# Patient Record
Sex: FEMALE | Race: WHITE | NOT HISPANIC OR LATINO | URBAN - METROPOLITAN AREA
[De-identification: names, ages, dates, MRNs, and addresses within clinical notes are randomized per-mention and may not be internally consistent; named-entity substitution may affect disease eponyms.]

---

## 2022-06-13 PROBLEM — Z00.00 ENCOUNTER FOR PREVENTIVE HEALTH EXAMINATION: Status: ACTIVE | Noted: 2022-06-13

## 2022-06-21 ENCOUNTER — OUTPATIENT (OUTPATIENT)
Dept: OUTPATIENT SERVICES | Facility: HOSPITAL | Age: 87
LOS: 1 days | Discharge: HOME | End: 2022-06-21
Payer: MEDICARE

## 2022-06-21 VITALS
TEMPERATURE: 99 F | RESPIRATION RATE: 18 BRPM | OXYGEN SATURATION: 93 % | SYSTOLIC BLOOD PRESSURE: 143 MMHG | DIASTOLIC BLOOD PRESSURE: 54 MMHG | WEIGHT: 189.6 LBS | HEIGHT: 61 IN | HEART RATE: 55 BPM

## 2022-06-21 DIAGNOSIS — Z98.891 HISTORY OF UTERINE SCAR FROM PREVIOUS SURGERY: Chronic | ICD-10-CM

## 2022-06-21 DIAGNOSIS — I10 ESSENTIAL (PRIMARY) HYPERTENSION: ICD-10-CM

## 2022-06-21 DIAGNOSIS — G47.30 SLEEP APNEA, UNSPECIFIED: ICD-10-CM

## 2022-06-21 DIAGNOSIS — Z98.890 OTHER SPECIFIED POSTPROCEDURAL STATES: Chronic | ICD-10-CM

## 2022-06-21 DIAGNOSIS — M17.11 UNILATERAL PRIMARY OSTEOARTHRITIS, RIGHT KNEE: ICD-10-CM

## 2022-06-21 DIAGNOSIS — E07.9 DISORDER OF THYROID, UNSPECIFIED: ICD-10-CM

## 2022-06-21 DIAGNOSIS — Z90.710 ACQUIRED ABSENCE OF BOTH CERVIX AND UTERUS: Chronic | ICD-10-CM

## 2022-06-21 DIAGNOSIS — Z87.19 PERSONAL HISTORY OF OTHER DISEASES OF THE DIGESTIVE SYSTEM: Chronic | ICD-10-CM

## 2022-06-21 DIAGNOSIS — Z01.818 ENCOUNTER FOR OTHER PREPROCEDURAL EXAMINATION: ICD-10-CM

## 2022-06-21 LAB
A1C WITH ESTIMATED AVERAGE GLUCOSE RESULT: 6.2 % — HIGH (ref 4–5.6)
ALBUMIN SERPL ELPH-MCNC: 3.3 G/DL — SIGNIFICANT CHANGE UP (ref 3.3–5)
ALP SERPL-CCNC: 60 U/L — SIGNIFICANT CHANGE UP (ref 40–120)
ALT FLD-CCNC: 21 U/L — SIGNIFICANT CHANGE UP (ref 12–78)
ANION GAP SERPL CALC-SCNC: 7 MMOL/L — SIGNIFICANT CHANGE UP (ref 5–17)
APTT BLD: 26.5 SEC — LOW (ref 27.5–35.5)
AST SERPL-CCNC: 22 U/L — SIGNIFICANT CHANGE UP (ref 15–37)
BASOPHILS # BLD AUTO: 0.05 K/UL — SIGNIFICANT CHANGE UP (ref 0–0.2)
BASOPHILS NFR BLD AUTO: 0.6 % — SIGNIFICANT CHANGE UP (ref 0–2)
BILIRUB SERPL-MCNC: 0.3 MG/DL — SIGNIFICANT CHANGE UP (ref 0.2–1.2)
BLD GP AB SCN SERPL QL: SIGNIFICANT CHANGE UP
BUN SERPL-MCNC: 31 MG/DL — HIGH (ref 7–23)
CALCIUM SERPL-MCNC: 9.3 MG/DL — SIGNIFICANT CHANGE UP (ref 8.5–10.1)
CHLORIDE SERPL-SCNC: 104 MMOL/L — SIGNIFICANT CHANGE UP (ref 96–108)
CO2 SERPL-SCNC: 29 MMOL/L — SIGNIFICANT CHANGE UP (ref 22–31)
CREAT SERPL-MCNC: 1.17 MG/DL — SIGNIFICANT CHANGE UP (ref 0.5–1.3)
EGFR: 45 ML/MIN/1.73M2 — LOW
EOSINOPHIL # BLD AUTO: 0.15 K/UL — SIGNIFICANT CHANGE UP (ref 0–0.5)
EOSINOPHIL NFR BLD AUTO: 1.8 % — SIGNIFICANT CHANGE UP (ref 0–6)
ESTIMATED AVERAGE GLUCOSE: 131 MG/DL — HIGH (ref 68–114)
GLUCOSE SERPL-MCNC: 97 MG/DL — SIGNIFICANT CHANGE UP (ref 70–99)
HCT VFR BLD CALC: 40.9 % — SIGNIFICANT CHANGE UP (ref 34.5–45)
HGB BLD-MCNC: 13.6 G/DL — SIGNIFICANT CHANGE UP (ref 11.5–15.5)
IMM GRANULOCYTES NFR BLD AUTO: 0.5 % — SIGNIFICANT CHANGE UP (ref 0–1.5)
INR BLD: 0.93 RATIO — SIGNIFICANT CHANGE UP (ref 0.88–1.16)
LYMPHOCYTES # BLD AUTO: 1.38 K/UL — SIGNIFICANT CHANGE UP (ref 1–3.3)
LYMPHOCYTES # BLD AUTO: 16.2 % — SIGNIFICANT CHANGE UP (ref 13–44)
MCHC RBC-ENTMCNC: 29.4 PG — SIGNIFICANT CHANGE UP (ref 27–34)
MCHC RBC-ENTMCNC: 33.3 G/DL — SIGNIFICANT CHANGE UP (ref 32–36)
MCV RBC AUTO: 88.3 FL — SIGNIFICANT CHANGE UP (ref 80–100)
MONOCYTES # BLD AUTO: 0.71 K/UL — SIGNIFICANT CHANGE UP (ref 0–0.9)
MONOCYTES NFR BLD AUTO: 8.3 % — SIGNIFICANT CHANGE UP (ref 2–14)
MRSA PCR RESULT.: SIGNIFICANT CHANGE UP
NEUTROPHILS # BLD AUTO: 6.2 K/UL — SIGNIFICANT CHANGE UP (ref 1.8–7.4)
NEUTROPHILS NFR BLD AUTO: 72.6 % — SIGNIFICANT CHANGE UP (ref 43–77)
NRBC # BLD: 0 /100 WBCS — SIGNIFICANT CHANGE UP (ref 0–0)
PLATELET # BLD AUTO: 195 K/UL — SIGNIFICANT CHANGE UP (ref 150–400)
POTASSIUM SERPL-MCNC: 3.3 MMOL/L — LOW (ref 3.5–5.3)
POTASSIUM SERPL-SCNC: 3.3 MMOL/L — LOW (ref 3.5–5.3)
PROT SERPL-MCNC: 6.7 GM/DL — SIGNIFICANT CHANGE UP (ref 6–8.3)
PROTHROM AB SERPL-ACNC: 11.2 SEC — SIGNIFICANT CHANGE UP (ref 10.5–13.4)
RBC # BLD: 4.63 M/UL — SIGNIFICANT CHANGE UP (ref 3.8–5.2)
RBC # FLD: 13.6 % — SIGNIFICANT CHANGE UP (ref 10.3–14.5)
S AUREUS DNA NOSE QL NAA+PROBE: SIGNIFICANT CHANGE UP
SODIUM SERPL-SCNC: 140 MMOL/L — SIGNIFICANT CHANGE UP (ref 135–145)
VIT D25+D1,25 OH+D1,25 PNL SERPL-MCNC: 30.3 PG/ML — SIGNIFICANT CHANGE UP (ref 19.9–79.3)
WBC # BLD: 8.53 K/UL — SIGNIFICANT CHANGE UP (ref 3.8–10.5)
WBC # FLD AUTO: 8.53 K/UL — SIGNIFICANT CHANGE UP (ref 3.8–10.5)

## 2022-06-21 PROCEDURE — 93010 ELECTROCARDIOGRAM REPORT: CPT

## 2022-06-21 RX ORDER — CELECOXIB 200 MG/1
200 CAPSULE ORAL ONCE
Refills: 0 | Status: DISCONTINUED | OUTPATIENT
Start: 2022-06-21 | End: 2022-07-05

## 2022-06-21 RX ORDER — ACETAMINOPHEN 500 MG
650 TABLET ORAL ONCE
Refills: 0 | Status: DISCONTINUED | OUTPATIENT
Start: 2022-06-21 | End: 2022-07-05

## 2022-06-21 NOTE — H&P PST ADULT - NSICDXPASTMEDICALHX_GEN_ALL_CORE_FT
PAST MEDICAL HISTORY:  History of hepatitis B     HLD (hyperlipidemia)     HTN (hypertension)     Prediabetes     Sheehans syndrome     Thyroid disease

## 2022-06-21 NOTE — OCCUPATIONAL THERAPY INITIAL EVALUATION ADULT - GENERAL OBSERVATIONS, REHAB EVAL
Chart reviewed. Patient encountered seated in chair in rehab preop room in [facility]. Patient underwent occupational therapy pre-operative consultation to determine current functional ADL limitations in order to provide the right equipment for patient to perform functional ADL post operation.

## 2022-06-21 NOTE — OCCUPATIONAL THERAPY INITIAL EVALUATION ADULT - PERTINENT HX OF CURRENT PROBLEM, REHAB EVAL
Pt is an 86 y/o female slated for elective surgery for right TKR with MD Jones on 7/6/22 due to OA, pain and DJD. Pt  reported buckling in her right knee, but denied any falls in the past 3-6 months

## 2022-06-21 NOTE — OCCUPATIONAL THERAPY INITIAL EVALUATION ADULT - PRECAUTIONS/LIMITATIONS, REHAB EVAL
Pt's mobility and ADL management is limited due to pain in right knee.  Pt has a history of multiple comorbidities and diminished reaction time due to age related changes./fall precautions

## 2022-06-21 NOTE — H&P PST ADULT - PROBLEM SELECTOR PLAN 1
Right total knee arthroplasty  labs - cbc,pt/ptt,bmp,t&s,nose cx,ekg  M/C required  endocrinology clearance  preop 3 day hibiclens instruction reviewed and given .instructed on if  nose cx positive use mupuricin 5 days and checklist given  take routine meds DOS with sips of water. avoid NSAID and OTC supplements. verbalized understanding  information on proper nutrition , increase protein and better food choices provided in packet   ensure clear held 2/2 borderline DM  Anesthesiologist to review PST labs, EKG, required clearances and optimization for surgery.

## 2022-06-21 NOTE — PHYSICAL THERAPY INITIAL EVALUATION ADULT - PERTINENT HX OF CURRENT PROBLEM, REHAB EVAL
Patient attends pre-op testing today following consult c Dr. Jones due to chronic pain to right knee. Elective R TKA is now scheduled in this facility for 7/6/2022.

## 2022-06-21 NOTE — OCCUPATIONAL THERAPY INITIAL EVALUATION ADULT - SOCIAL CONCERNS
Pt voiced concerns about  her recovery at home.  Pt endorsed that her daughters will be able to assist her after discharge home post-operatively./Complex psychosocial needs/coping issues

## 2022-06-21 NOTE — OCCUPATIONAL THERAPY INITIAL EVALUATION ADULT - LIVES WITH, PROFILE
Pt lives alone in a senior coop on the first floor with 1 step equipped with right ascending handrail to enter. All living amenities are located on one level. The bathroom has a walk in shower, grabs bars , fixed / retractable shower head and comfort height  toilet / grab bar in the front.

## 2022-06-21 NOTE — OCCUPATIONAL THERAPY INITIAL EVALUATION ADULT - ADDITIONAL COMMENTS
Pt is functioning in her roles, self sufficient, driving & ambulating independently in the community without  single axis cane. Pt stated " I go to Pilate classes twice weekly and weight training once per week". Pt c/o 2/10 pain in her right knee at rest and 9/101 at worse. The pain is exacerbated, by walking, prolonged standing, negotiating steps and is relieved with rest. Pt reported that she is allergic to Codeine. Pt is right hand dominant and wears glasses for reading. Pt scores 80% of patient specific scale .

## 2022-06-21 NOTE — H&P PST ADULT - HISTORY OF PRESENT ILLNESS
87F pmh htn, sleep apnea (NOT using CPAP), hld, prediabetes, hep B c/o right knee pain 2/2 unilateral primary osteoarthritis here for PST for scheduled Right total knee arthroplasty  This patient reports being infected with COVID 4-2022 symptoms described as fever and chills she has since fully recovered and had negative COCID swab.  Patient currently denies any fever, cough, sob, flu like symptoms or travel outside of the [place] in the past 30 days   This patient has NOT been vaccinated for COVID

## 2022-06-21 NOTE — PHYSICAL THERAPY INITIAL EVALUATION ADULT - ADDITIONAL COMMENTS
Pt lives alone (daughter can come over and provide assist upon D/C home) in a COOP, 1 entry step c R  rail up, 1 level inside home.  Pt has a walk-in shower stall with a retractable shower head, comfort toilet seat height, & + grab bars(shower and toilet). Pt states she is currently independent with all functional mobility including community ambulation with walking poles prn. Pt also owns rolling walker(all in good working condition & easily accessible). Pt states she is independent with ADL's as well. Pt reports daily 2/10 pain at rest & states it is worse with any activity 9/10. Pt is right hand dominant, wears eye glasses, drives, & is retired. Pt denies taking narcotics for pain management. Goal of therapy: manage pain & improve functional mobility.

## 2022-06-21 NOTE — H&P PST ADULT - NSICDXPASTSURGICALHX_GEN_ALL_CORE_FT
PAST SURGICAL HISTORY:  H/O intestinal obstruction     H/O:  x4    H/O: hysterectomy     History of excision of pilonidal cyst

## 2022-06-21 NOTE — H&P PST ADULT - ASSESSMENT
87F pmh htn, sleep apnea (NOT using CPAP), hld, prediabetes, hep B c/o right knee pain 2/2 unilateral primary osteoarthritis here for PST for scheduled Right total knee arthroplasty  CAPRINI SCORE    AGE RELATED RISK FACTORS                                                       MOBILITY RELATED FACTORS  [ ] Age 41-60 years                                            (1 Point)                  [ ] Bed rest                                                        (1 Point)  [ ] Age: 61-74 years                                           (2 Points)                [ ] Plaster cast                                                   (2 Points)  [x ] Age= 75 years                                              (3 Points)                 [ ] Bed bound for more than 72 hours                   (2 Points)    DISEASE RELATED RISK FACTORS                                               GENDER SPECIFIC FACTORS  [x ] Edema in the lower extremities                       (1 Point)                  [ ] Pregnancy                                                     (1 Point)  [ ] Varicose veins                                               (1 Point)                  [ ] Post-partum < 6 weeks                                   (1 Point)             [x ] BMI > 25 Kg/m2                                            (1 Point)                  [ ] Hormonal therapy  or oral contraception            (1 Point)                 [ ] Sepsis (in the previous month)                        (1 Point)                  [ ] History of pregnancy complications  [ ] Pneumonia or serious lung disease                                               [ ] Unexplained or recurrent                       (1 Point)           (in the previous month)                               (1 Point)  [ ] Abnormal pulmonary function test                     (1 Point)                 SURGERY RELATED RISK FACTORS  [ ] Acute myocardial infarction                              (1 Point)                 [ ]  Section                                            (1 Point)  [ ] Congestive heart failure (in the previous month)  (1 Point)                 [ ] Minor surgery                                                 (1 Point)   [ ] Inflammatory bowel disease                             (1 Point)                 [ ] Arthroscopic surgery                                        (2 Points)  [ ] Central venous access                                    (2 Points)                [ ] General surgery lasting more than 45 minutes   (2 Points)       [ ] Stroke (in the previous month)                          (5 Points)               [x ] Elective arthroplasty                                        (5 Points)                                                                                                                                               HEMATOLOGY RELATED FACTORS                                                 TRAUMA RELATED RISK FACTORS  [ ] Prior episodes of VTE                                     (3 Points)                 [ ] Fracture of the hip, pelvis, or leg                       (5 Points)  [ ] Positive family history for VTE                         (3 Points)                 [ ] Acute spinal cord injury (in the previous month)  (5 Points)  [ ] Prothrombin 85509 A                                      (3 Points)                 [ ] Paralysis  (less than 1 month)                          (5 Points)  [ ] Factor V Leiden                                             (3 Points)                 [ ] Multiple Trauma within 1 month                         (5 Points)  [ ] Lupus anticoagulants                                     (3 Points)                                                           [ ] Anticardiolipin antibodies                                (3 Points)                                                       [ ] High homocysteine in the blood                      (3 Points)                                             [ ] Other congenital or acquired thrombophilia       (3 Points)                                                [ ] Heparin induced thrombocytopenia                  (3 Points)                                          Total Score [     10     ]

## 2022-06-21 NOTE — PHYSICAL THERAPY INITIAL EVALUATION ADULT - ASR EQUIP NEEDS DISCH PT EVAL
Patient has own rolling walker. Pt declined 3:1 commode stating has comfort height toilet seat and grab bar in toilet.

## 2022-06-21 NOTE — OCCUPATIONAL THERAPY INITIAL EVALUATION ADULT - ANTICIPATED DISCHARGE DISPOSITION, OT EVAL
Recommend home pending post op assessment with OT referral to enable patient to safely perform ADL management and functional mobility. Pt has a SAC, rolling walker. Pt's toilet is a comfort height with grab bar in front  of it and this is adequate for pt's statue (5 ft 1 inch).

## 2022-07-05 ENCOUNTER — FORM ENCOUNTER (OUTPATIENT)
Age: 87
End: 2022-07-05

## 2022-07-06 ENCOUNTER — APPOINTMENT (OUTPATIENT)
Dept: ORTHOPEDIC SURGERY | Facility: HOSPITAL | Age: 87
End: 2022-07-06

## 2022-07-06 ENCOUNTER — OUTPATIENT (OUTPATIENT)
Dept: OUTPATIENT SERVICES | Facility: HOSPITAL | Age: 87
LOS: 1 days | Discharge: HOME | End: 2022-07-06

## 2022-07-06 ENCOUNTER — TRANSCRIPTION ENCOUNTER (OUTPATIENT)
Age: 87
End: 2022-07-06

## 2022-07-06 DIAGNOSIS — Z90.710 ACQUIRED ABSENCE OF BOTH CERVIX AND UTERUS: Chronic | ICD-10-CM

## 2022-07-06 DIAGNOSIS — Z87.19 PERSONAL HISTORY OF OTHER DISEASES OF THE DIGESTIVE SYSTEM: Chronic | ICD-10-CM

## 2022-07-06 DIAGNOSIS — Z98.890 OTHER SPECIFIED POSTPROCEDURAL STATES: Chronic | ICD-10-CM

## 2022-07-06 DIAGNOSIS — Z98.891 HISTORY OF UTERINE SCAR FROM PREVIOUS SURGERY: Chronic | ICD-10-CM

## 2022-07-06 PROCEDURE — 27447 TOTAL KNEE ARTHROPLASTY: CPT | Mod: RT

## 2022-07-06 PROCEDURE — 27447 TOTAL KNEE ARTHROPLASTY: CPT | Mod: AS,RT

## 2022-07-06 PROCEDURE — 20985 CPTR-ASST DIR MS PX: CPT

## 2022-07-08 RX ORDER — POTASSIUM CHLORIDE 20 MEQ
1 PACKET (EA) ORAL
Qty: 0 | Refills: 0 | DISCHARGE
Start: 2022-07-08

## 2022-07-12 PROBLEM — E23.0 HYPOPITUITARISM: Chronic | Status: ACTIVE | Noted: 2022-06-21

## 2022-07-12 PROBLEM — Z86.19 PERSONAL HISTORY OF OTHER INFECTIOUS AND PARASITIC DISEASES: Chronic | Status: ACTIVE | Noted: 2022-06-21

## 2022-07-12 PROBLEM — I10 ESSENTIAL (PRIMARY) HYPERTENSION: Chronic | Status: ACTIVE | Noted: 2022-06-21

## 2022-07-12 PROBLEM — E78.5 HYPERLIPIDEMIA, UNSPECIFIED: Chronic | Status: ACTIVE | Noted: 2022-06-21

## 2022-07-12 PROBLEM — R73.03 PREDIABETES: Chronic | Status: ACTIVE | Noted: 2022-06-21

## 2022-07-12 PROBLEM — E07.9 DISORDER OF THYROID, UNSPECIFIED: Chronic | Status: ACTIVE | Noted: 2022-06-21

## 2022-07-13 DIAGNOSIS — Z87.891 PERSONAL HISTORY OF NICOTINE DEPENDENCE: ICD-10-CM

## 2022-07-13 DIAGNOSIS — E23.0 HYPOPITUITARISM: ICD-10-CM

## 2022-07-13 DIAGNOSIS — G47.33 OBSTRUCTIVE SLEEP APNEA (ADULT) (PEDIATRIC): ICD-10-CM

## 2022-07-13 DIAGNOSIS — Z88.5 ALLERGY STATUS TO NARCOTIC AGENT: ICD-10-CM

## 2022-07-13 DIAGNOSIS — M17.0 BILATERAL PRIMARY OSTEOARTHRITIS OF KNEE: ICD-10-CM

## 2022-07-13 DIAGNOSIS — I10 ESSENTIAL (PRIMARY) HYPERTENSION: ICD-10-CM

## 2022-07-13 DIAGNOSIS — E78.5 HYPERLIPIDEMIA, UNSPECIFIED: ICD-10-CM

## 2022-07-13 DIAGNOSIS — Z88.1 ALLERGY STATUS TO OTHER ANTIBIOTIC AGENTS STATUS: ICD-10-CM

## 2022-07-13 DIAGNOSIS — M17.11 UNILATERAL PRIMARY OSTEOARTHRITIS, RIGHT KNEE: ICD-10-CM

## 2022-07-13 RX ORDER — ONDANSETRON 4 MG/1
4 TABLET ORAL EVERY 6 HOURS
Qty: 28 | Refills: 0 | Status: ACTIVE | COMMUNITY
Start: 2022-07-13 | End: 1900-01-01

## 2022-07-18 ENCOUNTER — RESULT REVIEW (OUTPATIENT)
Age: 87
End: 2022-07-18

## 2022-07-19 ENCOUNTER — INPATIENT (INPATIENT)
Facility: HOSPITAL | Age: 87
LOS: 2 days | Discharge: HOME HEALTH | End: 2022-07-22
Attending: INTERNAL MEDICINE | Admitting: INTERNAL MEDICINE

## 2022-07-19 VITALS
WEIGHT: 190.04 LBS | RESPIRATION RATE: 18 BRPM | DIASTOLIC BLOOD PRESSURE: 74 MMHG | HEIGHT: 61 IN | SYSTOLIC BLOOD PRESSURE: 147 MMHG | TEMPERATURE: 98 F | OXYGEN SATURATION: 97 % | HEART RATE: 63 BPM

## 2022-07-19 DIAGNOSIS — Z87.19 PERSONAL HISTORY OF OTHER DISEASES OF THE DIGESTIVE SYSTEM: Chronic | ICD-10-CM

## 2022-07-19 DIAGNOSIS — Z98.890 OTHER SPECIFIED POSTPROCEDURAL STATES: Chronic | ICD-10-CM

## 2022-07-19 DIAGNOSIS — Z98.49 CATARACT EXTRACTION STATUS, UNSPECIFIED EYE: Chronic | ICD-10-CM

## 2022-07-19 DIAGNOSIS — Z96.651 PRESENCE OF RIGHT ARTIFICIAL KNEE JOINT: Chronic | ICD-10-CM

## 2022-07-19 DIAGNOSIS — Z90.710 ACQUIRED ABSENCE OF BOTH CERVIX AND UTERUS: Chronic | ICD-10-CM

## 2022-07-19 DIAGNOSIS — Z98.891 HISTORY OF UTERINE SCAR FROM PREVIOUS SURGERY: Chronic | ICD-10-CM

## 2022-07-19 LAB
ALBUMIN SERPL ELPH-MCNC: 3.2 G/DL — LOW (ref 3.3–5)
ALP SERPL-CCNC: 71 U/L — SIGNIFICANT CHANGE UP (ref 40–120)
ALT FLD-CCNC: 44 U/L — SIGNIFICANT CHANGE UP (ref 12–78)
ANION GAP SERPL CALC-SCNC: 8 MMOL/L — SIGNIFICANT CHANGE UP (ref 5–17)
AST SERPL-CCNC: 32 U/L — SIGNIFICANT CHANGE UP (ref 15–37)
BASOPHILS # BLD AUTO: 0.06 K/UL — SIGNIFICANT CHANGE UP (ref 0–0.2)
BASOPHILS NFR BLD AUTO: 0.5 % — SIGNIFICANT CHANGE UP (ref 0–2)
BILIRUB SERPL-MCNC: 0.3 MG/DL — SIGNIFICANT CHANGE UP (ref 0.2–1.2)
BUN SERPL-MCNC: 23 MG/DL — SIGNIFICANT CHANGE UP (ref 7–23)
CALCIUM SERPL-MCNC: 9.7 MG/DL — SIGNIFICANT CHANGE UP (ref 8.5–10.1)
CHLORIDE SERPL-SCNC: 96 MMOL/L — SIGNIFICANT CHANGE UP (ref 96–108)
CO2 SERPL-SCNC: 30 MMOL/L — SIGNIFICANT CHANGE UP (ref 22–31)
CREAT SERPL-MCNC: 1.18 MG/DL — SIGNIFICANT CHANGE UP (ref 0.5–1.3)
EGFR: 45 ML/MIN/1.73M2 — LOW
EOSINOPHIL # BLD AUTO: 0.23 K/UL — SIGNIFICANT CHANGE UP (ref 0–0.5)
EOSINOPHIL NFR BLD AUTO: 1.9 % — SIGNIFICANT CHANGE UP (ref 0–6)
FLUAV AG NPH QL: SIGNIFICANT CHANGE UP
FLUBV AG NPH QL: SIGNIFICANT CHANGE UP
GLUCOSE SERPL-MCNC: 103 MG/DL — HIGH (ref 70–99)
HCT VFR BLD CALC: 32.9 % — LOW (ref 34.5–45)
HGB BLD-MCNC: 11.1 G/DL — LOW (ref 11.5–15.5)
IMM GRANULOCYTES NFR BLD AUTO: 1.4 % — SIGNIFICANT CHANGE UP (ref 0–1.5)
LYMPHOCYTES # BLD AUTO: 1.89 K/UL — SIGNIFICANT CHANGE UP (ref 1–3.3)
LYMPHOCYTES # BLD AUTO: 15.9 % — SIGNIFICANT CHANGE UP (ref 13–44)
MAGNESIUM SERPL-MCNC: 2 MG/DL — SIGNIFICANT CHANGE UP (ref 1.6–2.6)
MCHC RBC-ENTMCNC: 29.8 PG — SIGNIFICANT CHANGE UP (ref 27–34)
MCHC RBC-ENTMCNC: 33.7 G/DL — SIGNIFICANT CHANGE UP (ref 32–36)
MCV RBC AUTO: 88.2 FL — SIGNIFICANT CHANGE UP (ref 80–100)
MONOCYTES # BLD AUTO: 0.9 K/UL — SIGNIFICANT CHANGE UP (ref 0–0.9)
MONOCYTES NFR BLD AUTO: 7.6 % — SIGNIFICANT CHANGE UP (ref 2–14)
NEUTROPHILS # BLD AUTO: 8.6 K/UL — HIGH (ref 1.8–7.4)
NEUTROPHILS NFR BLD AUTO: 72.7 % — SIGNIFICANT CHANGE UP (ref 43–77)
NRBC # BLD: 0 /100 WBCS — SIGNIFICANT CHANGE UP (ref 0–0)
PLATELET # BLD AUTO: 244 K/UL — SIGNIFICANT CHANGE UP (ref 150–400)
POTASSIUM SERPL-MCNC: 4.3 MMOL/L — SIGNIFICANT CHANGE UP (ref 3.5–5.3)
POTASSIUM SERPL-SCNC: 4.3 MMOL/L — SIGNIFICANT CHANGE UP (ref 3.5–5.3)
PROT SERPL-MCNC: 6.7 GM/DL — SIGNIFICANT CHANGE UP (ref 6–8.3)
RBC # BLD: 3.73 M/UL — LOW (ref 3.8–5.2)
RBC # FLD: 14.3 % — SIGNIFICANT CHANGE UP (ref 10.3–14.5)
SARS-COV-2 RNA SPEC QL NAA+PROBE: SIGNIFICANT CHANGE UP
SODIUM SERPL-SCNC: 134 MMOL/L — LOW (ref 135–145)
WBC # BLD: 11.85 K/UL — HIGH (ref 3.8–10.5)
WBC # FLD AUTO: 11.85 K/UL — HIGH (ref 3.8–10.5)

## 2022-07-19 PROCEDURE — 99284 EMERGENCY DEPT VISIT MOD MDM: CPT

## 2022-07-19 PROCEDURE — 73562 X-RAY EXAM OF KNEE 3: CPT | Mod: 26,RT

## 2022-07-19 PROCEDURE — 99222 1ST HOSP IP/OBS MODERATE 55: CPT

## 2022-07-19 RX ORDER — VANCOMYCIN HCL 1 G
1000 VIAL (EA) INTRAVENOUS ONCE
Refills: 0 | Status: COMPLETED | OUTPATIENT
Start: 2022-07-19 | End: 2022-07-19

## 2022-07-19 RX ORDER — EVOLOCUMAB 140 MG/ML
0 INJECTION, SOLUTION SUBCUTANEOUS
Qty: 0 | Refills: 0 | DISCHARGE

## 2022-07-19 RX ORDER — LEVOTHYROXINE SODIUM 125 MCG
100 TABLET ORAL DAILY
Refills: 0 | Status: DISCONTINUED | OUTPATIENT
Start: 2022-07-19 | End: 2022-07-22

## 2022-07-19 RX ORDER — LOSARTAN POTASSIUM 100 MG/1
50 TABLET, FILM COATED ORAL DAILY
Refills: 0 | Status: DISCONTINUED | OUTPATIENT
Start: 2022-07-19 | End: 2022-07-21

## 2022-07-19 RX ORDER — DIPHENHYDRAMINE HCL 50 MG
25 CAPSULE ORAL ONCE
Refills: 0 | Status: COMPLETED | OUTPATIENT
Start: 2022-07-19 | End: 2022-07-19

## 2022-07-19 RX ADMIN — Medication 250 MILLIGRAM(S): at 20:05

## 2022-07-19 RX ADMIN — Medication 25 MILLIGRAM(S): at 20:15

## 2022-07-19 NOTE — H&P ADULT - NSHPLABSRESULTS_GEN_ALL_CORE
Recent Vitals  T(C): 36.8 (07-19-22 @ 18:52), Max: 36.8 (07-19-22 @ 18:52)  HR: 63 (07-19-22 @ 18:52) (63 - 63)  BP: 147/74 (07-19-22 @ 18:52) (147/74 - 147/74)  RR: 18 (07-19-22 @ 18:52) (18 - 18)  SpO2: 97% (07-19-22 @ 18:52) (97% - 97%)                        11.1   11.85 )-----------( 244      ( 19 Jul 2022 20:00 )             32.9     07-19    134<L>  |  96  |  23  ----------------------------<  103<H>  4.3   |  30  |  1.18    Ca    9.7      19 Jul 2022 20:00  Mg     2.0     07-19    TPro  6.7  /  Alb  3.2<L>  /  TBili  0.3  /  DBili  x   /  AST  32  /  ALT  44  /  AlkPhos  71  07-19      LIVER FUNCTIONS - ( 19 Jul 2022 20:00 )  Alb: 3.2 g/dL / Pro: 6.7 gm/dL / ALK PHOS: 71 U/L / ALT: 44 U/L / AST: 32 U/L / GGT: x               Home Medications:  acetaminophen 325 mg oral tablet: 3 tab(s) orally every 8 hours (19 Jul 2022 22:35)  ascorbic acid 500 mg oral tablet: 1 tab(s) orally 2 times a day (19 Jul 2022 22:35)  hydrocortisone 10 mg oral tablet: 1 tab(s) orally once a day in AM (19 Jul 2022 22:35)  hydrocortisone 5 mg oral tablet: 1 tab(s) orally once a day in PM (19 Jul 2022 22:35)  levothyroxine 100 mcg (0.1 mg) oral tablet: 1 tab(s) orally once a day (19 Jul 2022 22:35)  losartan-hydrochlorothiazide 50 mg-12.5 mg oral tablet: 1 tab(s) orally once a day (19 Jul 2022 22:35)  metOLazone 5 mg oral tablet: 5 milligram(s) orally every other day (19 Jul 2022 22:35)  Multiple Vitamins oral tablet: 1 tab(s) orally once a day (19 Jul 2022 22:35)  polyethylene glycol 3350 oral powder for reconstitution: 17 gram(s) orally once a day (at bedtime) (19 Jul 2022 22:35)  potassium chloride 20 mEq oral tablet, extended release: 1 tab(s) orally once a day (19 Jul 2022 22:35)  Repatha 140 mg/mL subcutaneous solution: subcutaneous 2 times a month (19 Jul 2022 22:35)  senna leaf extract oral tablet: 2 tab(s) orally once a day (at bedtime) (19 Jul 2022 22:35)

## 2022-07-19 NOTE — ED ADULT NURSE REASSESSMENT NOTE - NS ED NURSE REASSESS COMMENT FT1
received report form ERICH Okeefe. pt on the bed alert and oriented pt identified self introduced. pt for blood works and antibiotics. safety measures checked.

## 2022-07-19 NOTE — ED PROVIDER NOTE - PHYSICAL EXAMINATION
Gen: Alert, NAD  Head: NC, AT   Eyes: PERRL, EOMI, normal lids/conjunctiva  ENT: normal hearing, patent oropharynx without erythema/exudate, uvula midline  Neck: supple, no tenderness, Trachea midline  Pulm: Bilateral BS, normal resp effort, no wheeze/stridor/retractions  CV: RRR, no M/R/G, 2+ radial and dp pulses bl, no edema  Abd: soft, NT/ND, +BS, no hepatosplenomegaly  Mskel: right knee effusion with intact midline suture, steris in place. no erythema   Skin: right leg cellulitis   Neuro: AAOx3, no sensory/motor deficits, CN 2-12 intact

## 2022-07-19 NOTE — ED PROVIDER NOTE - CLINICAL SUMMARY MEDICAL DECISION MAKING FREE TEXT BOX
right leg cellulitis. abx. consider admit as pt has previously had resistant cellulitis and was recently instrumented.

## 2022-07-19 NOTE — H&P ADULT - NSHPREVIEWOFSYSTEMS_GEN_ALL_CORE
Constitutional: no fever, chills, night sweats  Ears: no hearing changes or ear pain,   Nose: no nasal congestion, sinus pain, or rhinorrhea  Cardio: no chest pain, orthopnea, edema, or palpitations  Resp: no dyspnea, cough, wheezing  GI: no nausea, vomiting, diarrhea, constipation, hematochezia, or melena  : no dysuria, urinary frequency, hematuria  MSK: no back pain, neck pain  Skin: erythema and pruritis to RLE    Neuro: no weakness, dizziness, lightheadedness, syncope   Heme/Lymph: no bruising or bleeding

## 2022-07-19 NOTE — H&P ADULT - PROBLEM SELECTOR PLAN 1
Ortho consult appreciated  Continue IV vancomycin.  ID eval in am  ESR/CRP pending   Tylenol 650 mg PRN pain/fever

## 2022-07-19 NOTE — H&P ADULT - NSHPSOCIALHISTORY_GEN_ALL_CORE
Patient lives alone at home, exercises 3x weekly.  , retired nurse.  Admits to remote occasional smoking history. Drinks wine occasionally with dinner or holidays. Denies illicit drug use. Denies h/o STIs.

## 2022-07-19 NOTE — H&P ADULT - ATTENDING COMMENTS
Patient is an 87F with a PMH of HTN, ANGELES not on CPAP, preDM, HLD who presents to the ED for RLE pain.  Recently had a R TKA performed by Dr Jones (POD 13).  Presents for erythema and pruritis to area.  States she has a history of cellulitis requiring IV antibiotics.  Denies history of fever, chills, nausea, or vomiting.  Seen by Ortho team who agrees with IV abx.  Will admit to med surg.  DEJA monzon in am

## 2022-07-19 NOTE — ED ADULT NURSE REASSESSMENT NOTE - NS ED NURSE REASSESS COMMENT FT1
pt right leg elevated, surgical wound noted on the right knee, no drainage noted. pt sent to radiology for xray.

## 2022-07-19 NOTE — ED PROVIDER NOTE - OBJECTIVE STATEMENT
87yFemale with history of Right knee OA presenting for R TKA by Dr. Jones on 7/6/22 pw pain rle. pt noted yesterday erythema and itching of the rle. no fever, chills, n, v. had sono done at  that was neg for dvt. ros neg for ha, vision loss, rhinorrhea, cp, sob, abd pain, bleeding, dysuria, anxiety. has been ambulatory on the right knee with tramadol

## 2022-07-19 NOTE — ED ADULT NURSE NOTE - OBJECTIVE STATEMENT
pt A&Ox4 with PMH HTN, Hypercholesterolemia, present today with right calf itchiness and swelling x 1 day , states she has a hx of cellulitis in the past.  pt s/p knee surgery, strips are CDI.

## 2022-07-19 NOTE — H&P ADULT - NSICDXPASTSURGICALHX_GEN_ALL_CORE_FT
PAST SURGICAL HISTORY:  H/O intestinal obstruction     H/O:  x4    H/O: hysterectomy     History of cataract surgery B/l eyes    History of excision of pilonidal cyst     S/P total knee replacement, right 2022

## 2022-07-19 NOTE — H&P ADULT - ASSESSMENT
88 y/o F with PMHx OA, HTN, HLD, cataracts, and recent total R knee replacement surgery (admitted July 6, 2022) c/o R ankle redness and swelling x 2 days. She states the redness started in her ankle and has spread up her leg to her calf within the past day. She also endorses the area starting to become itchy and scaly within the past day.  Patient also reports cellulitis in the same area 5-6 years ago.  Reports having venous doppler done yesterday outpatient, negative for DVT.  Denies fever, chills, pain with motion in ankle, dizziness, SOB, chest pain, n/v/c/d/abd pain.  Admit to medical floor for cellulitis.    -Afebrile, WBC 11.85  -ESR/CRP pending  -IV abx. Consider ID consult  -Consider ortho consult  -Continue PT, WBAT RLE  -Leg elevation RLE while in bed  -Ice to knee q2 hrs  -Continue home meds  -ASA 81 BID for DVT ppx per ortho recs   86 y/o F with PMHx OA, HTN, HLD, cataracts, and recent total R knee replacement surgery (admitted July 6, 2022) c/o R ankle redness and swelling x 2 days. She states the redness started in her ankle and has spread up her leg to her calf within the past day. She also endorses the area starting to become itchy and scaly within the past day.  Patient also reports cellulitis in the same area 5-6 years ago.  Reports having venous doppler done yesterday outpatient, negative for DVT.  Denies fever, chills, pain with motion in ankle, dizziness, SOB, chest pain, n/v/c/d/abd pain.  Admit to medical floor for cellulitis.

## 2022-07-19 NOTE — H&P ADULT - HISTORY OF PRESENT ILLNESS
88 y/o F with PMHx OA, HTN, HLD, cataracts, and recent R TKA (July 6, 2022) c/o R ankle redness and swelling x 2 days. She states the redness started in her ankle and has spread up her leg to her calf within the past day. She also endorses the area starting to become itchy and scaly within the past day.  Patient also reports cellulitis in the same area 5-6 years ago.  Reports having venous doppler done yesterday outpatient, negative for DVT.  Denies fever, chills, pain with motion in ankle, dizziness, SOB, chest pain, n/v/c/d/abd pain.

## 2022-07-19 NOTE — ED ADULT NURSE NOTE - NSIMPLEMENTINTERV_GEN_ALL_ED
Implemented All Fall with Harm Risk Interventions:  [place] to call system. Call bell, personal items and telephone within reach. Instruct patient to call for assistance. Room bathroom lighting operational. Non-slip footwear when patient is off stretcher. Physically safe environment: no spills, clutter or unnecessary equipment. Stretcher in lowest position, wheels locked, appropriate side rails in place. Provide visual cue, wrist band, yellow gown, etc. Monitor gait and stability. Monitor for mental status changes and reorient to person, place, and time. Review medications for side effects contributing to fall risk. Reinforce activity limits and safety measures with patient and family. Provide visual clues: red socks.

## 2022-07-19 NOTE — H&P ADULT - NSHPPHYSICALEXAM_GEN_ALL_CORE
· Constitutional	Comfortable, well-groomed; no distress  · Eyes	PERRL; EOMI; conjunctiva clear  · Respiratory	clear to auscultation bilaterally; no wheezes; no rales  · Cardiovascular	regular rate and rhythm; S1 S2 present; pedal edema  · Pedal Edema Severity	2+  · Pedal Edema Type	pitting  · Gastrointestinal	Soft; nontender; nondistended; normal active bowel sounds  · Neurological	cranial nerves II-XII intact; sensation intact  · Skin	Erythematous blanching plaque right mid-shin to ankle, warm and mildly TTP. No weeping. Pinpoint fluid-filled papules noted.  · Lymphatic	No lymphadedenopathy  · Musculoskeletal	RLE: Prineo dressing on R knee C/D/I. FAROM, able to SLR   LLE: No erythema or edema.  FAROM b/l knee and ankle. No calf tenderness b/l.  · Psychiatric	Normal affect; alert and oriented x3; normal behavior

## 2022-07-20 DIAGNOSIS — L03.90 CELLULITIS, UNSPECIFIED: ICD-10-CM

## 2022-07-20 DIAGNOSIS — Z29.9 ENCOUNTER FOR PROPHYLACTIC MEASURES, UNSPECIFIED: ICD-10-CM

## 2022-07-20 DIAGNOSIS — E03.9 HYPOTHYROIDISM, UNSPECIFIED: ICD-10-CM

## 2022-07-20 DIAGNOSIS — I10 ESSENTIAL (PRIMARY) HYPERTENSION: ICD-10-CM

## 2022-07-20 LAB
ANION GAP SERPL CALC-SCNC: 8 MMOL/L — SIGNIFICANT CHANGE UP (ref 5–17)
BUN SERPL-MCNC: 21 MG/DL — SIGNIFICANT CHANGE UP (ref 7–23)
CALCIUM SERPL-MCNC: 9.6 MG/DL — SIGNIFICANT CHANGE UP (ref 8.5–10.1)
CHLORIDE SERPL-SCNC: 102 MMOL/L — SIGNIFICANT CHANGE UP (ref 96–108)
CO2 SERPL-SCNC: 28 MMOL/L — SIGNIFICANT CHANGE UP (ref 22–31)
CREAT SERPL-MCNC: 1.14 MG/DL — SIGNIFICANT CHANGE UP (ref 0.5–1.3)
CRP SERPL-MCNC: 24 MG/L — HIGH
EGFR: 47 ML/MIN/1.73M2 — LOW
ERYTHROCYTE [SEDIMENTATION RATE] IN BLOOD: 32 MM/HR — HIGH (ref 0–20)
GLUCOSE SERPL-MCNC: 90 MG/DL — SIGNIFICANT CHANGE UP (ref 70–99)
HCT VFR BLD CALC: 35.2 % — SIGNIFICANT CHANGE UP (ref 34.5–45)
HGB BLD-MCNC: 11.6 G/DL — SIGNIFICANT CHANGE UP (ref 11.5–15.5)
MCHC RBC-ENTMCNC: 29.1 PG — SIGNIFICANT CHANGE UP (ref 27–34)
MCHC RBC-ENTMCNC: 33 G/DL — SIGNIFICANT CHANGE UP (ref 32–36)
MCV RBC AUTO: 88.2 FL — SIGNIFICANT CHANGE UP (ref 80–100)
NRBC # BLD: 0 /100 WBCS — SIGNIFICANT CHANGE UP (ref 0–0)
PLATELET # BLD AUTO: 265 K/UL — SIGNIFICANT CHANGE UP (ref 150–400)
POTASSIUM SERPL-MCNC: 3.6 MMOL/L — SIGNIFICANT CHANGE UP (ref 3.5–5.3)
POTASSIUM SERPL-SCNC: 3.6 MMOL/L — SIGNIFICANT CHANGE UP (ref 3.5–5.3)
RBC # BLD: 3.99 M/UL — SIGNIFICANT CHANGE UP (ref 3.8–5.2)
RBC # FLD: 14.3 % — SIGNIFICANT CHANGE UP (ref 10.3–14.5)
SODIUM SERPL-SCNC: 138 MMOL/L — SIGNIFICANT CHANGE UP (ref 135–145)
WBC # BLD: 10.58 K/UL — HIGH (ref 3.8–10.5)
WBC # FLD AUTO: 10.58 K/UL — HIGH (ref 3.8–10.5)

## 2022-07-20 PROCEDURE — 99222 1ST HOSP IP/OBS MODERATE 55: CPT

## 2022-07-20 PROCEDURE — 99232 SBSQ HOSP IP/OBS MODERATE 35: CPT

## 2022-07-20 RX ORDER — TRAMADOL HYDROCHLORIDE 50 MG/1
25 TABLET ORAL ONCE
Refills: 0 | Status: DISCONTINUED | OUTPATIENT
Start: 2022-07-20 | End: 2022-07-20

## 2022-07-20 RX ORDER — HYDROCORTISONE 20 MG
5 TABLET ORAL AT BEDTIME
Refills: 0 | Status: DISCONTINUED | OUTPATIENT
Start: 2022-07-20 | End: 2022-07-22

## 2022-07-20 RX ORDER — CEFAZOLIN SODIUM 1 G
2000 VIAL (EA) INJECTION ONCE
Refills: 0 | Status: COMPLETED | OUTPATIENT
Start: 2022-07-20 | End: 2022-07-20

## 2022-07-20 RX ORDER — DIPHENHYDRAMINE HCL 50 MG
25 CAPSULE ORAL EVERY 6 HOURS
Refills: 0 | Status: DISCONTINUED | OUTPATIENT
Start: 2022-07-20 | End: 2022-07-22

## 2022-07-20 RX ORDER — POTASSIUM CHLORIDE 20 MEQ
20 PACKET (EA) ORAL ONCE
Refills: 0 | Status: DISCONTINUED | OUTPATIENT
Start: 2022-07-23 | End: 2022-07-22

## 2022-07-20 RX ORDER — OXYCODONE AND ACETAMINOPHEN 5; 325 MG/1; MG/1
2 TABLET ORAL EVERY 6 HOURS
Refills: 0 | Status: DISCONTINUED | OUTPATIENT
Start: 2022-07-20 | End: 2022-07-21

## 2022-07-20 RX ORDER — POTASSIUM CHLORIDE 20 MEQ
20 PACKET (EA) ORAL ONCE
Refills: 0 | Status: COMPLETED | OUTPATIENT
Start: 2022-07-21 | End: 2022-07-21

## 2022-07-20 RX ORDER — HYDROCORTISONE 20 MG
10 TABLET ORAL DAILY
Refills: 0 | Status: DISCONTINUED | OUTPATIENT
Start: 2022-07-20 | End: 2022-07-22

## 2022-07-20 RX ORDER — ASPIRIN/CALCIUM CARB/MAGNESIUM 324 MG
81 TABLET ORAL
Refills: 0 | Status: DISCONTINUED | OUTPATIENT
Start: 2022-07-20 | End: 2022-07-22

## 2022-07-20 RX ORDER — HEPARIN SODIUM 5000 [USP'U]/ML
5000 INJECTION INTRAVENOUS; SUBCUTANEOUS EVERY 12 HOURS
Refills: 0 | Status: DISCONTINUED | OUTPATIENT
Start: 2022-07-20 | End: 2022-07-20

## 2022-07-20 RX ORDER — ACETAMINOPHEN 500 MG
650 TABLET ORAL EVERY 6 HOURS
Refills: 0 | Status: DISCONTINUED | OUTPATIENT
Start: 2022-07-20 | End: 2022-07-20

## 2022-07-20 RX ORDER — CEFAZOLIN SODIUM 1 G
2000 VIAL (EA) INJECTION EVERY 8 HOURS
Refills: 0 | Status: DISCONTINUED | OUTPATIENT
Start: 2022-07-20 | End: 2022-07-20

## 2022-07-20 RX ORDER — OXYCODONE AND ACETAMINOPHEN 5; 325 MG/1; MG/1
1 TABLET ORAL EVERY 6 HOURS
Refills: 0 | Status: DISCONTINUED | OUTPATIENT
Start: 2022-07-20 | End: 2022-07-20

## 2022-07-20 RX ORDER — POTASSIUM CHLORIDE 20 MEQ
20 PACKET (EA) ORAL ONCE
Refills: 0 | Status: CANCELLED | OUTPATIENT
Start: 2022-07-25 | End: 2022-07-22

## 2022-07-20 RX ORDER — CEFAZOLIN SODIUM 1 G
1000 VIAL (EA) INJECTION EVERY 8 HOURS
Refills: 0 | Status: DISCONTINUED | OUTPATIENT
Start: 2022-07-20 | End: 2022-07-22

## 2022-07-20 RX ORDER — CEFAZOLIN SODIUM 1 G
VIAL (EA) INJECTION
Refills: 0 | Status: DISCONTINUED | OUTPATIENT
Start: 2022-07-20 | End: 2022-07-20

## 2022-07-20 RX ADMIN — LOSARTAN POTASSIUM 50 MILLIGRAM(S): 100 TABLET, FILM COATED ORAL at 05:08

## 2022-07-20 RX ADMIN — TRAMADOL HYDROCHLORIDE 25 MILLIGRAM(S): 50 TABLET ORAL at 06:10

## 2022-07-20 RX ADMIN — Medication 650 MILLIGRAM(S): at 04:04

## 2022-07-20 RX ADMIN — Medication 100 MILLIGRAM(S): at 12:04

## 2022-07-20 RX ADMIN — Medication 10 MILLIGRAM(S): at 16:24

## 2022-07-20 RX ADMIN — OXYCODONE AND ACETAMINOPHEN 1 TABLET(S): 5; 325 TABLET ORAL at 09:45

## 2022-07-20 RX ADMIN — Medication 25 MILLIGRAM(S): at 16:27

## 2022-07-20 RX ADMIN — Medication 100 MILLIGRAM(S): at 22:07

## 2022-07-20 RX ADMIN — Medication 100 MICROGRAM(S): at 05:09

## 2022-07-20 RX ADMIN — Medication 1 TABLET(S): at 16:27

## 2022-07-20 RX ADMIN — OXYCODONE AND ACETAMINOPHEN 1 TABLET(S): 5; 325 TABLET ORAL at 10:15

## 2022-07-20 RX ADMIN — OXYCODONE AND ACETAMINOPHEN 2 TABLET(S): 5; 325 TABLET ORAL at 16:27

## 2022-07-20 RX ADMIN — Medication 5 MILLIGRAM(S): at 22:07

## 2022-07-20 RX ADMIN — Medication 81 MILLIGRAM(S): at 17:10

## 2022-07-20 RX ADMIN — TRAMADOL HYDROCHLORIDE 25 MILLIGRAM(S): 50 TABLET ORAL at 05:41

## 2022-07-20 RX ADMIN — OXYCODONE AND ACETAMINOPHEN 2 TABLET(S): 5; 325 TABLET ORAL at 17:00

## 2022-07-20 NOTE — PROGRESS NOTE ADULT - SUBJECTIVE AND OBJECTIVE BOX
Patient is a 87y old  Female who presents with a chief complaint of RLE cellulitis (19 Jul 2022 22:22)      INTERVAL HPI/OVERNIGHT EVENTS:    MEDICATIONS  (STANDING):  ceFAZolin   IVPB      ceFAZolin   IVPB 2000 milliGRAM(s) IV Intermittent every 8 hours  heparin   Injectable 5000 Unit(s) SubCutaneous every 12 hours  levothyroxine 100 MICROGram(s) Oral daily  losartan 50 milliGRAM(s) Oral daily    MEDICATIONS  (PRN):  acetaminophen     Tablet .. 650 milliGRAM(s) Oral every 6 hours PRN Temp greater or equal to 38C (100.4F), Moderate Pain (4 - 6)  oxycodone    5 mG/acetaminophen 325 mG 1 Tablet(s) Oral every 6 hours PRN Severe Pain (7 - 10)      Allergies    codeine (Headache)  doxycycline (Hives)  scallops (Nausea)  shellfish (Nausea)    Intolerances        Vital Signs Last 24 Hrs  T(C): 36.7 (20 Jul 2022 11:15), Max: 36.8 (19 Jul 2022 18:52)  T(F): 98.1 (20 Jul 2022 11:15), Max: 98.3 (19 Jul 2022 18:52)  HR: 69 (20 Jul 2022 11:15) (61 - 69)  BP: 148/71 (20 Jul 2022 11:15) (147/74 - 166/71)  BP(mean): --  RR: 18 (20 Jul 2022 11:15) (18 - 18)  SpO2: 95% (20 Jul 2022 11:15) (95% - 97%)    Parameters below as of 20 Jul 2022 05:11  Patient On (Oxygen Delivery Method): room air        PHYSICAL EXAM:  GENERAL: NAD, well-groomed, well-developed  HEAD:  Atraumatic, Normocephalic  EYES: EOMI, PERRLA, conjunctiva and sclera clear  ENMT: No tonsillar erythema, exudates, or enlargement; Moist mucous membranes, Good dentition, No lesions  NECK: Supple, No JVD, Normal thyroid  NERVOUS SYSTEM:  Alert & Oriented X3, Good concentration; Motor Strength 5/5 B/L upper and lower extremities; DTRs 2+ intact and symmetric  CHEST/LUNG: Clear to auscultation bilaterally; No rales, rhonchi, wheezing, or rubs  HEART: Regular rate and rhythm; No murmurs, rubs, or gallops  ABDOMEN: Soft, Nontender, Nondistended; Bowel sounds present  EXTREMITIES:  2+ Peripheral Pulses, No clubbing, cyanosis, or edema  LYMPH: No lymphadenopathy noted  SKIN: No rashes or lesions    LABS:                        11.6   10.58 )-----------( 265      ( 20 Jul 2022 08:31 )             35.2     07-20    138  |  102  |  21  ----------------------------<  90  3.6   |  28  |  1.14    Ca    9.6      20 Jul 2022 08:31  Mg     2.0     07-19    TPro  6.7  /  Alb  3.2<L>  /  TBili  0.3  /  DBili  x   /  AST  32  /  ALT  44  /  AlkPhos  71  07-19  86 y/o F with history of OA, HTN, HLD, cataracts, Alessandra syndrome, Hypothyroidism, ANGELES not on CPAP, preDM, Hep B, and recent total R knee replacement surgery by Dr Jones (admitted July 6, 2022) p/w R ankle redness and swelling that started in her ankle and has spread up her leg to her calf and was admitted for RLE cellulitis. She is lying in bed in NAD.    MEDICATIONS  (STANDING):  ceFAZolin   IVPB      ceFAZolin   IVPB 2000 milliGRAM(s) IV Intermittent every 8 hours  heparin   Injectable 5000 Unit(s) SubCutaneous every 12 hours  levothyroxine 100 MICROGram(s) Oral daily  losartan 50 milliGRAM(s) Oral daily    MEDICATIONS  (PRN):  acetaminophen     Tablet .. 650 milliGRAM(s) Oral every 6 hours PRN Temp greater or equal to 38C (100.4F), Moderate Pain (4 - 6)  oxycodone    5 mG/acetaminophen 325 mG 1 Tablet(s) Oral every 6 hours PRN Severe Pain (7 - 10)      Allergies    codeine (Headache)  doxycycline (Hives)  scallops (Nausea)  shellfish (Nausea)    Intolerances        Vital Signs Last 24 Hrs  T(C): 36.7 (20 Jul 2022 11:15), Max: 36.8 (19 Jul 2022 18:52)  T(F): 98.1 (20 Jul 2022 11:15), Max: 98.3 (19 Jul 2022 18:52)  HR: 69 (20 Jul 2022 11:15) (61 - 69)  BP: 148/71 (20 Jul 2022 11:15) (147/74 - 166/71)   RR: 18 (20 Jul 2022 11:15) (18 - 18)  SpO2: 95% (20 Jul 2022 11:15) (95% - 97%)    Parameters below as of 20 Jul 2022 05:11  Patient On (Oxygen Delivery Method): room air        PHYSICAL EXAM:  GENERAL: NAD, well-groomed, well-developed  HEAD:  Atraumatic, Normocephalic  EYES: EOMI, PERRLA   NECK: Supple   NERVOUS SYSTEM:  Alert & Oriented X3, Good concentration   CHEST/LUNG: Clear to auscultation bilaterally; No rales, rhonchi, wheezing, or rubs  HEART: Regular rate and rhythm; No murmurs, rubs, or gallops  ABDOMEN: Soft, Nontender, Nondistended; Bowel sounds present  EXTREMITIES: RLE edema w/ ankle/foot warmth and erythema        LABS:                        11.6   10.58 )-----------( 265      ( 20 Jul 2022 08:31 )             35.2     07-20    138  |  102  |  21  ----------------------------<  90  3.6   |  28  |  1.14    Ca    9.6      20 Jul 2022 08:31  Mg     2.0     07-19    TPro  6.7  /  Alb  3.2<L>  /  TBili  0.3  /  DBili  x   /  AST  32  /  ALT  44  /  AlkPhos  71  07-19

## 2022-07-20 NOTE — PATIENT PROFILE ADULT - FALL HARM RISK - HARM RISK INTERVENTIONS

## 2022-07-20 NOTE — PHYSICAL THERAPY INITIAL EVALUATION ADULT - PERTINENT HX OF CURRENT PROBLEM, REHAB EVAL
Per H&P, 86 y/o F with PMHx OA, HTN, HLD, cataracts, and recent R TKA (July 6, 2022) c/o R ankle redness and swelling x 2 days

## 2022-07-20 NOTE — PHYSICAL THERAPY INITIAL EVALUATION ADULT - BALANCE TRAINING, PT EVAL
Pt will increase static/dynamic standing balance to normal to perform all functional mobility without LOB, by 2 weeks

## 2022-07-20 NOTE — CONSULT NOTE ADULT - SUBJECTIVE AND OBJECTIVE BOX
[facility]  INFECTIOUS DISEASES   [address] [place] [address]  Tel: [phone]     Fax: [phone]  ======================================================  Lange MD Jonathan Garellek, DO Char Amaya, ROCKY   ======================================================    PARISH WARD  MRN-[id number]        Patient is a 87y old  Female who presents with a chief complaint of RLE cellulitis (2022 13:11)      HPI:  88 y/o F with PMHx OA, HTN, HLD, cataracts, and recent R TKA (2022) c/o R ankle redness and swelling x 2 days. She states the redness started in her ankle and has spread up her leg to her calf within the past day. She also endorses the area starting to become itchy and scaly within the past day.  Patient also reports cellulitis in the same area 5-6 years ago.  Reports having venous doppler done yesterday outpatient, negative for DVT.  Denies fever, chills, pain with motion in ankle, dizziness, SOB, chest pain, n/v/c/d/abd pain.   (2022 22:22)      ID consulted for workup and antibiotic management     PAST MEDICAL & SURGICAL HISTORY:  Sheehans syndrome      Thyroid disease      HTN (hypertension)      History of hepatitis B      Prediabetes      HLD (hyperlipidemia)      H/O: hysterectomy      H/O:   x4      History of excision of pilonidal cyst      H/O intestinal obstruction      S/P total knee replacement, right  2022      History of cataract surgery  B/l eyes          Allergies  codeine (Headache)  doxycycline (Hives)  scallops (Nausea)  shellfish (Nausea)        ANTIMICROBIALS:  ceFAZolin   IVPB 1000 every 8 hours      MEDICATIONS  (STANDING):    ceFAZolin   IVPB   100 mL/Hr IV Intermittent (22 @ 12:04)    vancomycin  IVPB.   250 mL/Hr IV Intermittent (22 @ 20:05)        OTHER MEDS: MEDICATIONS  (STANDING):  aspirin enteric coated 81 two times a day  diphenhydrAMINE 25 every 6 hours PRN  hydrocortisone 10 daily  hydrocortisone 5 at bedtime  levothyroxine 100 daily  losartan 50 daily  metolazone 5 <User Schedule>  oxycodone    5 mG/acetaminophen 325 mG 2 every 6 hours PRN      SOCIAL HISTORY:       FAMILY HISTORY:  FH: HTN (hypertension)        REVIEW OF SYSTEMS  [  ] ROS unobtainable because:    [  ] All other systems negative except as noted below:	    Constitutional:  [ ] fever [ ] chills  [ ] weight loss  [ ] weakness  Skin:  [ ] rash [ ] phlebitis	  Eyes: [ ] icterus [ ] pain  [ ] discharge	  ENMT: [ ] sore throat  [ ] thrush [ ] ulcers [ ] exudates  Respiratory: [ ] dyspnea [ ] hemoptysis [ ] cough [ ] sputum	  Cardiovascular:  [ ] chest pain [ ] palpitations [ ] edema	  Gastrointestinal:  [ ] nausea [ ] vomiting [ ] diarrhea [ ] constipation [ ] pain	  Genitourinary:  [ ] dysuria [ ] frequency [ ] hematuria [ ] discharge [ ] flank pain  [ ] incontinence  Musculoskeletal:  [ ] myalgias [ ] arthralgias [ ] arthritis  [ ] back pain  Neurological:  [ ] headache [ ] seizures  [ ] confusion/altered mental status  Psychiatric:  [ ] anxiety [ ] depression	  Hematology/Lymphatics:  [ ] lymphadenopathy  Endocrine:  [ ] adrenal [ ] thyroid  Allergic/Immunologic:	 [ ] transplant [ ] seasonal    Vital Signs Last 24 Hrs  T(F): 98.6 (22 @ 16:15), Max: 98.6 (22 @ 16:15)    Vital Signs Last 24 Hrs  HR: 62 (22 @ 16:15) (61 - 69)  BP: 114/55 (22 @ 16:15) (114/55 - 166/71)  RR: 17 (22 @ 16:15)  SpO2: 95% (22 @ 16:15) (95% - 97%)  Wt(kg): --    PHYSICAL EXAM:  Constitutional: non-toxic, no distress  HEAD/EYES: anicteric, no conjunctival injection  ENT:  supple, no thrush  Cardiovascular:   normal S1, S2, no murmur, no edema  Respiratory:  clear BS bilaterally, no wheezes, no rales  GI:  soft, non-tender, normal bowel sounds  :  no phelan, no CVA tenderness  Musculoskeletal:  no synovitis, normal ROM  Neurologic: awake and alert, normal strength, no focal findings  Skin:  no rash, no erythema, no phlebitis  Heme/Onc: no lymphadenopathy   Psychiatric:  awake, alert, appropriate mood          WBC Count: 10.58 K/uL ( @ 08:31)  WBC Count: 11.85 K/uL ( @ 20:00)      Auto Neutrophil %: 72.7 % (22 @ 20:00)  Auto Neutrophil #: 8.60 K/uL *H* (22 @ 20:00)                            11.6   10.58 )-----------( 265      ( 2022 08:31 )             35.2           138  |  102  |  21  ----------------------------<  90  3.6   |  28  |  1.14    Ca    9.6      2022 08:31  Mg     2.0         TPro  6.7  /  Alb  3.2<L>  /  TBili  0.3  /  DBili  x   /  AST  32  /  ALT  44  /  AlkPhos  71        Creatinine Trend: 1.14<--, 1.18<--, 1.15<--, 1.42<--, 1.43<--, 1.16<--            MICROBIOLOGY:          v            C-Reactive Protein, Serum: 24 ()            SARS-CoV-2 Result: NotDetec (22 @ 20:00)      RADIOLOGY:   [facility]  INFECTIOUS DISEASES   [address] [place] [address]  Tel: [phone]     Fax: [phone]  ======================================================  Lange MD Jonathan Garellek, DO Char Amaya, ROCKY   ======================================================    PARISH WARD  MRN-[id number]        Patient is a 87y old  Female who presents with a chief complaint of RLE cellulitis (2022 13:11)      HPI:  86 y/o F with PMHx OA, HTN, HLD, cataracts, and recent R TKA (2022) c/o R ankle redness and swelling x 2 days. She states the redness started in her ankle and has spread up her leg to her calf within the past day. She also endorses the area starting to become itchy and scaly within the past day.  Patient also reports cellulitis in the same area 5-6 years ago.  Reports having venous doppler done yesterday outpatient, negative for DVT.  Denies fever, chills, pain with motion in ankle, dizziness, SOB, chest pain, n/v/c/d/abd pain.   (2022 22:22)      ID consulted for workup and antibiotic management     PAST MEDICAL & SURGICAL HISTORY:  Sheehans syndrome      Thyroid disease      HTN (hypertension)      History of hepatitis B      Prediabetes      HLD (hyperlipidemia)      H/O: hysterectomy      H/O:   x4      History of excision of pilonidal cyst      H/O intestinal obstruction      S/P total knee replacement, right  2022      History of cataract surgery  B/l eyes          Allergies  codeine (Headache)  doxycycline (Hives)  scallops (Nausea)  shellfish (Nausea)        ANTIMICROBIALS:  ceFAZolin   IVPB 1000 every 8 hours      MEDICATIONS  (STANDING):    ceFAZolin   IVPB   100 mL/Hr IV Intermittent (22 @ 12:04)    vancomycin  IVPB.   250 mL/Hr IV Intermittent (22 @ 20:05)        OTHER MEDS: MEDICATIONS  (STANDING):  aspirin enteric coated 81 two times a day  diphenhydrAMINE 25 every 6 hours PRN  hydrocortisone 10 daily  hydrocortisone 5 at bedtime  levothyroxine 100 daily  losartan 50 daily  metolazone 5 <User Schedule>  oxycodone    5 mG/acetaminophen 325 mG 2 every 6 hours PRN      SOCIAL HISTORY:     no toxic habits   FAMILY HISTORY:  FH: HTN (hypertension)        REVIEW OF SYSTEMS  [  ] ROS unobtainable because:    [X  ] All other systems negative except as noted below:	    Constitutional:  [ ] fever [ ] chills  [ ] weight loss  [ ] weakness  Skin:  [X ] rash [ ] phlebitis	  Eyes: [ ] icterus [ ] pain  [ ] discharge	  ENMT: [ ] sore throat  [ ] thrush [ ] ulcers [ ] exudates  Respiratory: [ ] dyspnea [ ] hemoptysis [ ] cough [ ] sputum	  Cardiovascular:  [ ] chest pain [ ] palpitations [ ] edema	  Gastrointestinal:  [ ] nausea [ ] vomiting [ ] diarrhea [ ] constipation [ ] pain	  Genitourinary:  [ ] dysuria [ ] frequency [ ] hematuria [ ] discharge [ ] flank pain  [ ] incontinence  Musculoskeletal:  [ ] myalgias [ ] arthralgias [ ] arthritis  [ ] back pain  Neurological:  [ ] headache [ ] seizures  [ ] confusion/altered mental status  Psychiatric:  [ ] anxiety [ ] depression	  Hematology/Lymphatics:  [ ] lymphadenopathy  Endocrine:  [ ] adrenal [ ] thyroid  Allergic/Immunologic:	 [ ] transplant [ ] seasonal    Vital Signs Last 24 Hrs  T(F): 98.6 (22 @ 16:15), Max: 98.6 (22 @ 16:15)    Vital Signs Last 24 Hrs  HR: 62 (22 @ 16:15) (61 - 69)  BP: 114/55 (22 @ 16:15) (114/55 - 166/71)  RR: 17 (22 @ 16:15)  SpO2: 95% (22 @ 16:15) (95% - 97%)  Wt(kg): --    PHYSICAL EXAM:  Constitutional: non-toxic, no distress  HEAD/EYES: anicteric, no conjunctival injection  ENT:  supple, no thrush  Cardiovascular:   normal S1, S2, no murmur, no edema  Respiratory:  clear BS bilaterally, no wheezes, no rales  GI:  soft, non-tender, normal bowel sounds  :  no phelan, no CVA tenderness  Musculoskeletal:  no synovitis, right knee surgical site with steristrips and is c/d/I  Neurologic: awake and alert, normal strength, no focal findings  Skin:  slight increased erythema, mild calor, +tenderness, skin tightness    Heme/Onc: no lymphadenopathy   Psychiatric:  awake, alert, appropriate mood          WBC Count: 10.58 K/uL ( @ 08:31)  WBC Count: 11.85 K/uL ( @ 20:00)      Auto Neutrophil %: 72.7 % (22 @ 20:00)  Auto Neutrophil #: 8.60 K/uL *H* (22 @ 20:00)                            11.6   10.58 )-----------( 265      ( 2022 08:31 )             35.2           138  |  102  |  21  ----------------------------<  90  3.6   |  28  |  1.14    Ca    9.6      2022 08:31  Mg     2.0         TPro  6.7  /  Alb  3.2<L>  /  TBili  0.3  /  DBili  x   /  AST  32  /  ALT  44  /  AlkPhos  71        Creatinine Trend: 1.14<--, 1.18<--, 1.15<--, 1.42<--, 1.43<--, 1.16<--            MICROBIOLOGY:    C-Reactive Protein, Serum: 24 ()    SARS-CoV-2 Result: NotDetec (22 @ 20:00)      RADIOLOGY:  < from: Xray Knee 3 Views, Right (22 @ 19:45) >  IMPRESSION: Right TKR unchanged in the appearance. No fracture   dislocation or focal bone destruction. Small suprapatellar effusion.    < end of copied text >

## 2022-07-20 NOTE — PROGRESS NOTE ADULT - ASSESSMENT
88 y/o F with history of OA, HTN, HLD, cataracts, Alessandra syndrome, Hypothyroidism, ANGELES not on CPAP, preDM, Hep B, and recent total R knee replacement surgery by Dr Jones (admitted July 6, 2022) p/w R ankle redness and swelling that started in her ankle and has spread up her leg to her calf and was admitted for RLE cellulitis.     RLE Cellulitis  - Ortho consult appreciated, they have no concern for PJI & recommended no acute orthopedic surgical intervention at this time - patient will need to follow up with Dr. Jones as outpatient at normal postop follow up for further evaluation and treatment  - X-ray showed no fracture dislocation or focal bone destruction  - start Ancef  - ID eval pending   - ESR is 32  - CRP is 24  - follow up blood cxs  - c/w Tylenol 650 mg PRN pain/fever    Hypothyroidism  - c/w Synthroid    Essential hypertension  - c/w losartan    Prophylaxis:    DVT: Heparin  GI: PO diet    88 y/o F with history of OA, HTN, HLD, cataracts, Alessandra syndrome, Hypothyroidism, ANGELES not on CPAP, preDM, Hep B, and recent total R knee replacement surgery by Dr Jones (admitted July 6, 2022) p/w R ankle redness and swelling that started in her ankle and has spread up her leg to her calf and was admitted for RLE cellulitis.     RLE Cellulitis  - Ortho consult appreciated, they have no concern for PJI & recommended no acute orthopedic surgical intervention at this time - patient will need to follow up with Dr. Jones as outpatient at normal postop follow up for further evaluation and treatment  - X-ray showed no fracture dislocation or focal bone destruction  - start Ancef  - ID eval pending   - ESR is 32  - CRP is 24  - follow up blood cxs  - c/w Percocet PRN pain w/ Benadryl for itching    Hypothyroidism  - c/w Synthroid    Alessandra syndrome  - c/w home Cortef  - c/w metolazone QOD w/ KCl    Essential hypertension  - c/w losartan    Prophylaxis:    DVT: Pt refused Heparin/Lovenox and has asked for ASA BID instead  GI: PO diet     I spoke w/ daughter, Ariella, and updated her to the pt's status and plan of care.

## 2022-07-20 NOTE — CONSULT NOTE ADULT - ASSESSMENT
86 y/o F with PMHx OA, HTN, HLD, cataracts, and recent R TKA (July 6, 2022) c/o R ankle redness and swelling x 2 days. She states the redness started in her ankle and has spread up her leg to her calf within the past day. She also endorses the area starting to become itchy and scaly within the past day.  Patient also reports cellulitis in the same area 5-6 years ago.  Reports having venous doppler done yesterday outpatient, negative for DVT.  Denies fever, chills, pain with motion in ankle, dizziness, SOB, chest pain, n/v/c/d/abd pain.     afebrile and stable vital signs   normal wbc   exam consistent with mild cellulitis   cultures were drawn  can be on a lower dose of ancef     Plan:  change cefazolin to 1g q8hrs   follow blood cultures   leg elevation   if her leg worsens, would obtain a CT but for now-serial exams   pain control   physical therapy   Hypertension: continue her antihypertensives, avoid extremes, pain control as this can drive up her BP     Discussed with Dr. Pablo Caruso, DO  Infectious Disease Attending  Reachable via Microsoft Teams or ID office: [phone]  After 5pm/weekends please call [phone] for all inquiries and new consults

## 2022-07-20 NOTE — PHYSICAL THERAPY INITIAL EVALUATION ADULT - ADDITIONAL COMMENTS
Pt lives alone in senior living apartment. 1 step to enter. Pt was receiving home PT prior to admission

## 2022-07-20 NOTE — PATIENT PROFILE ADULT - NSPROEXTENSIONSOFSELF_GEN_A_NUR
Pt has clothing, food wear, cellphone, own walker, cryo cuff cooler, a bag pack with content, and a shad pack. Pt has a pair of glasses, wearing a yellow metal chain with yellow metal pendant. Pt is also wearing a pair of yellow metal earrings and a yellow metal ring. Ot also has house keys./walker

## 2022-07-21 DIAGNOSIS — L03.115 CELLULITIS OF RIGHT LOWER LIMB: ICD-10-CM

## 2022-07-21 LAB
ANION GAP SERPL CALC-SCNC: 9 MMOL/L — SIGNIFICANT CHANGE UP (ref 5–17)
BUN SERPL-MCNC: 29 MG/DL — HIGH (ref 7–23)
CALCIUM SERPL-MCNC: 9.6 MG/DL — SIGNIFICANT CHANGE UP (ref 8.5–10.1)
CHLORIDE SERPL-SCNC: 98 MMOL/L — SIGNIFICANT CHANGE UP (ref 96–108)
CO2 SERPL-SCNC: 30 MMOL/L — SIGNIFICANT CHANGE UP (ref 22–31)
CREAT SERPL-MCNC: 1.48 MG/DL — HIGH (ref 0.5–1.3)
EGFR: 34 ML/MIN/1.73M2 — LOW
GLUCOSE SERPL-MCNC: 105 MG/DL — HIGH (ref 70–99)
HCT VFR BLD CALC: 35.6 % — SIGNIFICANT CHANGE UP (ref 34.5–45)
HGB BLD-MCNC: 11.5 G/DL — SIGNIFICANT CHANGE UP (ref 11.5–15.5)
MAGNESIUM SERPL-MCNC: 1.9 MG/DL — SIGNIFICANT CHANGE UP (ref 1.6–2.6)
MCHC RBC-ENTMCNC: 28.5 PG — SIGNIFICANT CHANGE UP (ref 27–34)
MCHC RBC-ENTMCNC: 32.3 G/DL — SIGNIFICANT CHANGE UP (ref 32–36)
MCV RBC AUTO: 88.3 FL — SIGNIFICANT CHANGE UP (ref 80–100)
NRBC # BLD: 0 /100 WBCS — SIGNIFICANT CHANGE UP (ref 0–0)
PHOSPHATE SERPL-MCNC: 3.9 MG/DL — SIGNIFICANT CHANGE UP (ref 2.5–4.5)
PLATELET # BLD AUTO: 265 K/UL — SIGNIFICANT CHANGE UP (ref 150–400)
POTASSIUM SERPL-MCNC: 3.7 MMOL/L — SIGNIFICANT CHANGE UP (ref 3.5–5.3)
POTASSIUM SERPL-SCNC: 3.7 MMOL/L — SIGNIFICANT CHANGE UP (ref 3.5–5.3)
RBC # BLD: 4.03 M/UL — SIGNIFICANT CHANGE UP (ref 3.8–5.2)
RBC # FLD: 14.3 % — SIGNIFICANT CHANGE UP (ref 10.3–14.5)
SODIUM SERPL-SCNC: 137 MMOL/L — SIGNIFICANT CHANGE UP (ref 135–145)
WBC # BLD: 10.47 K/UL — SIGNIFICANT CHANGE UP (ref 3.8–10.5)
WBC # FLD AUTO: 10.47 K/UL — SIGNIFICANT CHANGE UP (ref 3.8–10.5)

## 2022-07-21 PROCEDURE — 99232 SBSQ HOSP IP/OBS MODERATE 35: CPT

## 2022-07-21 RX ORDER — LIDOCAINE 4 G/100G
1 CREAM TOPICAL EVERY 24 HOURS
Refills: 0 | Status: DISCONTINUED | OUTPATIENT
Start: 2022-07-21 | End: 2022-07-22

## 2022-07-21 RX ORDER — AMLODIPINE BESYLATE 2.5 MG/1
2.5 TABLET ORAL DAILY
Refills: 0 | Status: DISCONTINUED | OUTPATIENT
Start: 2022-07-21 | End: 2022-07-22

## 2022-07-21 RX ORDER — OXYCODONE AND ACETAMINOPHEN 5; 325 MG/1; MG/1
1 TABLET ORAL EVERY 4 HOURS
Refills: 0 | Status: DISCONTINUED | OUTPATIENT
Start: 2022-07-21 | End: 2022-07-22

## 2022-07-21 RX ADMIN — Medication 20 MILLIEQUIVALENT(S): at 06:14

## 2022-07-21 RX ADMIN — LOSARTAN POTASSIUM 50 MILLIGRAM(S): 100 TABLET, FILM COATED ORAL at 06:13

## 2022-07-21 RX ADMIN — LIDOCAINE 1 PATCH: 4 CREAM TOPICAL at 20:34

## 2022-07-21 RX ADMIN — OXYCODONE AND ACETAMINOPHEN 2 TABLET(S): 5; 325 TABLET ORAL at 10:30

## 2022-07-21 RX ADMIN — Medication 100 MICROGRAM(S): at 06:12

## 2022-07-21 RX ADMIN — OXYCODONE AND ACETAMINOPHEN 1 TABLET(S): 5; 325 TABLET ORAL at 21:00

## 2022-07-21 RX ADMIN — Medication 81 MILLIGRAM(S): at 06:12

## 2022-07-21 RX ADMIN — Medication 10 MILLIGRAM(S): at 06:12

## 2022-07-21 RX ADMIN — Medication 1 TABLET(S): at 11:36

## 2022-07-21 RX ADMIN — OXYCODONE AND ACETAMINOPHEN 2 TABLET(S): 5; 325 TABLET ORAL at 09:37

## 2022-07-21 RX ADMIN — Medication 25 MILLIGRAM(S): at 15:36

## 2022-07-21 RX ADMIN — Medication 100 MILLIGRAM(S): at 13:43

## 2022-07-21 RX ADMIN — Medication 25 MILLIGRAM(S): at 22:24

## 2022-07-21 RX ADMIN — Medication 100 MILLIGRAM(S): at 06:13

## 2022-07-21 RX ADMIN — Medication 100 MILLIGRAM(S): at 22:22

## 2022-07-21 RX ADMIN — Medication 5 MILLIGRAM(S): at 22:22

## 2022-07-21 RX ADMIN — LIDOCAINE 1 PATCH: 4 CREAM TOPICAL at 17:39

## 2022-07-21 RX ADMIN — Medication 81 MILLIGRAM(S): at 17:42

## 2022-07-21 RX ADMIN — OXYCODONE AND ACETAMINOPHEN 1 TABLET(S): 5; 325 TABLET ORAL at 20:42

## 2022-07-21 NOTE — PROGRESS NOTE ADULT - SUBJECTIVE AND OBJECTIVE BOX
86 y/o F with history of OA, HTN, HLD, cataracts, Alessandra syndrome, Hypothyroidism, ANGELES not on CPAP, preDM, Hep B, and recent total R knee replacement surgery by Dr Jones (admitted July 6, 2022) p/w R ankle redness and swelling that started in her ankle and has spread up her leg to her calf and was admitted for RLE cellulitis. She is lying in bed in NAD.      MEDICATIONS  (STANDING):  aspirin enteric coated 81 milliGRAM(s) Oral two times a day  ceFAZolin   IVPB 1000 milliGRAM(s) IV Intermittent every 8 hours  hydrocortisone 10 milliGRAM(s) Oral daily  hydrocortisone 5 milliGRAM(s) Oral at bedtime  levothyroxine 100 MICROGram(s) Oral daily  lidocaine   4% Patch 1 Patch Transdermal every 24 hours  metolazone 5 milliGRAM(s) Oral <User Schedule>  multivitamin 1 Tablet(s) Oral daily    MEDICATIONS  (PRN):  diphenhydrAMINE 25 milliGRAM(s) Oral every 6 hours PRN Rash and/or Itching  oxycodone    5 mG/acetaminophen 325 mG 1 Tablet(s) Oral every 4 hours PRN Severe Pain (7 - 10)      Allergies    codeine (Headache)  doxycycline (Hives)  scallops (Nausea)  shellfish (Nausea)    Intolerances        Vital Signs Last 24 Hrs  T(C): 36.7 (21 Jul 2022 18:00), Max: 36.7 (21 Jul 2022 05:17)  T(F): 98 (21 Jul 2022 18:00), Max: 98.1 (21 Jul 2022 11:41)  HR: 81 (21 Jul 2022 18:56) (54 - 85)  BP: 106/75 (21 Jul 2022 18:56) (106/75 - 155/-)   RR: 18 (21 Jul 2022 18:00) (18 - 18)  SpO2: 98% (21 Jul 2022 18:56) (94% - 98%)    Parameters below as of 21 Jul 2022 18:56  Patient On (Oxygen Delivery Method): room air     PHYSICAL EXAM:  GENERAL: NAD, well-groomed, well-developed  HEAD:  Atraumatic, Normocephalic  EYES: EOMI, PERRLA   NECK: Supple   NERVOUS SYSTEM:  Alert & Oriented X3, Good concentration   CHEST/LUNG: Clear to auscultation bilaterally; No rales, rhonchi, wheezing, or rubs  HEART: Regular rate and rhythm; No murmurs, rubs, or gallops  ABDOMEN: Soft, Nontender, Nondistended; Bowel sounds present  EXTREMITIES: RLE edema w/ ankle/foot warmth and erythema are much improved    LABS:                        11.5   10.47 )-----------( 265      ( 21 Jul 2022 06:30 )             35.6     07-21    137  |  98  |  29<H>  ----------------------------<  105<H>  3.7   |  30  |  1.48<H>    Ca    9.6      21 Jul 2022 06:30  Phos  3.9     07-21  Mg     1.9     07-21       Culture - Blood (collected 19 Jul 2022 20:00)  Source: .Blood Blood  Preliminary Report (21 Jul 2022 02:02):    No growth to date.    Culture - Blood (collected 19 Jul 2022 20:00)  Source: .Blood Blood  Preliminary Report (21 Jul 2022 02:02):    No growth to date.      RADIOLOGY & ADDITIONAL TESTS:    07-20-22 @ 07:01  -  07-21-22 @ 07:00  --------------------------------------------------------  IN:    IV PiggyBack: 100 mL  Total IN: 100 mL    OUT:  Total OUT: 0 mL    Total NET: 100 mL

## 2022-07-21 NOTE — PROGRESS NOTE ADULT - ASSESSMENT
86 y/o F with PMHx OA, HTN, HLD, cataracts, and recent R TKA (July 6, 2022) c/o R ankle redness and swelling x 2 days. She states the redness started in her ankle and has spread up her leg to her calf within the past day. She also endorses the area starting to become itchy and scaly within the past day.  Patient also reports cellulitis in the same area 5-6 years ago.  Reports having venous doppler done yesterday outpatient, negative for DVT.  Denies fever, chills, pain with motion in ankle, dizziness, SOB, chest pain, n/v/c/d/abd pain.     afebrile and stable vital signs   normal wbc   exam consistent with mild cellulitis   cultures were drawn  can be on a lower dose of ancef     7/21: no fevers, WBC normalized, BCs with no growth to date, Cr is elevated, cellulitis site is improving, will continue with cefazolin IV.     Plan:  continue cefazolin to 1g q8hrs   follow blood cultures NGTD  leg elevation   if her leg worsens, would obtain a CT but for now-serial exams   pain control   physical therapy   Hypertension: continue her antihypertensives, avoid extremes, pain control as this can drive up her BP     Discussed with Dr. Shah  86 y/o F with PMHx OA, HTN, HLD, cataracts, and recent R TKA (July 6, 2022) c/o R ankle redness and swelling x 2 days. She states the redness started in her ankle and has spread up her leg to her calf within the past day. She also endorses the area starting to become itchy and scaly within the past day.  Patient also reports cellulitis in the same area 5-6 years ago.  Reports having venous doppler done yesterday outpatient, negative for DVT.  Denies fever, chills, pain with motion in ankle, dizziness, SOB, chest pain, n/v/c/d/abd pain.     afebrile and stable vital signs   normal wbc   exam consistent with mild cellulitis   cultures were drawn  can be on a lower dose of ancef     7/21: no fevers, WBC normalized, BCs with no growth to date, cellulitis site is improving, will continue with cefazolin IV.     Plan:  continue cefazolin to 1g q8hrs   follow blood cultures NGTD  leg elevation   if her leg worsens, would obtain a CT but for now-serial exams   pain control   physical therapy   Hypertension: continue her antihypertensives, avoid extremes, pain control as this can drive up her BP     Discussed with Dr. Shah

## 2022-07-21 NOTE — PROGRESS NOTE ADULT - SUBJECTIVE AND OBJECTIVE BOX
PARISH WARD  MRN-[id number]    Follow Up:  cellulitis, right knee small effusion    Interval History: the pt was seen and examined earlier, no distress, states that she is feeling better overall, out of bed to chair. The pt is afebrile, on RA, WBC  normalized, Cr elevated 1.48. The pt complains of lower back pain - not new, asking for a lidocaine patch.     PAST MEDICAL & SURGICAL HISTORY:  Sheehans syndrome      Thyroid disease      HTN (hypertension)      History of hepatitis B      Prediabetes      HLD (hyperlipidemia)      H/O: hysterectomy      H/O:   x4      History of excision of pilonidal cyst      H/O intestinal obstruction      S/P total knee replacement, right  2022      History of cataract surgery  B/l eyes          ROS:    [ ] Unobtainable because:  [ x] All other systems negative    Constitutional: no fever, no chills  Head: no trauma  Eyes: no vision changes, no eye pain  ENT:  no sore throat, no rhinorrhea  Cardiovascular:  no chest pain, no palpitation  Respiratory:  no SOB, no cough  GI:  no abd pain, no vomiting, no diarrhea  urinary: no dysuria, no hematuria, no flank pain  musculoskeletal:  chronic back pain, right knee pain is controlled  skin:  no rash  neurology:  no headache, no seizure, no change in mental status  psych: no anxiety, no depression         Allergies  codeine (Headache)  doxycycline (Hives)  scallops (Nausea)  shellfish (Nausea)        ANTIMICROBIALS:  ceFAZolin   IVPB 1000 every 8 hours      OTHER MEDS:  aspirin enteric coated 81 milliGRAM(s) Oral two times a day  diphenhydrAMINE 25 milliGRAM(s) Oral every 6 hours PRN  hydrocortisone 10 milliGRAM(s) Oral daily  hydrocortisone 5 milliGRAM(s) Oral at bedtime  levothyroxine 100 MICROGram(s) Oral daily  metolazone 5 milliGRAM(s) Oral <User Schedule>  multivitamin 1 Tablet(s) Oral daily  oxycodone    5 mG/acetaminophen 325 mG 1 Tablet(s) Oral every 4 hours PRN      Vital Signs Last 24 Hrs  T(C): 36.7 (2022 11:41), Max: 37 (2022 16:15)  T(F): 98.1 (2022 11:41), Max: 98.6 (2022 16:15)  HR: 85 (2022 11:41) (54 - 85)  BP: 143/78 (2022 11:41) (114/55 - 155/-)  BP(mean): --  RR: 18 (2022 11:41) (17 - 18)  SpO2: 97% (2022 11:41) (94% - 97%)    Parameters below as of 2022 07:54  Patient On (Oxygen Delivery Method): room air        Physical Exam:  Constitutional: non-toxic, no distress  HEAD/EYES: anicteric, no conjunctival injection  ENT:  supple, no thrush  Cardiovascular:   normal S1, S2, no murmur, no edema  Respiratory:  clear BS bilaterally, no wheezes, no rales  GI:  soft, non-tender, normal bowel sounds  :  no phelan, no CVA tenderness  Musculoskeletal:  no synovitis, right knee surgical site is c/d/I  Neurologic: awake and alert, normal strength, no focal findings  Skin:  slight increased erythema - better, mild calor, +mild tenderness, less of skin tightness    Heme/Onc: no lymphadenopathy   Psychiatric:  awake, alert, appropriate mood      WBC Count: 10.47 K/uL ( @ 06:30)  WBC Count: 10.58 K/uL ( @ 08:31)  WBC Count: 11.85 K/uL ( @ 20:00)                            11.5   10.47 )-----------( 265      ( 2022 06:30 )             35.6           137  |  98  |  29<H>  ----------------------------<  105<H>  3.7   |  30  |  1.48<H>    Ca    9.6      2022 06:30  Phos  3.9       Mg     1.9         TPro  6.7  /  Alb  3.2<L>  /  TBili  0.3  /  DBili  x   /  AST  32  /  ALT  44  /  AlkPhos  71            Creatinine Trend: 1.48<--, 1.14<--, 1.18<--, 1.15<--, 1.42<--, 1.43<--      MICROBIOLOGY:  v  .Blood Blood  22   No growth to date.  --  --      C-Reactive Protein, Serum: 24 ()    SARS-CoV-2 Result: NotDetec (22 @ 20:00)      RADIOLOGY:

## 2022-07-21 NOTE — PROGRESS NOTE ADULT - NS ATTEND AMEND GEN_ALL_CORE FT
cellulitis is improving   if tomorrow exam is better, transition to PO Keflex 500mg 4 times a day to complete 10 days total   creatinine has been 1.4, seems to fluctuate, monitor and encourage hydration     Discussed with Dr. Pablo Caruso, DO  Infectious Disease Attending  Reachable via Microsoft Teams or ID office: [phone]  After 5pm/weekends please call [phone] for all inquiries and new consults

## 2022-07-21 NOTE — PROGRESS NOTE ADULT - ASSESSMENT
88 y/o F with history of OA, HTN, HLD, cataracts, Alessandra syndrome, Hypothyroidism, ANGELES not on CPAP, preDM, Hep B, and recent total R knee replacement surgery by Dr Jones (admitted July 6, 2022) p/w R ankle redness and swelling that started in her ankle and has spread up her leg to her calf and was admitted for RLE cellulitis.     RLE Cellulitis  - Ortho consult appreciated, they have no concern for PJI & recommended no acute orthopedic surgical intervention at this time - patient will need to follow up with Dr. Jones as outpatient at normal postop follow up for further evaluation and treatment  - X-ray showed no fracture dislocation or focal bone destruction  - start Ancef  - ID eval pending   - ESR is 32  - CRP is 24  - follow up blood cxs  - c/w Percocet PRN pain w/ Benadryl for itching    MIKE  - hold losartan and metolazone    Hypothyroidism  - c/w Synthroid    Alessandra syndrome  - c/w home Cortef  - hold metolazone QOD w/ KCl due to MIKE    Essential hypertension  - hold losartan due to MIKE and start Norvasc instead    Prophylaxis:    DVT: Pt refused Heparin/Lovenox and has asked for ASA BID instead  GI: PO diet     I spoke w/ daughter, Ariella, and updated her to the pt's status and plan of care.

## 2022-07-22 ENCOUNTER — TRANSCRIPTION ENCOUNTER (OUTPATIENT)
Age: 87
End: 2022-07-22

## 2022-07-22 VITALS
OXYGEN SATURATION: 97 % | RESPIRATION RATE: 17 BRPM | TEMPERATURE: 98 F | SYSTOLIC BLOOD PRESSURE: 129 MMHG | DIASTOLIC BLOOD PRESSURE: 77 MMHG | HEART RATE: 62 BPM

## 2022-07-22 LAB
ANION GAP SERPL CALC-SCNC: 6 MMOL/L — SIGNIFICANT CHANGE UP (ref 5–17)
BUN SERPL-MCNC: 37 MG/DL — HIGH (ref 7–23)
CALCIUM SERPL-MCNC: 9.1 MG/DL — SIGNIFICANT CHANGE UP (ref 8.5–10.1)
CHLORIDE SERPL-SCNC: 105 MMOL/L — SIGNIFICANT CHANGE UP (ref 96–108)
CO2 SERPL-SCNC: 29 MMOL/L — SIGNIFICANT CHANGE UP (ref 22–31)
CREAT SERPL-MCNC: 1.33 MG/DL — HIGH (ref 0.5–1.3)
EGFR: 39 ML/MIN/1.73M2 — LOW
GLUCOSE SERPL-MCNC: 98 MG/DL — SIGNIFICANT CHANGE UP (ref 70–99)
HCT VFR BLD CALC: 34.5 % — SIGNIFICANT CHANGE UP (ref 34.5–45)
HGB BLD-MCNC: 11.4 G/DL — LOW (ref 11.5–15.5)
MAGNESIUM SERPL-MCNC: 2.1 MG/DL — SIGNIFICANT CHANGE UP (ref 1.6–2.6)
MCHC RBC-ENTMCNC: 29.3 PG — SIGNIFICANT CHANGE UP (ref 27–34)
MCHC RBC-ENTMCNC: 33 G/DL — SIGNIFICANT CHANGE UP (ref 32–36)
MCV RBC AUTO: 88.7 FL — SIGNIFICANT CHANGE UP (ref 80–100)
NRBC # BLD: 0 /100 WBCS — SIGNIFICANT CHANGE UP (ref 0–0)
PHOSPHATE SERPL-MCNC: 3.4 MG/DL — SIGNIFICANT CHANGE UP (ref 2.5–4.5)
PLATELET # BLD AUTO: 245 K/UL — SIGNIFICANT CHANGE UP (ref 150–400)
POTASSIUM SERPL-MCNC: 3.6 MMOL/L — SIGNIFICANT CHANGE UP (ref 3.5–5.3)
POTASSIUM SERPL-SCNC: 3.6 MMOL/L — SIGNIFICANT CHANGE UP (ref 3.5–5.3)
RBC # BLD: 3.89 M/UL — SIGNIFICANT CHANGE UP (ref 3.8–5.2)
RBC # FLD: 14.4 % — SIGNIFICANT CHANGE UP (ref 10.3–14.5)
SODIUM SERPL-SCNC: 140 MMOL/L — SIGNIFICANT CHANGE UP (ref 135–145)
WBC # BLD: 9.92 K/UL — SIGNIFICANT CHANGE UP (ref 3.8–10.5)
WBC # FLD AUTO: 9.92 K/UL — SIGNIFICANT CHANGE UP (ref 3.8–10.5)

## 2022-07-22 PROCEDURE — 99239 HOSP IP/OBS DSCHRG MGMT >30: CPT

## 2022-07-22 RX ORDER — CEPHALEXIN 500 MG
1 CAPSULE ORAL
Qty: 24 | Refills: 0
Start: 2022-07-22 | End: 2022-07-29

## 2022-07-22 RX ADMIN — LIDOCAINE 1 PATCH: 4 CREAM TOPICAL at 05:10

## 2022-07-22 RX ADMIN — Medication 1 TABLET(S): at 11:32

## 2022-07-22 RX ADMIN — Medication 10 MILLIGRAM(S): at 05:07

## 2022-07-22 RX ADMIN — Medication 100 MILLIGRAM(S): at 05:07

## 2022-07-22 RX ADMIN — Medication 100 MILLIGRAM(S): at 14:50

## 2022-07-22 RX ADMIN — Medication 100 MICROGRAM(S): at 05:06

## 2022-07-22 RX ADMIN — AMLODIPINE BESYLATE 2.5 MILLIGRAM(S): 2.5 TABLET ORAL at 05:06

## 2022-07-22 RX ADMIN — Medication 81 MILLIGRAM(S): at 05:06

## 2022-07-22 NOTE — DISCHARGE NOTE PROVIDER - HOSPITAL COURSE
88 y/o F with history of OA, HTN, HLD, cataracts, Alessandra syndrome, Hypothyroidism, ANGELES not on CPAP, preDM, Hep B, and recent total R knee replacement surgery by Dr Jones (admitted July 6, 2022) p/w R ankle redness and swelling that started in her ankle and has spread up her leg to her calf and was admitted for RLE cellulitis. Ortho was consult & had no concern for PJI. They recommended no acute orthopedic surgical intervention. X-ray showed no fracture dislocation or focal bone destruction. Pt was put on Ancef, and, as per my conversation w/ ID, was discharged on Keflex. Of note, she also had MIKE that improved when losartan and metolazone were held.     Discharge time: 43 minutes     Prophylaxis:    DVT: Pt refused Heparin/Lovenox and has asked for ASA BID

## 2022-07-22 NOTE — DISCHARGE NOTE PROVIDER - NSDCFUSCHEDAPPT_GEN_ALL_CORE_FT
Kimani Jones  [facility] Physician [facility]  [address]  Scheduled Appointment: 07/26/2022

## 2022-07-22 NOTE — DISCHARGE NOTE PROVIDER - NSDCCPCAREPLAN_GEN_ALL_CORE_FT
PRINCIPAL DISCHARGE DIAGNOSIS  Diagnosis: Cellulitis of right leg  Assessment and Plan of Treatment:

## 2022-07-22 NOTE — CHART NOTE - NSCHARTNOTEFT_GEN_A_CORE
Pt seen in am with Dr Jones.  RLE: Prineo dressing C/D/I. ROM 0-90*. Able to SLR. Cellulitis improving. +TTP lower leg.   Xrays reviewed: Normal post op changes.   Plan: DC home today with PO abx per medicine/ID. F/U in office with Dr Jones on Tues (pt has appt scheduled.)

## 2022-07-22 NOTE — DISCHARGE NOTE NURSING/CASE MANAGEMENT/SOCIAL WORK - PATIENT PORTAL LINK FT
You can access the FollowMyHealth Patient Portal offered by Health system by registering at the following website: [email or web address]. By joining [employer]’s FollowMyHealth portal, you will also be able to view your health information using other applications (apps) compatible with our system.

## 2022-07-22 NOTE — DISCHARGE NOTE PROVIDER - NSDCMRMEDTOKEN_GEN_ALL_CORE_FT
acetaminophen 325 mg oral tablet: 3 tab(s) orally every 8 hours  ascorbic acid 500 mg oral tablet: 1 tab(s) orally 2 times a day  Aspirin Enteric Coated 81 mg oral delayed release tablet: 1 tab(s) orally 2 times a day MDD:2  cephalexin 500 mg oral capsule: 1 cap(s) orally every 8 hours   hydrocortisone 10 mg oral tablet: 1 tab(s) orally once a day in AM  hydrocortisone 5 mg oral tablet: 1 tab(s) orally once a day in PM  levothyroxine 100 mcg (0.1 mg) oral tablet: 1 tab(s) orally once a day  losartan-hydrochlorothiazide 50 mg-12.5 mg oral tablet: 1 tab(s) orally once a day  metOLazone 5 mg oral tablet: 5 milligram(s) orally every other day  Multiple Vitamins oral tablet: 1 tab(s) orally once a day  Narcan 4 mg/0.1 mL nasal spray: 4 milligram(s) intranasally once. repeat as necessary   As needed. For suspected overdose. Follow instructions on packet. MDD:0.2ml  pantoprazole 40 mg oral delayed release tablet: 1 tab(s) orally once a day (before a meal) MDD:1  polyethylene glycol 3350 oral powder for reconstitution: 17 gram(s) orally once a day (at bedtime)  potassium chloride 20 mEq oral tablet, extended release: 1 tab(s) orally every other day w/ metolazone  Repatha 140 mg/mL subcutaneous solution: subcutaneous 2 times a month  senna leaf extract oral tablet: 2 tab(s) orally once a day (at bedtime)  traMADol 50 mg oral tablet: 1 tab(s) orally every 4 hours, As Needed -Mild Pain (5-10) May take 1/2 tab for lesser pain MDD:8 tablets (400 mg/day)    Do not exceed 8 tabs/day.

## 2022-07-25 LAB
CULTURE RESULTS: SIGNIFICANT CHANGE UP
CULTURE RESULTS: SIGNIFICANT CHANGE UP
SPECIMEN SOURCE: SIGNIFICANT CHANGE UP
SPECIMEN SOURCE: SIGNIFICANT CHANGE UP

## 2022-07-26 ENCOUNTER — APPOINTMENT (OUTPATIENT)
Dept: ORTHOPEDIC SURGERY | Facility: CLINIC | Age: 87
End: 2022-07-26

## 2022-07-26 VITALS — BODY MASS INDEX: 33.99 KG/M2 | WEIGHT: 180 LBS | HEIGHT: 61 IN

## 2022-07-26 DIAGNOSIS — I10 ESSENTIAL (PRIMARY) HYPERTENSION: ICD-10-CM

## 2022-07-26 PROCEDURE — 99024 POSTOP FOLLOW-UP VISIT: CPT

## 2022-07-26 PROCEDURE — 73562 X-RAY EXAM OF KNEE 3: CPT | Mod: RT

## 2022-07-26 NOTE — HISTORY OF PRESENT ILLNESS
[7] : 7 [3] : 3 [Dull/Aching] : dull/aching [Localized] : localized [Sharp] : sharp [de-identified] : 7/26/22: 3 weeks postop R TKA having fevers/chills and has a cough and sinus blockage. Had a recent episode up of cellulitis and went to the ED. Having swelling and pain in the knee as well. Is currently taking Abx. \par \par Prev doc:\par 3/14/22: Pain in the right knee for multiple years that has gotten progressively worse. She has tried HA injections 2x, the first one helped for about 3 worse but her most recent series provided no benefit. She has done PT with slight benefit. She also tired CSI that lasted for 3 days [] : This patient has had an injection before: no [FreeTextEntry1] : R Knee [FreeTextEntry3] : 7/6/22 [FreeTextEntry5] : 88 Y/O RHD F eval R Knee S/P R TKA on above date pt states recovery is progressing well but pain still persists with activity  [FreeTextEntry7] : Up into the R Thigh/ Down into the R Calf  [de-identified] : Home/Out patient PT 3x wkly  [de-identified] : 7/6/22 [de-identified] : HOSSEIN CRESPO

## 2022-07-26 NOTE — DISCUSSION/SUMMARY
[de-identified] : The incision was inspected and was clean and dry with no drainage.  The patient was instructed to call for fevers, chills, wound drainage, wound opening, redness, or any other concerns.\par \par Entered by Inga Alcantara acting as scribe.\par \par

## 2022-07-26 NOTE — IMAGING
[de-identified] : Effected side: Right knee\par Incision is clean and dry with no drainage - dressing removed -\par Sensation intact\par +1 edema LE\par Mild effusion \par Decreased ROM: 3-90\par \par Xray 3 views knee - Implants good position and well fixed - no fracture or dislocation\par \par

## 2022-07-26 NOTE — ASSESSMENT
[FreeTextEntry1] : 3/14/22: Patient with advanced OA in b/l knees, right is worse than the left. She has failed all forms of conservative treatment and is ready to proceed with TKA. Risks and benefits discussed. Hx of bleeding on Aspirin in her gums. Has a wedding in June and will plan for TKA after this. 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 IS on steroids 15 mg daily\par \par 7/26/22: 3 weeks postop had a recent episode of cellulitis and has been taking Abx. Having fevers/chills postop and will follow up with DAQUAN Lorenzo at the end of this week if this continues. may need readmission for more IV abx - no sign of knee infection -  Advised to follow up with PCP.

## 2022-07-27 DIAGNOSIS — I10 ESSENTIAL (PRIMARY) HYPERTENSION: ICD-10-CM

## 2022-07-27 DIAGNOSIS — Z91.81 HISTORY OF FALLING: ICD-10-CM

## 2022-07-27 DIAGNOSIS — Z88.5 ALLERGY STATUS TO NARCOTIC AGENT: ICD-10-CM

## 2022-07-27 DIAGNOSIS — N17.9 ACUTE KIDNEY FAILURE, UNSPECIFIED: ICD-10-CM

## 2022-07-27 DIAGNOSIS — R73.03 PREDIABETES: ICD-10-CM

## 2022-07-27 DIAGNOSIS — L03.115 CELLULITIS OF RIGHT LOWER LIMB: ICD-10-CM

## 2022-07-27 DIAGNOSIS — Z91.013 ALLERGY TO SEAFOOD: ICD-10-CM

## 2022-07-27 DIAGNOSIS — E23.0 HYPOPITUITARISM: ICD-10-CM

## 2022-07-27 DIAGNOSIS — G47.33 OBSTRUCTIVE SLEEP APNEA (ADULT) (PEDIATRIC): ICD-10-CM

## 2022-07-27 DIAGNOSIS — Z88.8 ALLERGY STATUS TO OTHER DRUGS, MEDICAMENTS AND BIOLOGICAL SUBSTANCES STATUS: ICD-10-CM

## 2022-07-27 DIAGNOSIS — E07.9 DISORDER OF THYROID, UNSPECIFIED: ICD-10-CM

## 2022-07-27 DIAGNOSIS — E78.5 HYPERLIPIDEMIA, UNSPECIFIED: ICD-10-CM

## 2022-07-27 DIAGNOSIS — F17.210 NICOTINE DEPENDENCE, CIGARETTES, UNCOMPLICATED: ICD-10-CM

## 2022-07-27 DIAGNOSIS — M17.11 UNILATERAL PRIMARY OSTEOARTHRITIS, RIGHT KNEE: ICD-10-CM

## 2022-07-29 ENCOUNTER — APPOINTMENT (OUTPATIENT)
Dept: ORTHOPEDIC SURGERY | Facility: CLINIC | Age: 87
End: 2022-07-29

## 2022-07-31 ENCOUNTER — INPATIENT (INPATIENT)
Facility: HOSPITAL | Age: 87
LOS: 3 days | Discharge: HOME | End: 2022-08-04
Attending: INTERNAL MEDICINE | Admitting: INTERNAL MEDICINE

## 2022-07-31 VITALS
TEMPERATURE: 98 F | SYSTOLIC BLOOD PRESSURE: 145 MMHG | OXYGEN SATURATION: 97 % | HEIGHT: 61 IN | DIASTOLIC BLOOD PRESSURE: 68 MMHG | HEART RATE: 54 BPM | RESPIRATION RATE: 18 BRPM | WEIGHT: 179.9 LBS

## 2022-07-31 DIAGNOSIS — Z98.49 CATARACT EXTRACTION STATUS, UNSPECIFIED EYE: Chronic | ICD-10-CM

## 2022-07-31 DIAGNOSIS — L03.115 CELLULITIS OF RIGHT LOWER LIMB: ICD-10-CM

## 2022-07-31 DIAGNOSIS — Z87.19 PERSONAL HISTORY OF OTHER DISEASES OF THE DIGESTIVE SYSTEM: Chronic | ICD-10-CM

## 2022-07-31 DIAGNOSIS — Z96.651 PRESENCE OF RIGHT ARTIFICIAL KNEE JOINT: Chronic | ICD-10-CM

## 2022-07-31 DIAGNOSIS — Z90.710 ACQUIRED ABSENCE OF BOTH CERVIX AND UTERUS: Chronic | ICD-10-CM

## 2022-07-31 DIAGNOSIS — Z98.890 OTHER SPECIFIED POSTPROCEDURAL STATES: Chronic | ICD-10-CM

## 2022-07-31 DIAGNOSIS — Z98.891 HISTORY OF UTERINE SCAR FROM PREVIOUS SURGERY: Chronic | ICD-10-CM

## 2022-07-31 DIAGNOSIS — E23.0 HYPOPITUITARISM: ICD-10-CM

## 2022-07-31 DIAGNOSIS — U07.1 COVID-19: ICD-10-CM

## 2022-07-31 DIAGNOSIS — E07.9 DISORDER OF THYROID, UNSPECIFIED: ICD-10-CM

## 2022-07-31 LAB
ALBUMIN SERPL ELPH-MCNC: 3.4 G/DL — SIGNIFICANT CHANGE UP (ref 3.3–5)
ALP SERPL-CCNC: 80 U/L — SIGNIFICANT CHANGE UP (ref 40–120)
ALT FLD-CCNC: 22 U/L — SIGNIFICANT CHANGE UP (ref 12–78)
ANION GAP SERPL CALC-SCNC: 7 MMOL/L — SIGNIFICANT CHANGE UP (ref 5–17)
ANION GAP SERPL CALC-SCNC: 7 MMOL/L — SIGNIFICANT CHANGE UP (ref 5–17)
AST SERPL-CCNC: 28 U/L — SIGNIFICANT CHANGE UP (ref 15–37)
BASOPHILS # BLD AUTO: 0.03 K/UL — SIGNIFICANT CHANGE UP (ref 0–0.2)
BASOPHILS NFR BLD AUTO: 0.5 % — SIGNIFICANT CHANGE UP (ref 0–2)
BILIRUB DIRECT SERPL-MCNC: 0.1 MG/DL — SIGNIFICANT CHANGE UP (ref 0–0.3)
BILIRUB INDIRECT FLD-MCNC: 0.3 MG/DL — SIGNIFICANT CHANGE UP (ref 0.2–1)
BILIRUB SERPL-MCNC: 0.4 MG/DL — SIGNIFICANT CHANGE UP (ref 0.2–1.2)
BUN SERPL-MCNC: 27 MG/DL — HIGH (ref 7–23)
BUN SERPL-MCNC: 27 MG/DL — HIGH (ref 7–23)
CALCIUM SERPL-MCNC: 8.9 MG/DL — SIGNIFICANT CHANGE UP (ref 8.5–10.1)
CALCIUM SERPL-MCNC: 9.2 MG/DL — SIGNIFICANT CHANGE UP (ref 8.5–10.1)
CHLORIDE SERPL-SCNC: 102 MMOL/L — SIGNIFICANT CHANGE UP (ref 96–108)
CHLORIDE SERPL-SCNC: 99 MMOL/L — SIGNIFICANT CHANGE UP (ref 96–108)
CK SERPL-CCNC: 32 U/L — SIGNIFICANT CHANGE UP (ref 26–192)
CO2 SERPL-SCNC: 27 MMOL/L — SIGNIFICANT CHANGE UP (ref 22–31)
CO2 SERPL-SCNC: 27 MMOL/L — SIGNIFICANT CHANGE UP (ref 22–31)
CREAT SERPL-MCNC: 1.29 MG/DL — SIGNIFICANT CHANGE UP (ref 0.5–1.3)
CREAT SERPL-MCNC: 1.52 MG/DL — HIGH (ref 0.5–1.3)
CRP SERPL-MCNC: 8 MG/L — HIGH
CRP SERPL-MCNC: 9 MG/L — HIGH
D DIMER BLD IA.RAPID-MCNC: 2034 NG/ML DDU — HIGH
EGFR: 33 ML/MIN/1.73M2 — LOW
EGFR: 40 ML/MIN/1.73M2 — LOW
EOSINOPHIL # BLD AUTO: 0.27 K/UL — SIGNIFICANT CHANGE UP (ref 0–0.5)
EOSINOPHIL NFR BLD AUTO: 4.9 % — SIGNIFICANT CHANGE UP (ref 0–6)
ERYTHROCYTE [SEDIMENTATION RATE] IN BLOOD: 25 MM/HR — HIGH (ref 0–20)
FERRITIN SERPL-MCNC: 387 NG/ML — HIGH (ref 15–150)
FLUAV AG NPH QL: SIGNIFICANT CHANGE UP
FLUBV AG NPH QL: SIGNIFICANT CHANGE UP
GLUCOSE SERPL-MCNC: 93 MG/DL — SIGNIFICANT CHANGE UP (ref 70–99)
GLUCOSE SERPL-MCNC: 98 MG/DL — SIGNIFICANT CHANGE UP (ref 70–99)
HCT VFR BLD CALC: 35 % — SIGNIFICANT CHANGE UP (ref 34.5–45)
HGB BLD-MCNC: 11.6 G/DL — SIGNIFICANT CHANGE UP (ref 11.5–15.5)
IMM GRANULOCYTES NFR BLD AUTO: 0.4 % — SIGNIFICANT CHANGE UP (ref 0–1.5)
LACTATE SERPL-SCNC: 1.2 MMOL/L — SIGNIFICANT CHANGE UP (ref 0.7–2)
LYMPHOCYTES # BLD AUTO: 1.78 K/UL — SIGNIFICANT CHANGE UP (ref 1–3.3)
LYMPHOCYTES # BLD AUTO: 32.1 % — SIGNIFICANT CHANGE UP (ref 13–44)
MCHC RBC-ENTMCNC: 28.9 PG — SIGNIFICANT CHANGE UP (ref 27–34)
MCHC RBC-ENTMCNC: 33.1 G/DL — SIGNIFICANT CHANGE UP (ref 32–36)
MCV RBC AUTO: 87.1 FL — SIGNIFICANT CHANGE UP (ref 80–100)
MONOCYTES # BLD AUTO: 0.63 K/UL — SIGNIFICANT CHANGE UP (ref 0–0.9)
MONOCYTES NFR BLD AUTO: 11.4 % — SIGNIFICANT CHANGE UP (ref 2–14)
NEUTROPHILS # BLD AUTO: 2.82 K/UL — SIGNIFICANT CHANGE UP (ref 1.8–7.4)
NEUTROPHILS NFR BLD AUTO: 50.7 % — SIGNIFICANT CHANGE UP (ref 43–77)
NRBC # BLD: 0 /100 WBCS — SIGNIFICANT CHANGE UP (ref 0–0)
PLATELET # BLD AUTO: 198 K/UL — SIGNIFICANT CHANGE UP (ref 150–400)
POTASSIUM SERPL-MCNC: 3.7 MMOL/L — SIGNIFICANT CHANGE UP (ref 3.5–5.3)
POTASSIUM SERPL-MCNC: 4.1 MMOL/L — SIGNIFICANT CHANGE UP (ref 3.5–5.3)
POTASSIUM SERPL-SCNC: 3.7 MMOL/L — SIGNIFICANT CHANGE UP (ref 3.5–5.3)
POTASSIUM SERPL-SCNC: 4.1 MMOL/L — SIGNIFICANT CHANGE UP (ref 3.5–5.3)
PROT SERPL-MCNC: 6.7 GM/DL — SIGNIFICANT CHANGE UP (ref 6–8.3)
RBC # BLD: 4.02 M/UL — SIGNIFICANT CHANGE UP (ref 3.8–5.2)
RBC # FLD: 13.6 % — SIGNIFICANT CHANGE UP (ref 10.3–14.5)
SARS-COV-2 RNA SPEC QL NAA+PROBE: DETECTED
SODIUM SERPL-SCNC: 133 MMOL/L — LOW (ref 135–145)
SODIUM SERPL-SCNC: 136 MMOL/L — SIGNIFICANT CHANGE UP (ref 135–145)
TROPONIN I, HIGH SENSITIVITY RESULT: 13.6 NG/L — SIGNIFICANT CHANGE UP
WBC # BLD: 5.55 K/UL — SIGNIFICANT CHANGE UP (ref 3.8–10.5)
WBC # FLD AUTO: 5.55 K/UL — SIGNIFICANT CHANGE UP (ref 3.8–10.5)

## 2022-07-31 PROCEDURE — 99285 EMERGENCY DEPT VISIT HI MDM: CPT

## 2022-07-31 PROCEDURE — 71045 X-RAY EXAM CHEST 1 VIEW: CPT | Mod: 26

## 2022-07-31 PROCEDURE — 93010 ELECTROCARDIOGRAM REPORT: CPT

## 2022-07-31 RX ORDER — LIDOCAINE 4 G/100G
1 CREAM TOPICAL EVERY 24 HOURS
Refills: 0 | Status: DISCONTINUED | OUTPATIENT
Start: 2022-07-31 | End: 2022-08-04

## 2022-07-31 RX ORDER — ASPIRIN/CALCIUM CARB/MAGNESIUM 324 MG
81 TABLET ORAL
Refills: 0 | Status: DISCONTINUED | OUTPATIENT
Start: 2022-07-31 | End: 2022-08-04

## 2022-07-31 RX ORDER — PIPERACILLIN AND TAZOBACTAM 4; .5 G/20ML; G/20ML
3.38 INJECTION, POWDER, LYOPHILIZED, FOR SOLUTION INTRAVENOUS EVERY 8 HOURS
Refills: 0 | Status: DISCONTINUED | OUTPATIENT
Start: 2022-07-31 | End: 2022-08-03

## 2022-07-31 RX ORDER — HYDROCORTISONE 20 MG
5 TABLET ORAL DAILY
Refills: 0 | Status: DISCONTINUED | OUTPATIENT
Start: 2022-07-31 | End: 2022-08-04

## 2022-07-31 RX ORDER — LOSARTAN POTASSIUM 100 MG/1
50 TABLET, FILM COATED ORAL DAILY
Refills: 0 | Status: DISCONTINUED | OUTPATIENT
Start: 2022-07-31 | End: 2022-08-04

## 2022-07-31 RX ORDER — LEVOTHYROXINE SODIUM 125 MCG
100 TABLET ORAL DAILY
Refills: 0 | Status: DISCONTINUED | OUTPATIENT
Start: 2022-07-31 | End: 2022-08-04

## 2022-07-31 RX ORDER — VANCOMYCIN HCL 1 G
750 VIAL (EA) INTRAVENOUS EVERY 24 HOURS
Refills: 0 | Status: DISCONTINUED | OUTPATIENT
Start: 2022-07-31 | End: 2022-08-01

## 2022-07-31 RX ORDER — PANTOPRAZOLE SODIUM 20 MG/1
40 TABLET, DELAYED RELEASE ORAL
Refills: 0 | Status: DISCONTINUED | OUTPATIENT
Start: 2022-07-31 | End: 2022-08-04

## 2022-07-31 RX ORDER — HYDROCHLOROTHIAZIDE 25 MG
12.5 TABLET ORAL DAILY
Refills: 0 | Status: DISCONTINUED | OUTPATIENT
Start: 2022-07-31 | End: 2022-08-04

## 2022-07-31 RX ORDER — POLYETHYLENE GLYCOL 3350 17 G/17G
17 POWDER, FOR SOLUTION ORAL AT BEDTIME
Refills: 0 | Status: DISCONTINUED | OUTPATIENT
Start: 2022-07-31 | End: 2022-08-04

## 2022-07-31 RX ORDER — PIPERACILLIN AND TAZOBACTAM 4; .5 G/20ML; G/20ML
3.38 INJECTION, POWDER, LYOPHILIZED, FOR SOLUTION INTRAVENOUS ONCE
Refills: 0 | Status: COMPLETED | OUTPATIENT
Start: 2022-07-31 | End: 2022-07-31

## 2022-07-31 RX ORDER — ACETAMINOPHEN 500 MG
650 TABLET ORAL ONCE
Refills: 0 | Status: COMPLETED | OUTPATIENT
Start: 2022-07-31 | End: 2022-07-31

## 2022-07-31 RX ORDER — VANCOMYCIN HCL 1 G
1000 VIAL (EA) INTRAVENOUS ONCE
Refills: 0 | Status: COMPLETED | OUTPATIENT
Start: 2022-07-31 | End: 2022-07-31

## 2022-07-31 RX ORDER — HYDROCORTISONE 20 MG
10 TABLET ORAL DAILY
Refills: 0 | Status: DISCONTINUED | OUTPATIENT
Start: 2022-07-31 | End: 2022-08-04

## 2022-07-31 RX ORDER — VANCOMYCIN HCL 1 G
1000 VIAL (EA) INTRAVENOUS EVERY 12 HOURS
Refills: 0 | Status: DISCONTINUED | OUTPATIENT
Start: 2022-07-31 | End: 2022-07-31

## 2022-07-31 RX ORDER — DIPHENHYDRAMINE HCL 50 MG
50 CAPSULE ORAL ONCE
Refills: 0 | Status: COMPLETED | OUTPATIENT
Start: 2022-07-31 | End: 2022-07-31

## 2022-07-31 RX ORDER — DIPHENHYDRAMINE HCL 50 MG
25 CAPSULE ORAL EVERY 6 HOURS
Refills: 0 | Status: DISCONTINUED | OUTPATIENT
Start: 2022-07-31 | End: 2022-08-04

## 2022-07-31 RX ORDER — ASCORBIC ACID 60 MG
500 TABLET,CHEWABLE ORAL DAILY
Refills: 0 | Status: DISCONTINUED | OUTPATIENT
Start: 2022-07-31 | End: 2022-08-04

## 2022-07-31 RX ADMIN — Medication 50 MILLIGRAM(S): at 12:26

## 2022-07-31 RX ADMIN — Medication 650 MILLIGRAM(S): at 12:30

## 2022-07-31 RX ADMIN — PIPERACILLIN AND TAZOBACTAM 25 GRAM(S): 4; .5 INJECTION, POWDER, LYOPHILIZED, FOR SOLUTION INTRAVENOUS at 21:57

## 2022-07-31 RX ADMIN — POLYETHYLENE GLYCOL 3350 17 GRAM(S): 17 POWDER, FOR SOLUTION ORAL at 21:57

## 2022-07-31 RX ADMIN — LIDOCAINE 1 PATCH: 4 CREAM TOPICAL at 22:42

## 2022-07-31 RX ADMIN — Medication 250 MILLIGRAM(S): at 12:26

## 2022-07-31 RX ADMIN — PIPERACILLIN AND TAZOBACTAM 200 GRAM(S): 4; .5 INJECTION, POWDER, LYOPHILIZED, FOR SOLUTION INTRAVENOUS at 14:13

## 2022-07-31 RX ADMIN — Medication 25 MILLIGRAM(S): at 18:01

## 2022-07-31 RX ADMIN — Medication 650 MILLIGRAM(S): at 11:22

## 2022-07-31 RX ADMIN — Medication 81 MILLIGRAM(S): at 18:01

## 2022-07-31 NOTE — H&P ADULT - HISTORY OF PRESENT ILLNESS
86 y/o F with PMHx of HLD, Cellulitis, Pre-DM, Hepatitis B, HTN, Thyroid Disease and Alessandra's Syndrome presents to the ED c/o rt leg cellulitis, discharged x1 week ago, however sx still persist after oral course of Abx Keflex, with new onset of itching that started this morning. Pt reports evaluation of cellulitis with Sono, endorses fever and rhinorrhea for x1 day since resolved, swelling, but denies hx of DM, hx of blood clots, hx of vascular issues, fever/chills, SOB or CP. Pt is not vaccinated against COVID. Patient denies EtOH/tobacco/illicit substance use.

## 2022-07-31 NOTE — ED ADULT NURSE NOTE - NSIMPLEMENTINTERV_GEN_ALL_ED
Implemented All Fall with Harm Risk Interventions:  [place] to call system. Call bell, personal items and telephone within reach. Instruct patient to call for assistance. Room bathroom lighting operational. Non-slip footwear when patient is off stretcher. Physically safe environment: no spills, clutter or unnecessary equipment. Stretcher in lowest position, wheels locked, appropriate side rails in place. Provide visual cue, wrist band, yellow gown, etc. Monitor gait and stability. Monitor for mental status changes and reorient to person, place, and time. Review medications for side effects contributing to fall risk. Reinforce activity limits and safety measures with patient and family. Provide visual clues: red socks.

## 2022-07-31 NOTE — ED ADULT NURSE REASSESSMENT NOTE - NS ED NURSE REASSESS COMMENT FT1
Pt AAox3, resting comfortably, IV ABX running, awaiting transfer to . Denies pain/discomfort. No fever/chills

## 2022-07-31 NOTE — H&P ADULT - NSHPPHYSICALEXAM_GEN_ALL_CORE
General: A/ox 3, No acute Distress  skin : Normal  Head. Andres. No lacerations  Neck: Supple, NO JVD  Cardiac: S1 S2, No M/R/G  Pulmonary: CTAP, Breathing unlabored, No Rhonchi/Rales/Wheezing  Abdomen: Soft, Non -tender, +BS x 4 quads  Neuro: A/o x 3, No focal deficits. Normal Motor and sensory exam  Vascular: Normal distal pulses.  Extremities: . No rashes. No edema. Cellulitis of right leg extending from ankle to mid calf  Decubiti ; None

## 2022-07-31 NOTE — ED PROVIDER NOTE - CLINICAL SUMMARY MEDICAL DECISION MAKING FREE TEXT BOX
Pt here for return of cellulitis discharged with oral Abx, finished course but had recurrence of symptoms. Low suspicion for Necrotizing Fascitis, likely relapse of Cellulitis. Will treat with IV Abx given PO failure, will check cellulitis labs, give Benadryl for itchiness and admit to medicine.

## 2022-07-31 NOTE — ED ADULT NURSE NOTE - OBJECTIVE STATEMENT
pt c/o right leg cellulitis started at 7/13/2022, admitted to hospital and treated, pt states cellulitis has become worse. s/p right knee sx 7/6/2022. Pt denies pain/dsicomfort at rest, on exertion pain is 5/10 in the right knee. Swelling and redeness present in right lower leg. Surgical site is dry and intact with no dressing.

## 2022-07-31 NOTE — ED ADULT NURSE REASSESSMENT NOTE - NS ED NURSE REASSESS COMMENT FT1
Pt resting comfortably. Benadryl given for itchiness. HR was 49, MD Hernandez made aware. Isolation precautions maintained

## 2022-07-31 NOTE — ED PROVIDER NOTE - PHYSICAL EXAMINATION
General: No acute distress, mentation at baseline,  well nourished, well developed  HEENT: NCAT, Neck supple without meningismus, PERRL, no conjunctival injection  Lungs: CTAB, No wheeze or crackles, No retractions, No increased work of breathing  Heart: S1S2 RRR, No M/R/G, Pules equal Bilaterally in upper and lower extremities distally  Abd: soft, NT/ND, No guarding, No rebound.  No hernias, no palpable masses.  Extrem: FROM in all joints, no gross deformities appreciated, no significant edema noted, No ulcers. Cap refil < 2sec.  Skin: No rash noted, warm dry.  Neuro:  Grossly normal.  No difficulty ambulating. No focal deficits.  Psychiatric: Appropriate mood and affect. General: No acute distress, mentation at baseline,  well nourished, well developed  HEENT: NCAT, Neck supple without meningismus, PERRL, no conjunctival injection  Lungs: CTAB, No wheeze or crackles, No retractions, No increased work of breathing  Heart: S1S2 RRR, No M/R/G, Pules equal Bilaterally in upper and lower extremities distally  Abd: soft, NT/ND, No guarding, No rebound.  No hernias, no palpable masses.  Extrem: LLE: erythema, tenderness and warmth from L ankle to  L mid shin, no purulent drainage, DP2+, sensaiton intact, moving all extremities, no crepitus  Skin: No rash noted, warm dry.  Neuro:  Grossly normal.  No difficulty ambulating. No focal deficits.  Psychiatric: Appropriate mood and affect.

## 2022-07-31 NOTE — ED PROVIDER NOTE - NS ED ROS FT
CONST: no fevers, no chills, no trauma  EYES: no pain, no visual disturbances  ENT: no sore throat, no epistaxis, no rhinorrhea, no hearing changes  CV: no chest pain, no palpitations, no orthopnea, no extremity pain or swelling  RESP: no shortness of breath, no cough, no sputum, no pleurisy, no wheezing  ABD: no abdominal pain, no nausea, no vomiting, no diarrhea, no black or bloody stool  : no dysuria, no hematuria, no frequency, no urgency  MSK: no back pain, no neck pain, no extremity pain, +right leg cellulitis  NEURO: no headache, no sensory disturbances, no focal weakness, no dizziness  HEME: no easy bleeding or bruising  SKIN: no diaphoresis, no rash

## 2022-07-31 NOTE — H&P ADULT - NSHPLABSRESULTS_GEN_ALL_CORE
11.6                 136  | 27   | 27           5.55  >-----------< 198     ------------------------< 93                    35.0                 4.1  | 102  | 1.29                                         Ca 9.2   Mg x     Ph x

## 2022-07-31 NOTE — ED ADULT TRIAGE NOTE - CHIEF COMPLAINT QUOTE
pt c/o right leg cellulitis started at 7/13/2022, admitted to hospital and treated, pt states cellulitis has become worse. s/p right knee sx 7/6/2022.

## 2022-07-31 NOTE — ED PROVIDER NOTE - OBJECTIVE STATEMENT
88 y/o F with PMHx of HLD, Cellulitis, Pre-DM, Hepatitis B, HTN, Thyroid Disease and Alessandra's Syndrome presents to the ED c/o rt leg cellulitis, discharged x1 week ago, however sx still persist after oral course of Abx Keflex, with new onset of itching that started this morning. Pt reports evaluation of cellulitis with Sono, endorses fever and rhinorrhea for x1 day since resolved, swelling, but denies hx of DM, hx of blood clots, hx of vascular issues, fever/chills, SOB or CP. Pt is not vaccinated against COVID. Patient denies EtOH/tobacco/illicit substance use. 86 y/o F with PMHx of HLD, Cellulitis, Pre-DM, Hepatitis B, HTN, Thyroid Disease and Alessandra's Syndrome presents to the ED c/o rt leg cellulitis, discharged x1 week ago, however sx still persist after oral course of Abx Keflex, with new onset of itching that started this morning. Pt reports evaluation of cellulitis with Sono, endorses fever and rhinorrhea for x1 day since resolved, swelling, but denies hx of DM, hx of blood clots, hx of vascular issues, fever/chills, SOB or CP. Pt is not vaccinated against COVID. Patient denies EtOH/tobacco/illicit substance use. Pt was given IV ancef during admission.

## 2022-08-01 LAB
APTT BLD: 27.8 SEC — SIGNIFICANT CHANGE UP (ref 27.5–35.5)
HCT VFR BLD CALC: 35.7 % — SIGNIFICANT CHANGE UP (ref 34.5–45)
HGB BLD-MCNC: 11.8 G/DL — SIGNIFICANT CHANGE UP (ref 11.5–15.5)
INR BLD: 0.97 RATIO — SIGNIFICANT CHANGE UP (ref 0.88–1.16)
MCHC RBC-ENTMCNC: 29 PG — SIGNIFICANT CHANGE UP (ref 27–34)
MCHC RBC-ENTMCNC: 33.1 G/DL — SIGNIFICANT CHANGE UP (ref 32–36)
MCV RBC AUTO: 87.7 FL — SIGNIFICANT CHANGE UP (ref 80–100)
NRBC # BLD: 0 /100 WBCS — SIGNIFICANT CHANGE UP (ref 0–0)
PLATELET # BLD AUTO: 216 K/UL — SIGNIFICANT CHANGE UP (ref 150–400)
PROTHROM AB SERPL-ACNC: 11.6 SEC — SIGNIFICANT CHANGE UP (ref 10.5–13.4)
RBC # BLD: 4.07 M/UL — SIGNIFICANT CHANGE UP (ref 3.8–5.2)
RBC # FLD: 13.6 % — SIGNIFICANT CHANGE UP (ref 10.3–14.5)
WBC # BLD: 6.11 K/UL — SIGNIFICANT CHANGE UP (ref 3.8–10.5)
WBC # FLD AUTO: 6.11 K/UL — SIGNIFICANT CHANGE UP (ref 3.8–10.5)

## 2022-08-01 PROCEDURE — 99222 1ST HOSP IP/OBS MODERATE 55: CPT

## 2022-08-01 RX ORDER — ONDANSETRON 8 MG/1
4 TABLET, FILM COATED ORAL ONCE
Refills: 0 | Status: COMPLETED | OUTPATIENT
Start: 2022-08-01 | End: 2022-08-01

## 2022-08-01 RX ORDER — TRAMADOL HYDROCHLORIDE 50 MG/1
25 TABLET ORAL ONCE
Refills: 0 | Status: DISCONTINUED | OUTPATIENT
Start: 2022-08-01 | End: 2022-08-01

## 2022-08-01 RX ORDER — ACETAMINOPHEN 500 MG
650 TABLET ORAL EVERY 6 HOURS
Refills: 0 | Status: DISCONTINUED | OUTPATIENT
Start: 2022-08-01 | End: 2022-08-04

## 2022-08-01 RX ORDER — HEPARIN SODIUM 5000 [USP'U]/ML
5000 INJECTION INTRAVENOUS; SUBCUTANEOUS EVERY 8 HOURS
Refills: 0 | Status: DISCONTINUED | OUTPATIENT
Start: 2022-08-01 | End: 2022-08-04

## 2022-08-01 RX ADMIN — LIDOCAINE 1 PATCH: 4 CREAM TOPICAL at 11:51

## 2022-08-01 RX ADMIN — Medication 10 MILLIGRAM(S): at 06:10

## 2022-08-01 RX ADMIN — Medication 650 MILLIGRAM(S): at 18:20

## 2022-08-01 RX ADMIN — PIPERACILLIN AND TAZOBACTAM 25 GRAM(S): 4; .5 INJECTION, POWDER, LYOPHILIZED, FOR SOLUTION INTRAVENOUS at 06:11

## 2022-08-01 RX ADMIN — Medication 100 MICROGRAM(S): at 06:10

## 2022-08-01 RX ADMIN — Medication 81 MILLIGRAM(S): at 06:10

## 2022-08-01 RX ADMIN — LIDOCAINE 1 PATCH: 4 CREAM TOPICAL at 22:00

## 2022-08-01 RX ADMIN — TRAMADOL HYDROCHLORIDE 25 MILLIGRAM(S): 50 TABLET ORAL at 01:30

## 2022-08-01 RX ADMIN — LIDOCAINE 1 PATCH: 4 CREAM TOPICAL at 08:55

## 2022-08-01 RX ADMIN — Medication 250 MILLIGRAM(S): at 12:18

## 2022-08-01 RX ADMIN — HEPARIN SODIUM 5000 UNIT(S): 5000 INJECTION INTRAVENOUS; SUBCUTANEOUS at 14:51

## 2022-08-01 RX ADMIN — HEPARIN SODIUM 5000 UNIT(S): 5000 INJECTION INTRAVENOUS; SUBCUTANEOUS at 21:27

## 2022-08-01 RX ADMIN — Medication 5 MILLIGRAM(S): at 18:19

## 2022-08-01 RX ADMIN — Medication 500 MILLIGRAM(S): at 11:53

## 2022-08-01 RX ADMIN — LOSARTAN POTASSIUM 50 MILLIGRAM(S): 100 TABLET, FILM COATED ORAL at 12:19

## 2022-08-01 RX ADMIN — PIPERACILLIN AND TAZOBACTAM 25 GRAM(S): 4; .5 INJECTION, POWDER, LYOPHILIZED, FOR SOLUTION INTRAVENOUS at 14:53

## 2022-08-01 RX ADMIN — PANTOPRAZOLE SODIUM 40 MILLIGRAM(S): 20 TABLET, DELAYED RELEASE ORAL at 06:13

## 2022-08-01 RX ADMIN — Medication 650 MILLIGRAM(S): at 19:20

## 2022-08-01 RX ADMIN — ONDANSETRON 4 MILLIGRAM(S): 8 TABLET, FILM COATED ORAL at 23:14

## 2022-08-01 RX ADMIN — Medication 25 MILLIGRAM(S): at 06:59

## 2022-08-01 RX ADMIN — TRAMADOL HYDROCHLORIDE 25 MILLIGRAM(S): 50 TABLET ORAL at 00:43

## 2022-08-01 RX ADMIN — Medication 1 TABLET(S): at 11:53

## 2022-08-01 RX ADMIN — Medication 81 MILLIGRAM(S): at 17:46

## 2022-08-01 RX ADMIN — Medication 12.5 MILLIGRAM(S): at 12:19

## 2022-08-01 RX ADMIN — Medication 25 MILLIGRAM(S): at 00:53

## 2022-08-01 RX ADMIN — PIPERACILLIN AND TAZOBACTAM 25 GRAM(S): 4; .5 INJECTION, POWDER, LYOPHILIZED, FOR SOLUTION INTRAVENOUS at 21:28

## 2022-08-01 NOTE — PROGRESS NOTE ADULT - ASSESSMENT
cellulitis rt leg   s/p recent rt TKR   matthias's syndrome   Asymptomatic covid, elevated  d Dimer- on [id number]

## 2022-08-01 NOTE — CONSULT NOTE ADULT - SUBJECTIVE AND OBJECTIVE BOX
[facility]  INFECTIOUS DISEASES   [address] [place] [address]  Tel: [phone]     Fax: [phone]  ======================================================  John Dodson,MD David, DO Char Amaya, ROCKY   ======================================================    PARISH WARD  MRN-[id number]        Patient is a 87y old  Female who presents with a chief complaint of cellulitis rt leg/ covid 19/ HTn/ matthias's syndrome (01 Aug 2022 11:29)      HPI:  88 y/o F with PMHx of HLD, Cellulitis, Pre-DM, Hepatitis B, HTN, Thyroid Disease and Matthias's Syndrome presents to the ED c/o rt leg cellulitis, discharged x1 week ago, however sx still persist after oral course of Abx Keflex, with new onset of itching that started this morning. Pt reports evaluation of cellulitis with Sono, endorses fever and rhinorrhea for x1 day since resolved, swelling, but denies hx of DM, hx of blood clots, hx of vascular issues, fever/chills, SOB or CP. Pt is not vaccinated against COVID. Patient denies EtOH/tobacco/illicit substance use. (2022 14:04)      ID consulted for workup and antibiotic management     PAST MEDICAL & SURGICAL HISTORY:  Sheehans syndrome      Thyroid disease      HTN (hypertension)      History of hepatitis B      Prediabetes      HLD (hyperlipidemia)      H/O: hysterectomy      H/O:   x4      History of excision of pilonidal cyst      H/O intestinal obstruction      S/P total knee replacement, right  2022      History of cataract surgery  B/l eyes          Allergies  codeine (Headache)  doxycycline (Hives)  scallops (Nausea)  shellfish (Nausea)        ANTIMICROBIALS:  piperacillin/tazobactam IVPB.. 3.375 every 8 hours      MEDICATIONS  (STANDING):  piperacillin/tazobactam IVPB..   25 mL/Hr IV Intermittent (22 @ 14:53)   25 mL/Hr IV Intermittent (22 @ 06:11)   25 mL/Hr IV Intermittent (22 @ 21:57)    piperacillin/tazobactam IVPB...   200 mL/Hr IV Intermittent (22 @ 14:13)    vancomycin  IVPB   250 mL/Hr IV Intermittent (22 @ 12:18)    vancomycin  IVPB.   250 mL/Hr IV Intermittent (22 @ 12:26)        OTHER MEDS: MEDICATIONS  (STANDING):  aspirin  chewable 81 two times a day  diphenhydrAMINE 25 every 6 hours PRN  heparin   Injectable 5000 every 8 hours  hydrochlorothiazide 12.5 daily  hydrocortisone 10 daily  hydrocortisone 5 daily  levothyroxine 100 daily  losartan 50 daily  pantoprazole    Tablet 40 before breakfast  polyethylene glycol 3350 17 at bedtime      SOCIAL HISTORY:     denies toxic habits   FAMILY HISTORY:  FH: HTN (hypertension)        REVIEW OF SYSTEMS  [  ] ROS unobtainable because:    [ X ] All other systems negative except as noted below:	    Constitutional:  [ ] fever [ ] chills  [ ] weight loss  [ ] weakness  Skin:  [X ] rash [ ] phlebitis	  Eyes: [ ] icterus [ ] pain  [ ] discharge	  ENMT: [ ] sore throat  [ ] thrush [ ] ulcers [ ] exudates  Respiratory: [ ] dyspnea [ ] hemoptysis [ ] cough [ ] sputum	  Cardiovascular:  [ ] chest pain [ ] palpitations [ ] edema	  Gastrointestinal:  [ ] nausea [ ] vomiting [ ] diarrhea [ ] constipation [ ] pain	  Genitourinary:  [ ] dysuria [ ] frequency [ ] hematuria [ ] discharge [ ] flank pain  [ ] incontinence  Musculoskeletal:  [ ] myalgias [ ] arthralgias [ ] arthritis  [ ] back pain  Neurological:  [ ] headache [ ] seizures  [ ] confusion/altered mental status  Psychiatric:  [ ] anxiety [ ] depression	  Hematology/Lymphatics:  [ ] lymphadenopathy  Endocrine:  [ ] adrenal [ ] thyroid  Allergic/Immunologic:	 [ ] transplant [ ] seasonal    Vital Signs Last 24 Hrs  T(F): 97.4 (22 @ 11:31), Max: 98.2 (22 @ 10:20)    Vital Signs Last 24 Hrs  HR: 51 (22 @ 14:06) (48 - 71)  BP: 153/57 (22 @ 14:06) (111/63 - 166/73)  RR: 18 (22 @ 11:31)  SpO2: 96% (22 @ 14:06) (96% - 98%)  Wt(kg): --    PHYSICAL EXAM:  Constitutional: non-toxic, no distress  HEAD/EYES: anicteric, no conjunctival injection  ENT:  supple, no thrush  Cardiovascular:   normal S1, S2, no murmur, no edema  Respiratory:  clear BS bilaterally, no wheezes, no rales  GI:  soft, non-tender, normal bowel sounds  :  no phelan, no CVA tenderness  Musculoskeletal:  no synovitis, normal ROM  Neurologic: awake and alert, normal strength, no focal findings  Skin:  no rash, no erythema, no phlebitis  Heme/Onc: no lymphadenopathy   Psychiatric:  awake, alert, appropriate mood      WBC Count: 6.11 K/uL ( @ 07:30)  WBC Count: 5.55 K/uL ( @ 12:00)      Auto Neutrophil %: 50.7 % (22 @ 12:00)  Auto Neutrophil #: 2.82 K/uL (22 @ 12:00)                            11.8   6.11  )-----------( 216      ( 01 Aug 2022 07:30 )             35.7           133<L>  |  99  |  27<H>  ----------------------------<  98  3.7   |  27  |  1.52<H>    Ca    8.9      2022 15:45    TPro  6.7  /  Alb  3.4  /  TBili  0.4  /  DBili  0.1  /  AST  28  /  ALT  22  /  AlkPhos  80        Creatinine Trend: 1.52<--, 1.29<--, 1.33<--, 1.48<--, 1.14<--, 1.18<--        Lactate, Blood: 1.2 mmol/L (22 @ 12:00)      MICROBIOLOGY:  .Blood Blood  22   No Growth Final  --  --    C-Reactive Protein, Serum: 9 ()  C-Reactive Protein, Serum: 8 ()  C-Reactive Protein, Serum: 24 ()    Ferritin, Serum: 387 ()      D-Dimer Assay, Quantitative: 4 ()      SARS-CoV-2 Result: Detected (22 @ 12:00)      RADIOLOGY:  < from: Xray Chest 1 View-PORTABLE IMMEDIATE (Xray Chest 1 View-PORTABLE IMMEDIATE .) (22 @ 14:43) >    IMPRESSION: Clear lungs with no acute cardiopulmonary abnormalities.     [facility]  INFECTIOUS DISEASES   [address] [place] [address]  Tel: [phone]     Fax: [phone]  ======================================================  John Dodson,, MD David Caruso, DO Char Amaya, ROCKY   ======================================================    PARISH WADR  MRN-[id number]        Patient is a 87y old  Female who presents with a chief complaint of cellulitis rt leg/ covid 19/ HTn/ matthias's syndrome (01 Aug 2022 11:29)      HPI:  86 y/o F with PMHx of HLD, Cellulitis, Pre-DM, Hepatitis B, HTN, Thyroid Disease and Matthias's Syndrome presents to the ED c/o rt leg cellulitis, discharged x1 week ago, however sx still persist after oral course of Abx Keflex, with new onset of itching that started this morning. Pt reports evaluation of cellulitis with Sono, endorses fever and rhinorrhea for x1 day since resolved, swelling, but denies hx of DM, hx of blood clots, hx of vascular issues, fever/chills, SOB or CP. Pt is not vaccinated against COVID. Patient denies EtOH/tobacco/illicit substance use. (2022 14:04)    agree with above. No fever or chills. erythema came back 2 days of stopping keflex.   ID consulted for workup and antibiotic management     PAST MEDICAL & SURGICAL HISTORY:  Sheehans syndrome      Thyroid disease      HTN (hypertension)      History of hepatitis B      Prediabetes      HLD (hyperlipidemia)      H/O: hysterectomy      H/O:   x4      History of excision of pilonidal cyst      H/O intestinal obstruction      S/P total knee replacement, right  2022      History of cataract surgery  B/l eyes          Allergies  codeine (Headache)  doxycycline (Hives)  scallops (Nausea)  shellfish (Nausea)        ANTIMICROBIALS:  piperacillin/tazobactam IVPB.. 3.375 every 8 hours      MEDICATIONS  (STANDING):  piperacillin/tazobactam IVPB..   25 mL/Hr IV Intermittent (22 @ 14:53)   25 mL/Hr IV Intermittent (22 @ 06:11)   25 mL/Hr IV Intermittent (22 @ 21:57)    piperacillin/tazobactam IVPB...   200 mL/Hr IV Intermittent (22 @ 14:13)    vancomycin  IVPB   250 mL/Hr IV Intermittent (22 @ 12:18)    vancomycin  IVPB.   250 mL/Hr IV Intermittent (22 @ 12:26)        OTHER MEDS: MEDICATIONS  (STANDING):  aspirin  chewable 81 two times a day  diphenhydrAMINE 25 every 6 hours PRN  heparin   Injectable 5000 every 8 hours  hydrochlorothiazide 12.5 daily  hydrocortisone 10 daily  hydrocortisone 5 daily  levothyroxine 100 daily  losartan 50 daily  pantoprazole    Tablet 40 before breakfast  polyethylene glycol 3350 17 at bedtime      SOCIAL HISTORY:     denies toxic habits   FAMILY HISTORY:  FH: HTN (hypertension)        REVIEW OF SYSTEMS  [  ] ROS unobtainable because:    [ X ] All other systems negative except as noted below:	    Constitutional:  [ ] fever [ ] chills  [ ] weight loss  [ ] weakness  Skin:  [X ] rash [ ] phlebitis	  Eyes: [ ] icterus [ ] pain  [ ] discharge	  ENMT: [ ] sore throat  [ ] thrush [ ] ulcers [ ] exudates  Respiratory: [ ] dyspnea [ ] hemoptysis [ ] cough [ ] sputum	  Cardiovascular:  [ ] chest pain [ ] palpitations [ ] edema	  Gastrointestinal:  [ ] nausea [ ] vomiting [ ] diarrhea [ ] constipation [ ] pain	  Genitourinary:  [ ] dysuria [ ] frequency [ ] hematuria [ ] discharge [ ] flank pain  [ ] incontinence  Musculoskeletal:  [ ] myalgias [ ] arthralgias [ ] arthritis  [ ] back pain  Neurological:  [ ] headache [ ] seizures  [ ] confusion/altered mental status  Psychiatric:  [ ] anxiety [ ] depression	  Hematology/Lymphatics:  [ ] lymphadenopathy  Endocrine:  [ ] adrenal [ ] thyroid  Allergic/Immunologic:	 [ ] transplant [ ] seasonal    Vital Signs Last 24 Hrs  T(F): 97.4 (22 @ 11:31), Max: 98.2 (22 @ 10:20)    Vital Signs Last 24 Hrs  HR: 51 (22 @ 14:06) (48 - 71)  BP: 153/57 (22 @ 14:06) (111/63 - 166/73)  RR: 18 (22 @ 11:31)  SpO2: 96% (22 @ 14:06) (96% - 98%)  Wt(kg): --    PHYSICAL EXAM:  Constitutional: non-toxic, no distress  HEAD/EYES: anicteric, no conjunctival injection  ENT:  supple, no thrush  Cardiovascular:   normal S1, S2, no murmur, no edema  Respiratory:  clear BS bilaterally, no wheezes, no rales  GI:  soft, non-tender, normal bowel sounds  :  no phelan, no CVA tenderness  Musculoskeletal:  no synovitis, normal ROM  Neurologic: awake and alert, normal strength, no focal findings  Skin:  s/p right knee surgery with healing surgical site with mild erythema near the suture line, there is increased calor and erythema to the RLE from the ankle to midcalf  Heme/Onc: no lymphadenopathy   Psychiatric:  awake, alert, appropriate mood      WBC Count: 6.11 K/uL ( @ 07:30)  WBC Count: 5.55 K/uL ( @ 12:00)      Auto Neutrophil %: 50.7 % (22 @ 12:00)  Auto Neutrophil #: 2.82 K/uL (22 @ 12:00)                            11.8   6.11  )-----------( 216      ( 01 Aug 2022 07:30 )             35.7           133<L>  |  99  |  27<H>  ----------------------------<  98  3.7   |  27  |  1.52<H>    Ca    8.9      2022 15:45    TPro  6.7  /  Alb  3.4  /  TBili  0.4  /  DBili  0.1  /  AST  28  /  ALT  22  /  AlkPhos  80        Creatinine Trend: 1.52<--, 1.29<--, 1.33<--, 1.48<--, 1.14<--, 1.18<--        Lactate, Blood: 1.2 mmol/L (22 @ 12:00)      MICROBIOLOGY:  .Blood Blood  22   No Growth Final  --  --    C-Reactive Protein, Serum: 9 ()  C-Reactive Protein, Serum: 8 ()  C-Reactive Protein, Serum: 24 ()    Ferritin, Serum: 387 ()      D-Dimer Assay, Quantitative: 2034 ()      SARS-CoV-2 Result: Detected (22 @ 12:00)      RADIOLOGY:  < from: Xray Chest 1 View-PORTABLE IMMEDIATE (Xray Chest 1 View-PORTABLE IMMEDIATE .) (22 @ 14:43) >    IMPRESSION: Clear lungs with no acute cardiopulmonary abnormalities.

## 2022-08-01 NOTE — PHYSICAL THERAPY INITIAL EVALUATION ADULT - LEVEL OF INDEPENDENCE: STAND/SIT, REHAB EVAL
supervision 65 y/o F with PMH of pulmonary HTN [place] train.  Pt well appearing, VSS, NAD.  No localized weakness.  Labs wnl, CT head negative.  Pt reexamined and symptoms had not improved with rest and IVFs.  CTA head and neck ordered and treated for vertigo with resolution of symptoms.  Pt discharged with Meclizine Rx and advised to f/u with EnT and neurology.  Return precautions advised.

## 2022-08-01 NOTE — PHYSICAL THERAPY INITIAL EVALUATION ADULT - TRANSFER TRAINING, PT EVAL
Patient will perform all aspects of transfers independently with straight cane in 2-3 days in order to change positions throughout the day and navigate the home environment.

## 2022-08-01 NOTE — CONSULT NOTE ADULT - ASSESSMENT
stop vancomycin  continue zosyn  US doppler to rule out DVT   Leg elevation   ACE wrap to the leg as well  if spikes fever, please send 2 sets of blood cultures     David Caruso, DO  Infectious Disease Attending  Reachable via Microsoft Teams or ID office: [phone]  After 5pm/weekends please call [phone] for all inquiries and new consults  88 y/o F with PMHx of HLD, Cellulitis, Pre-DM, Hepatitis B, HTN, Thyroid Disease and Alessandra's Syndrome presents to the ED c/o rt leg cellulitis, discharged x1 week ago, however sx still persist after oral course of Abx Keflex, with new onset of itching that started this morning. Pt reports evaluation of cellulitis with Sono, endorses fever and rhinorrhea for x1 day since resolved, swelling, but denies hx of DM, hx of blood clots, hx of vascular issues, fever/chills, SOB or CP. Pt is not vaccinated against COVID.  Tested positive for covid but reports one day of cold like symptoms which have since resolved   never had a fever   vital signs stable  normal inflammatory markers and wbc   give some improvement last time she had antibiotics and there is increased calor and erythema would again treat for cellulitis. This is nonpurulent cellulitis and low suscpicion for MRSA infection and thus have stopped vancomycin. For now can continue Zosyn    Plan:  stop vancomycin  continue zosyn  US doppler to rule out DVT   Leg elevation   ACE wrap to the leg as well  if spikes fever, please send 2 sets of blood cultures   Patient has hx of HBV but not on any medication(?), perhaps natural clearance, will send serologies   patient on solucortef,, alessandra syndrome, if wbc rises could be medication induced vs infection->will monitor closely   send HBcAb, HBsAg, HBsAb     Discussed with primary team, hospitalist     David Caurso, DO  Infectious Disease Attending  Reachable via Microsoft Teams or ID office: [phone]  After 5pm/weekends please call [phone] for all inquiries and new consults

## 2022-08-01 NOTE — PATIENT PROFILE ADULT - FALL HARM RISK - HARM RISK INTERVENTIONS

## 2022-08-01 NOTE — PATIENT PROFILE ADULT - ARE SIGNIFICANT INDICATORS COMPLETE.
Pt comes in tonight with complaints of urinating blood and some blood clots. Pt is post TURP x1 week. Pt states the blood started today and once he had clots. Pt started back on his Plavix on Saturday.    Yes

## 2022-08-01 NOTE — PHYSICAL THERAPY INITIAL EVALUATION ADULT - GENERAL OBSERVATIONS, REHAB EVAL
Chart reviewed and events noted to date. Patient received semisupine in bed, AxOx4, NAD. +heplock. Denies any pain or discomfort at this time. Patient agreeable to PT session.

## 2022-08-01 NOTE — PHYSICAL THERAPY INITIAL EVALUATION ADULT - DISCHARGE DISPOSITION, PT EVAL
Pt endorses she will be able to arrange transportation to and from outpatient PT appointments if unable to drive herself/home w/ outpatient services

## 2022-08-01 NOTE — PHYSICAL THERAPY INITIAL EVALUATION ADULT - PERTINENT HX OF CURRENT PROBLEM, REHAB EVAL
Pt is an 87 year old Female admitted for worsening RLE cellulitis. Pt is s/p R TKA on 7/6, was receiving Home PT prior to admission on 7/20 for RLE cellulitis. Pt was discharged from hospital on 7/22, then re-admitted 7/31 for worsening RLE cellulitis symptoms. Pt admitted for cellulitis and COVID-19 infection.

## 2022-08-01 NOTE — PHYSICAL THERAPY INITIAL EVALUATION ADULT - ADDITIONAL COMMENTS
Per pt, pt lives alone in senior living apartment with 1 step to enter with R sided handrail, all amenities on 1 level inside apartment. Pt was receiving outpatient physical therapy services prior to admission on 7/31. Pt has a walk-in shower stall, comfort height toilet seat, and has grab bars for shower and toilet. Pt endorses that she has 2 daughters that live nearby and neighbors that can help her get to her outpatient physical therapy appointments if she cannot drive due to RLE cellulitis. Prior to admission, pt was performing all ADLs and ambulation independently with straight cane. Pt owns a rolling walker at home in good, working condition from recent R TKA.

## 2022-08-01 NOTE — PHYSICAL THERAPY INITIAL EVALUATION ADULT - GAIT TRAINING, PT EVAL
Patient will ambulate 50+ feet with AD independently in 2-3 days in order to prepare for safe home ambulation.

## 2022-08-02 ENCOUNTER — APPOINTMENT (OUTPATIENT)
Dept: ORTHOPEDIC SURGERY | Facility: CLINIC | Age: 87
End: 2022-08-02

## 2022-08-02 LAB
ANION GAP SERPL CALC-SCNC: 5 MMOL/L — SIGNIFICANT CHANGE UP (ref 5–17)
BUN SERPL-MCNC: 18 MG/DL — SIGNIFICANT CHANGE UP (ref 7–23)
CALCIUM SERPL-MCNC: 9.3 MG/DL — SIGNIFICANT CHANGE UP (ref 8.5–10.1)
CHLORIDE SERPL-SCNC: 103 MMOL/L — SIGNIFICANT CHANGE UP (ref 96–108)
CO2 SERPL-SCNC: 29 MMOL/L — SIGNIFICANT CHANGE UP (ref 22–31)
CREAT SERPL-MCNC: 1.3 MG/DL — SIGNIFICANT CHANGE UP (ref 0.5–1.3)
CRP SERPL-MCNC: 7 MG/L — HIGH
EGFR: 40 ML/MIN/1.73M2 — LOW
ERYTHROCYTE [SEDIMENTATION RATE] IN BLOOD: 19 MM/HR — SIGNIFICANT CHANGE UP (ref 0–20)
GLUCOSE SERPL-MCNC: 96 MG/DL — SIGNIFICANT CHANGE UP (ref 70–99)
HBV CORE AB SER-ACNC: REACTIVE
HBV SURFACE AB SER-ACNC: REACTIVE
HBV SURFACE AG SER-ACNC: SIGNIFICANT CHANGE UP
HCT VFR BLD CALC: 33.8 % — LOW (ref 34.5–45)
HGB BLD-MCNC: 11.1 G/DL — LOW (ref 11.5–15.5)
MCHC RBC-ENTMCNC: 28.7 PG — SIGNIFICANT CHANGE UP (ref 27–34)
MCHC RBC-ENTMCNC: 32.8 G/DL — SIGNIFICANT CHANGE UP (ref 32–36)
MCV RBC AUTO: 87.3 FL — SIGNIFICANT CHANGE UP (ref 80–100)
NRBC # BLD: 0 /100 WBCS — SIGNIFICANT CHANGE UP (ref 0–0)
PLATELET # BLD AUTO: 186 K/UL — SIGNIFICANT CHANGE UP (ref 150–400)
POTASSIUM SERPL-MCNC: 3.6 MMOL/L — SIGNIFICANT CHANGE UP (ref 3.5–5.3)
POTASSIUM SERPL-SCNC: 3.6 MMOL/L — SIGNIFICANT CHANGE UP (ref 3.5–5.3)
RBC # BLD: 3.87 M/UL — SIGNIFICANT CHANGE UP (ref 3.8–5.2)
RBC # FLD: 13.7 % — SIGNIFICANT CHANGE UP (ref 10.3–14.5)
SODIUM SERPL-SCNC: 137 MMOL/L — SIGNIFICANT CHANGE UP (ref 135–145)
WBC # BLD: 5.61 K/UL — SIGNIFICANT CHANGE UP (ref 3.8–10.5)
WBC # FLD AUTO: 5.61 K/UL — SIGNIFICANT CHANGE UP (ref 3.8–10.5)

## 2022-08-02 PROCEDURE — 99232 SBSQ HOSP IP/OBS MODERATE 35: CPT

## 2022-08-02 RX ADMIN — HEPARIN SODIUM 5000 UNIT(S): 5000 INJECTION INTRAVENOUS; SUBCUTANEOUS at 21:24

## 2022-08-02 RX ADMIN — Medication 12.5 MILLIGRAM(S): at 05:31

## 2022-08-02 RX ADMIN — Medication 100 MICROGRAM(S): at 05:31

## 2022-08-02 RX ADMIN — PIPERACILLIN AND TAZOBACTAM 25 GRAM(S): 4; .5 INJECTION, POWDER, LYOPHILIZED, FOR SOLUTION INTRAVENOUS at 14:18

## 2022-08-02 RX ADMIN — LIDOCAINE 1 PATCH: 4 CREAM TOPICAL at 22:05

## 2022-08-02 RX ADMIN — Medication 25 MILLIGRAM(S): at 22:24

## 2022-08-02 RX ADMIN — Medication 81 MILLIGRAM(S): at 05:31

## 2022-08-02 RX ADMIN — LOSARTAN POTASSIUM 50 MILLIGRAM(S): 100 TABLET, FILM COATED ORAL at 05:32

## 2022-08-02 RX ADMIN — Medication 10 MILLIGRAM(S): at 05:31

## 2022-08-02 RX ADMIN — HEPARIN SODIUM 5000 UNIT(S): 5000 INJECTION INTRAVENOUS; SUBCUTANEOUS at 05:32

## 2022-08-02 RX ADMIN — Medication 500 MILLIGRAM(S): at 12:02

## 2022-08-02 RX ADMIN — LIDOCAINE 1 PATCH: 4 CREAM TOPICAL at 12:00

## 2022-08-02 RX ADMIN — LIDOCAINE 1 PATCH: 4 CREAM TOPICAL at 07:00

## 2022-08-02 RX ADMIN — PIPERACILLIN AND TAZOBACTAM 25 GRAM(S): 4; .5 INJECTION, POWDER, LYOPHILIZED, FOR SOLUTION INTRAVENOUS at 05:30

## 2022-08-02 RX ADMIN — HEPARIN SODIUM 5000 UNIT(S): 5000 INJECTION INTRAVENOUS; SUBCUTANEOUS at 14:16

## 2022-08-02 RX ADMIN — PIPERACILLIN AND TAZOBACTAM 25 GRAM(S): 4; .5 INJECTION, POWDER, LYOPHILIZED, FOR SOLUTION INTRAVENOUS at 21:24

## 2022-08-02 RX ADMIN — Medication 1 APPLICATION(S): at 18:41

## 2022-08-02 RX ADMIN — POLYETHYLENE GLYCOL 3350 17 GRAM(S): 17 POWDER, FOR SOLUTION ORAL at 21:24

## 2022-08-02 RX ADMIN — Medication 1 TABLET(S): at 12:01

## 2022-08-02 RX ADMIN — Medication 25 MILLIGRAM(S): at 03:41

## 2022-08-02 RX ADMIN — Medication 25 MILLIGRAM(S): at 12:48

## 2022-08-02 RX ADMIN — PANTOPRAZOLE SODIUM 40 MILLIGRAM(S): 20 TABLET, DELAYED RELEASE ORAL at 05:32

## 2022-08-02 RX ADMIN — Medication 5 MILLIGRAM(S): at 18:42

## 2022-08-02 NOTE — PROGRESS NOTE ADULT - NS ATTEND AMEND GEN_ALL_CORE FT
exam improving, continue zosyn   follow cultures  leg elevation  topical steroid to help alleviate the itchiness   DVT study pending  no role for monoclonal antibody in asymptomatic covid patient     David Caruso, DO  Infectious Disease Attending  Reachable via Microsoft Teams or ID office: [phone]  After 5pm/weekends please call [phone] for all inquiries and new consults

## 2022-08-02 NOTE — PROGRESS NOTE ADULT - ASSESSMENT
88 y/o F with PMHx of HLD, Cellulitis, Pre-DM, Hepatitis B, HTN, Thyroid Disease and Alessandra's Syndrome presents to the ED c/o rt leg cellulitis, discharged x1 week ago, however sx still persist after oral course of Abx Keflex, with new onset of itching that started this morning. Pt reports evaluation of cellulitis with Sono, endorses fever and rhinorrhea for x1 day since resolved, swelling, but denies hx of DM, hx of blood clots, hx of vascular issues, fever/chills, SOB or CP. Pt is not vaccinated against COVID.  Tested positive for covid but reports one day of cold like symptoms which have since resolved   never had a fever   vital signs stable  normal inflammatory markers and wbc   give some improvement last time she had antibiotics and there is increased calor and erythema would again treat for cellulitis. This is nonpurulent cellulitis and low suscpicion for MRSA infection and thus have stopped vancomycin. For now can continue Zosyn    8/2: afebrile, on RA, no WBC, Cr normalized, BCs with no growth to date, cellulitis is improving, + pruritus reported by the pt, triamcinolone ointment was ordered by the attending, Zosyn iv continued.     Plan:  continue zosyn  triamcinolone ointment   US doppler to rule out DVT pending   Leg elevation   ACE wrap to the leg as well  if spikes fever, please send 2 sets of blood cultures   Patient has hx of HBV but not on any medication(?), perhaps natural clearance, will send serologies   patient on solucortef,, alessandra syndrome, if wbc rises could be medication induced vs infection->will monitor closely   HBcAb reactive, HBsAg nonreactive, HBsAb reactive  pt's covid 19 is asymptomatic - no role for treatment     discussed with Dr. Caruso 88 y/o F with PMHx of HLD, Cellulitis, Pre-DM, Hepatitis B, HTN, Thyroid Disease and Alessandra's Syndrome presents to the ED c/o rt leg cellulitis, discharged x1 week ago, however sx still persist after oral course of Abx Keflex, with new onset of itching that started this morning. Pt reports evaluation of cellulitis with Sono, endorses fever and rhinorrhea for x1 day since resolved, swelling, but denies hx of DM, hx of blood clots, hx of vascular issues, fever/chills, SOB or CP. Pt is not vaccinated against COVID.  Tested positive for covid but reports one day of cold like symptoms which have since resolved   never had a fever   vital signs stable  normal inflammatory markers and wbc   give some improvement last time she had antibiotics and there is increased calor and erythema would again treat for cellulitis. This is nonpurulent cellulitis and low suscpicion for MRSA infection and thus have stopped vancomycin. For now can continue Zosyn    8/2: afebrile, on RA, no WBC, Cr normalized, BCs with no growth to date, cellulitis is improving, + pruritus reported by the pt, triamcinolone ointment was ordered by the attending, Zosyn iv continued.     Plan:  continue zosyn  triamcinolone ointment to her RLE  US doppler to rule out DVT pending   Leg elevation   ACE wrap to the leg as well  if spikes fever, please send 2 sets of blood cultures    patient on solucortef,, alessandra syndrome, if wbc rises could be medication induced vs infection->will monitor closely   HBcAb reactive, HBsAg nonreactive, HBsAb reactive, old disease with clearance, nothing to do   pt's covid 19 is asymptomatic - no role for treatment     discussed with Dr. Caruso

## 2022-08-02 NOTE — PROGRESS NOTE ADULT - ASSESSMENT
cellulitis rt leg   s/p recent rt TKR   matthias's syndrome   Asymptomatic covid, elevated  d Dimer- on [id number]

## 2022-08-03 PROCEDURE — 99232 SBSQ HOSP IP/OBS MODERATE 35: CPT

## 2022-08-03 PROCEDURE — 93970 EXTREMITY STUDY: CPT | Mod: 26

## 2022-08-03 RX ADMIN — LOSARTAN POTASSIUM 50 MILLIGRAM(S): 100 TABLET, FILM COATED ORAL at 06:20

## 2022-08-03 RX ADMIN — Medication 650 MILLIGRAM(S): at 01:32

## 2022-08-03 RX ADMIN — Medication 100 MICROGRAM(S): at 06:20

## 2022-08-03 RX ADMIN — Medication 81 MILLIGRAM(S): at 17:42

## 2022-08-03 RX ADMIN — HEPARIN SODIUM 5000 UNIT(S): 5000 INJECTION INTRAVENOUS; SUBCUTANEOUS at 06:20

## 2022-08-03 RX ADMIN — PIPERACILLIN AND TAZOBACTAM 25 GRAM(S): 4; .5 INJECTION, POWDER, LYOPHILIZED, FOR SOLUTION INTRAVENOUS at 15:00

## 2022-08-03 RX ADMIN — Medication 1 APPLICATION(S): at 17:42

## 2022-08-03 RX ADMIN — HEPARIN SODIUM 5000 UNIT(S): 5000 INJECTION INTRAVENOUS; SUBCUTANEOUS at 15:00

## 2022-08-03 RX ADMIN — Medication 650 MILLIGRAM(S): at 02:02

## 2022-08-03 RX ADMIN — PANTOPRAZOLE SODIUM 40 MILLIGRAM(S): 20 TABLET, DELAYED RELEASE ORAL at 06:20

## 2022-08-03 RX ADMIN — Medication 500 MILLIGRAM(S): at 15:01

## 2022-08-03 RX ADMIN — Medication 1 APPLICATION(S): at 06:21

## 2022-08-03 RX ADMIN — LIDOCAINE 1 PATCH: 4 CREAM TOPICAL at 14:37

## 2022-08-03 RX ADMIN — Medication 81 MILLIGRAM(S): at 06:19

## 2022-08-03 RX ADMIN — Medication 5 MILLIGRAM(S): at 17:42

## 2022-08-03 RX ADMIN — POLYETHYLENE GLYCOL 3350 17 GRAM(S): 17 POWDER, FOR SOLUTION ORAL at 21:48

## 2022-08-03 RX ADMIN — PIPERACILLIN AND TAZOBACTAM 25 GRAM(S): 4; .5 INJECTION, POWDER, LYOPHILIZED, FOR SOLUTION INTRAVENOUS at 06:20

## 2022-08-03 RX ADMIN — LIDOCAINE 1 PATCH: 4 CREAM TOPICAL at 07:33

## 2022-08-03 RX ADMIN — Medication 1 TABLET(S): at 21:48

## 2022-08-03 RX ADMIN — Medication 12.5 MILLIGRAM(S): at 06:20

## 2022-08-03 RX ADMIN — Medication 10 MILLIGRAM(S): at 06:20

## 2022-08-03 RX ADMIN — HEPARIN SODIUM 5000 UNIT(S): 5000 INJECTION INTRAVENOUS; SUBCUTANEOUS at 21:49

## 2022-08-03 RX ADMIN — Medication 1 TABLET(S): at 15:00

## 2022-08-03 RX ADMIN — LIDOCAINE 1 PATCH: 4 CREAM TOPICAL at 21:48

## 2022-08-03 NOTE — PROGRESS NOTE ADULT - ASSESSMENT
cellulitis rt leg- improving   hepB  core and surface antibody positive  Alessandra's syndrome   HTN  asymptomatic covid   elevated d-dimer

## 2022-08-03 NOTE — PROGRESS NOTE ADULT - ASSESSMENT
88 y/o F with PMHx of HLD, Cellulitis, Pre-DM, Hepatitis B, HTN, Thyroid Disease and Alessandra's Syndrome presents to the ED c/o rt leg cellulitis, discharged x1 week ago, however sx still persist after oral course of Abx Keflex, with new onset of itching that started this morning. Pt reports evaluation of cellulitis with Sono, endorses fever and rhinorrhea for x1 day since resolved, swelling, but denies hx of DM, hx of blood clots, hx of vascular issues, fever/chills, SOB or CP. Pt is not vaccinated against COVID.  Tested positive for covid but reports one day of cold like symptoms which have since resolved   never had a fever   vital signs stable  normal inflammatory markers and wbc   give some improvement last time she had antibiotics and there is increased calor and erythema would again treat for cellulitis. This is nonpurulent cellulitis and low suscpicion for MRSA infection and thus have stopped vancomycin. For now can continue Zosyn    8/2: afebrile, on RA, no WBC, Cr normalized, BCs with no growth to date, cellulitis is improving, + pruritus reported by the pt, triamcinolone ointment was ordered by the attending, Zosyn iv continued.   Attending addendum:  exam improving, continue zosyn   follow cultures  leg elevation  topical steroid to help alleviate the itchiness   DVT study pending  no role for monoclonal antibody in asymptomatic covid patient     8/3: US venous doppler study is negative for DVT of b/l LEs, cellulitis is improving, still has right LE swelling, could be related to prior surgery and cellulitis, no objections to change of abx to po, Augmentin, needs 10 days total of treatment, 6 more days.     Plan:  ok to change zosyn to po Augmentin, needs 6 more days of abx to complete 10 days course   triamcinolone ointment to her RLE  US doppler to rule out DVT - negative   Leg elevation   ACE wrap to the leg as well  if spikes fever, please send 2 sets of blood cultures    HBcAb reactive, HBsAg nonreactive, HBsAb reactive, old disease with clearance, nothing to do   pt's covid 19 is asymptomatic - no role for treatment     discussed with Dr. Caruso  ms sent to Dr. Ramirez  discussed with pt

## 2022-08-04 ENCOUNTER — TRANSCRIPTION ENCOUNTER (OUTPATIENT)
Age: 87
End: 2022-08-04

## 2022-08-04 VITALS
RESPIRATION RATE: 18 BRPM | DIASTOLIC BLOOD PRESSURE: 56 MMHG | HEART RATE: 52 BPM | TEMPERATURE: 98 F | SYSTOLIC BLOOD PRESSURE: 132 MMHG | OXYGEN SATURATION: 96 %

## 2022-08-04 LAB
ANION GAP SERPL CALC-SCNC: 9 MMOL/L — SIGNIFICANT CHANGE UP (ref 5–17)
BUN SERPL-MCNC: 23 MG/DL — SIGNIFICANT CHANGE UP (ref 7–23)
CALCIUM SERPL-MCNC: 9.2 MG/DL — SIGNIFICANT CHANGE UP (ref 8.5–10.1)
CHLORIDE SERPL-SCNC: 101 MMOL/L — SIGNIFICANT CHANGE UP (ref 96–108)
CO2 SERPL-SCNC: 24 MMOL/L — SIGNIFICANT CHANGE UP (ref 22–31)
CREAT SERPL-MCNC: 1.28 MG/DL — SIGNIFICANT CHANGE UP (ref 0.5–1.3)
EGFR: 41 ML/MIN/1.73M2 — LOW
FLUAV AG NPH QL: SIGNIFICANT CHANGE UP
FLUBV AG NPH QL: SIGNIFICANT CHANGE UP
GLUCOSE SERPL-MCNC: 115 MG/DL — HIGH (ref 70–99)
HCT VFR BLD CALC: 35.2 % — SIGNIFICANT CHANGE UP (ref 34.5–45)
HGB BLD-MCNC: 11.6 G/DL — SIGNIFICANT CHANGE UP (ref 11.5–15.5)
MCHC RBC-ENTMCNC: 28.7 PG — SIGNIFICANT CHANGE UP (ref 27–34)
MCHC RBC-ENTMCNC: 33 G/DL — SIGNIFICANT CHANGE UP (ref 32–36)
MCV RBC AUTO: 87.1 FL — SIGNIFICANT CHANGE UP (ref 80–100)
NRBC # BLD: 0 /100 WBCS — SIGNIFICANT CHANGE UP (ref 0–0)
PLATELET # BLD AUTO: 218 K/UL — SIGNIFICANT CHANGE UP (ref 150–400)
POTASSIUM SERPL-MCNC: 3.5 MMOL/L — SIGNIFICANT CHANGE UP (ref 3.5–5.3)
POTASSIUM SERPL-SCNC: 3.5 MMOL/L — SIGNIFICANT CHANGE UP (ref 3.5–5.3)
RBC # BLD: 4.04 M/UL — SIGNIFICANT CHANGE UP (ref 3.8–5.2)
RBC # FLD: 13.6 % — SIGNIFICANT CHANGE UP (ref 10.3–14.5)
SARS-COV-2 RNA SPEC QL NAA+PROBE: DETECTED
SODIUM SERPL-SCNC: 134 MMOL/L — LOW (ref 135–145)
WBC # BLD: 6.99 K/UL — SIGNIFICANT CHANGE UP (ref 3.8–10.5)
WBC # FLD AUTO: 6.99 K/UL — SIGNIFICANT CHANGE UP (ref 3.8–10.5)

## 2022-08-04 RX ORDER — HYDROCORTISONE 20 MG
1 TABLET ORAL
Qty: 0 | Refills: 0 | DISCHARGE
Start: 2022-08-04

## 2022-08-04 RX ORDER — HYDROCORTISONE 20 MG
1 TABLET ORAL
Qty: 0 | Refills: 0 | DISCHARGE

## 2022-08-04 RX ORDER — LEVOTHYROXINE SODIUM 125 MCG
1 TABLET ORAL
Qty: 0 | Refills: 0 | DISCHARGE

## 2022-08-04 RX ORDER — LEVOTHYROXINE SODIUM 125 MCG
1 TABLET ORAL
Qty: 0 | Refills: 0 | DISCHARGE
Start: 2022-08-04

## 2022-08-04 RX ORDER — ASCORBIC ACID 60 MG
1 TABLET,CHEWABLE ORAL
Qty: 0 | Refills: 0 | DISCHARGE
Start: 2022-08-04

## 2022-08-04 RX ORDER — PANTOPRAZOLE SODIUM 20 MG/1
1 TABLET, DELAYED RELEASE ORAL
Qty: 0 | Refills: 0 | DISCHARGE
Start: 2022-08-04

## 2022-08-04 RX ADMIN — LOSARTAN POTASSIUM 50 MILLIGRAM(S): 100 TABLET, FILM COATED ORAL at 06:00

## 2022-08-04 RX ADMIN — Medication 25 MILLIGRAM(S): at 14:23

## 2022-08-04 RX ADMIN — LIDOCAINE 1 PATCH: 4 CREAM TOPICAL at 07:49

## 2022-08-04 RX ADMIN — Medication 1 TABLET(S): at 11:43

## 2022-08-04 RX ADMIN — LIDOCAINE 1 PATCH: 4 CREAM TOPICAL at 11:42

## 2022-08-04 RX ADMIN — Medication 500 MILLIGRAM(S): at 11:43

## 2022-08-04 RX ADMIN — Medication 12.5 MILLIGRAM(S): at 06:00

## 2022-08-04 RX ADMIN — HEPARIN SODIUM 5000 UNIT(S): 5000 INJECTION INTRAVENOUS; SUBCUTANEOUS at 06:01

## 2022-08-04 RX ADMIN — Medication 10 MILLIGRAM(S): at 06:00

## 2022-08-04 RX ADMIN — Medication 100 MICROGRAM(S): at 05:59

## 2022-08-04 RX ADMIN — Medication 81 MILLIGRAM(S): at 05:59

## 2022-08-04 RX ADMIN — Medication 650 MILLIGRAM(S): at 02:48

## 2022-08-04 RX ADMIN — Medication 1 TABLET(S): at 06:00

## 2022-08-04 RX ADMIN — Medication 650 MILLIGRAM(S): at 01:26

## 2022-08-04 RX ADMIN — PANTOPRAZOLE SODIUM 40 MILLIGRAM(S): 20 TABLET, DELAYED RELEASE ORAL at 05:59

## 2022-08-04 RX ADMIN — Medication 1 APPLICATION(S): at 06:00

## 2022-08-04 NOTE — DISCHARGE NOTE NURSING/CASE MANAGEMENT/SOCIAL WORK - PATIENT PORTAL LINK FT
You can access the FollowMyHealth Patient Portal offered by [facility] by registering at the following website: [email or web address]. By joining [facility]’s FollowMyHealth portal, you will also be able to view your health information using other applications (apps) compatible with our system. no

## 2022-08-04 NOTE — PROGRESS NOTE ADULT - TIME BILLING
clinical eval, review labs , consult notes, / discuss with team, RN/
clinical eval, review labs, discuss with patient/ team
clinical eval, review lbs, discuss with patient  and team
clinical eval, review labs, consult notes, / discuss with patient/ team.

## 2022-08-04 NOTE — PROGRESS NOTE ADULT - PROVIDER SPECIALTY LIST ADULT
Internal Medicine
Internal Medicine
Infectious Disease
Internal Medicine
Infectious Disease
Internal Medicine

## 2022-08-04 NOTE — PROGRESS NOTE ADULT - REASON FOR ADMISSION
cellulitis rt leg/ covid 19/ HTn/ matthias's syndrome

## 2022-08-04 NOTE — DISCHARGE NOTE PROVIDER - NSDCCPCAREPLAN_GEN_ALL_CORE_FT
PRINCIPAL DISCHARGE DIAGNOSIS  Diagnosis: Cellulitis  Assessment and Plan of Treatment: Cellulitis  Cellulitis is a skin infection caused by bacteria. This condition occurs most often in the arms and lower legs but can occur anywhere over the body. Symptoms include redness, swelling, warm skin, tenderness, and chills/fever. If you were prescribed an antibiotic medicine, take it as told by your health care provider. Do not stop taking the antibiotic even if you start to feel better.  SEEK IMMEDIATE MEDICAL CARE IF YOU HAVE ANY OF THE FOLLOWING SYMPTOMS: worsening fever, red streaks coming from affected area, vomiting or diarrhea, or dizziness/lightheadedness.

## 2022-08-04 NOTE — PROGRESS NOTE ADULT - SUBJECTIVE AND OBJECTIVE BOX
PARISH WARD  MRN-[id number]    Follow Up:  cellulitis, covid 19    Interval History: The pt was seen and examined earlier, no distress, complains of mild pruritus at the cellulitis site. The pt remains on RA, no fevers, no WBC, Cr normalized.     PAST MEDICAL & SURGICAL HISTORY:  Sheehans syndrome      Thyroid disease      HTN (hypertension)      History of hepatitis B      Prediabetes      HLD (hyperlipidemia)      H/O: hysterectomy      H/O:   x4      History of excision of pilonidal cyst      H/O intestinal obstruction      S/P total knee replacement, right  2022      History of cataract surgery  B/l eyes          ROS:    [ ] Unobtainable because:  [x] All other systems negative    Constitutional: no fever, no chills  Head: no trauma  Eyes: no vision changes, no eye pain  ENT:  no sore throat, no rhinorrhea  Cardiovascular:  no chest pain, no palpitation  Respiratory:  no SOB, no cough  GI:  no abd pain, no vomiting, no diarrhea  urinary: no dysuria, no hematuria, no flank pain  musculoskeletal:  no joint pain, no joint swelling  skin:  pruritus   neurology:  no headache, no seizure, no change in mental status  psych: no anxiety, no depression         Allergies  codeine (Headache)  doxycycline (Hives)  scallops (Nausea)  shellfish (Nausea)        ANTIMICROBIALS:  piperacillin/tazobactam IVPB.. 3.375 every 8 hours      OTHER MEDS:  acetaminophen     Tablet .. 650 milliGRAM(s) Oral every 6 hours PRN  ascorbic acid 500 milliGRAM(s) Oral daily  aspirin  chewable 81 milliGRAM(s) Oral two times a day  diphenhydrAMINE 25 milliGRAM(s) Oral every 6 hours PRN  heparin   Injectable 5000 Unit(s) SubCutaneous every 8 hours  hydrochlorothiazide 12.5 milliGRAM(s) Oral daily  hydrocortisone 10 milliGRAM(s) Oral daily  hydrocortisone 5 milliGRAM(s) Oral daily  levothyroxine 100 MICROGram(s) Oral daily  lidocaine   4% Patch 1 Patch Transdermal every 24 hours  losartan 50 milliGRAM(s) Oral daily  multivitamin 1 Tablet(s) Oral daily  pantoprazole    Tablet 40 milliGRAM(s) Oral before breakfast  polyethylene glycol 3350 17 Gram(s) Oral at bedtime  triamcinolone 0.1% Ointment 1 Application(s) Topical two times a day      Vital Signs Last 24 Hrs  T(C): 36.9 (02 Aug 2022 12:15), Max: 36.9 (02 Aug 2022 05:40)  T(F): 98.4 (02 Aug 2022 12:15), Max: 98.4 (02 Aug 2022 05:40)  HR: 51 (02 Aug 2022 16:10) (51 - 59)  BP: 131/53 (02 Aug 2022 16:10) (109/61 - 156/63)  BP(mean): --  RR: 18 (02 Aug 2022 16:10) (18 - 18)  SpO2: 95% (02 Aug 2022 16:10) (94% - 96%)    Parameters below as of 02 Aug 2022 16:10  Patient On (Oxygen Delivery Method): room air        Physical Exam:  Constitutional: non-toxic, no distress  HEAD/EYES: anicteric, no conjunctival injection  ENT:  supple, no thrush  Cardiovascular:   normal S1, S2, no murmur, no edema  Respiratory:  clear BS bilaterally, no wheezes, no rales  GI:  soft, non-tender, normal bowel sounds  :  no phelan, no CVA tenderness  Musculoskeletal:  no synovitis, normal ROM  Neurologic: awake and alert, normal strength, no focal findings  Skin:  s/p right knee surgery with healing surgical site with mild erythema near the suture line, there is increased calor and erythema to the RLE from the ankle to midcalf - mild today  Heme/Onc: no lymphadenopathy   Psychiatric:  awake, alert, appropriate mood    WBC Count: 5.61 K/uL ( @ 07:20)  WBC Count: 6.11 K/uL ( @ 07:30)  WBC Count: 5.55 K/uL ( @ 12:00)                            11.1   5.61  )-----------( 186      ( 02 Aug 2022 07:20 )             33.8           137  |  103  |  18  ----------------------------<  96  3.6   |  29  |  1.30    Ca    9.3      02 Aug 2022 07:20      Creatinine Trend: 1.30<--, 1.52<--, 1.29<--, 1.33<--, 1.48<--, 1.14<--      MICROBIOLOGY:  v  .Blood Blood  22   No Growth Final  --  --      C-Reactive Protein, Serum: 7 ()  C-Reactive Protein, Serum: 9 ()  C-Reactive Protein, Serum: 8 ()  C-Reactive Protein, Serum: 24 ()    Ferritin, Serum: 387 ()      D-Dimer Assay, Quantitative: 2034 ()      SARS-CoV-2 Result: Detected (22 @ 12:00)      RADIOLOGY:    
PARISH WARD  MRN-[id number]    Follow Up:  cellulitis     Interval History: The pt was seen and examined, no distress, no new complaints. The pt remains afebrile, on RA, no new lab work.     PAST MEDICAL & SURGICAL HISTORY:  Sheehans syndrome      Thyroid disease      HTN (hypertension)      History of hepatitis B      Prediabetes      HLD (hyperlipidemia)      H/O: hysterectomy      H/O:   x4      History of excision of pilonidal cyst      H/O intestinal obstruction      S/P total knee replacement, right  2022      History of cataract surgery  B/l eyes          ROS:    [ ] Unobtainable because:  [x ] All other systems negative    Constitutional: no fever, no chills  Head: no trauma  Eyes: no vision changes, no eye pain  ENT:  no sore throat, no rhinorrhea  Cardiovascular:  no chest pain, no palpitation  Respiratory:  no SOB, no cough  GI:  no abd pain, no vomiting, no diarrhea  urinary: no dysuria, no hematuria, no flank pain  musculoskeletal:  right leg swelling, no joint pain, no joint swelling  skin:  no rash  neurology:  no headache, no seizure, no change in mental status  psych: no anxiety, no depression         Allergies  codeine (Headache)  doxycycline (Hives)  scallops (Nausea)  shellfish (Nausea)        ANTIMICROBIALS:  piperacillin/tazobactam IVPB.. 3.375 every 8 hours      OTHER MEDS:  acetaminophen     Tablet .. 650 milliGRAM(s) Oral every 6 hours PRN  ascorbic acid 500 milliGRAM(s) Oral daily  aspirin  chewable 81 milliGRAM(s) Oral two times a day  diphenhydrAMINE 25 milliGRAM(s) Oral every 6 hours PRN  heparin   Injectable 5000 Unit(s) SubCutaneous every 8 hours  hydrochlorothiazide 12.5 milliGRAM(s) Oral daily  hydrocortisone 10 milliGRAM(s) Oral daily  hydrocortisone 5 milliGRAM(s) Oral daily  levothyroxine 100 MICROGram(s) Oral daily  lidocaine   4% Patch 1 Patch Transdermal every 24 hours  losartan 50 milliGRAM(s) Oral daily  multivitamin 1 Tablet(s) Oral daily  pantoprazole    Tablet 40 milliGRAM(s) Oral before breakfast  polyethylene glycol 3350 17 Gram(s) Oral at bedtime  triamcinolone 0.1% Ointment 1 Application(s) Topical two times a day      Vital Signs Last 24 Hrs  T(C): 36.6 (03 Aug 2022 11:51), Max: 36.8 (02 Aug 2022 17:03)  T(F): 97.9 (03 Aug 2022 11:51), Max: 98.3 (02 Aug 2022 17:03)  HR: 50 (03 Aug 2022 11:51) (50 - 56)  BP: 111/52 (03 Aug 2022 11:51) (111/52 - 147/66)  BP(mean): --  RR: 17 (03 Aug 2022 11:51) (17 - 17)  SpO2: 94% (03 Aug 2022 11:51) (94% - 96%)        Physical Exam:  Constitutional: non-toxic, no distress  HEAD/EYES: anicteric, no conjunctival injection  ENT:  supple, no thrush  Cardiovascular:   normal S1, S2, no murmur, no edema  Respiratory:  clear BS bilaterally, no wheezes, no rales  GI:  soft, non-tender, normal bowel sounds  :  no phelan, no CVA tenderness  Musculoskeletal:  no synovitis, normal ROM  Neurologic: awake and alert, normal strength, no focal findings  Skin:  s/p right knee surgery with healing surgical site with mild erythema near the suture line, right calf with swelling, mildly warm, no significant erythema   Heme/Onc: no lymphadenopathy   Psychiatric:  awake, alert, appropriate mood    WBC Count: 5.61 K/uL ( @ 07:20)  WBC Count: 6.11 K/uL ( @ 07:30)  WBC Count: 5.55 K/uL ( @ 12:00)                            11.1   5.61  )-----------( 186      ( 02 Aug 2022 07:20 )             33.8           137  |  103  |  18  ----------------------------<  96  3.6   |  29  |  1.30    Ca    9.3      02 Aug 2022 07:20            Creatinine Trend: 1.30<--, 1.52<--, 1.29<--, 1.33<--, 1.48<--, 1.14<--      MICROBIOLOGY:  v  .Blood Blood  22   No Growth Final  --  --    C-Reactive Protein, Serum: 7 ()  C-Reactive Protein, Serum: 9 ()  C-Reactive Protein, Serum: 8 ()    Ferritin, Serum: 387 ()      D-Dimer Assay, Quantitative:  ()      SARS-CoV-2 Result: Detected (22 @ 12:00)      RADIOLOGY:    < from: US Duplex Venous Lower Ext Complete, Bilateral (22 @ 13:49) >  ACC: [id number] EXAM:  US DPLX LWR EXT VEINS COMPL BI                          PROCEDURE DATE:  2022          INTERPRETATION:  CLINICAL INFORMATION: Lower extremity erythema and   edema. Evaluate for DVT.    COMPARISON: None available.    TECHNIQUE: Duplex sonography of the BILATERAL LOWER extremity veins with   color and spectral Doppler, with and without compression.    FINDINGS:    RIGHT:  Normal compressibility of the RIGHT common femoral, femoral and popliteal   veins.  Doppler examination shows normal spontaneous and phasic flow.  No RIGHT calf vein thrombosis is detected.    Right popliteal fossa cyst measures 2.9 x 1.3 x 1.5 cm.    LEFT:  Normal compressibility of the LEFT common femoral, femoral and popliteal   veins.  Doppler examination shows normal spontaneous and phasic flow.  No LEFT calf vein thrombosis is detected.    IMPRESSION:  No evidence of deep venous thrombosis in either lower extremity.          --- End of Report ---            CHANI PABLO MD; Attending Radiologist  This document has been electronically signed. Aug  3 2022  2:01PM    < end of copied text >  
Patient is a 87y old  Female who presents with a chief complaint of cellulitis rt leg/ covid 19/ HTn/ matthias's syndrome (2022 14:04)  alert, no distress,    vitals stable   labs reviewed   covid -19 asymptomatic infection- discussed with ID dr Wick    INTERVAL HPI/OVERNIGHT EVENTS:  PAST MEDICAL & SURGICAL HISTORY:  HTN (hypertension)      History of hepatitis B      Prediabetes      HLD (hyperlipidemia)      Thyroid disease      Sheehans syndrome      H/O: hysterectomy      H/O:   x4      History of excision of pilonidal cyst      H/O intestinal obstruction      S/P total knee replacement, right  2022      History of cataract surgery  B/l eyes          MEDICATIONS  (STANDING):  ascorbic acid 500 milliGRAM(s) Oral daily  aspirin  chewable 81 milliGRAM(s) Oral two times a day  heparin   Injectable 5000 Unit(s) SubCutaneous every 8 hours  hydrochlorothiazide 12.5 milliGRAM(s) Oral daily  hydrocortisone 10 milliGRAM(s) Oral daily  hydrocortisone 5 milliGRAM(s) Oral daily  levothyroxine 100 MICROGram(s) Oral daily  lidocaine   4% Patch 1 Patch Transdermal every 24 hours  losartan 50 milliGRAM(s) Oral daily  multivitamin 1 Tablet(s) Oral daily  pantoprazole    Tablet 40 milliGRAM(s) Oral before breakfast  piperacillin/tazobactam IVPB.. 3.375 Gram(s) IV Intermittent every 8 hours  polyethylene glycol 3350 17 Gram(s) Oral at bedtime  vancomycin  IVPB 750 milliGRAM(s) IV Intermittent every 24 hours    MEDICATIONS  (PRN):  diphenhydrAMINE 25 milliGRAM(s) Oral every 6 hours PRN Rash and/or Itching      Allergies    codeine (Headache)  doxycycline (Hives)  scallops (Nausea)  shellfish (Nausea)    Intolerances        REVIEW OF SYSTEMS:  CONSTITUTIONAL: No fever, weight loss, or fatigue  EYES: No eye pain, visual disturbances, or discharge  ENMT:  No difficulty hearing, tinnitus, vertigo; No sinus or throat pain  NECK: No pain or stiffness  BREASTS: No pain, masses, or nipple discharge  RESPIRATORY: No cough, wheezing, chills or hemoptysis; No shortness of breath  CARDIOVASCULAR: No chest pain, palpitations, dizziness, or leg swelling  GASTROINTESTINAL: No abdominal or epigastric pain. No nausea, vomiting, or hematemesis; No diarrhea or constipation. No melena or hematochezia.  GENITOURINARY: No dysuria, frequency, hematuria, or incontinence  NEUROLOGICAL: No headaches, memory loss, loss of strength, numbness, or tremors  SKIN: No itching, burning, rashes, or lesions   LYMPH NODES: No enlarged glands  ENDOCRINE: No heat or cold intolerance; No hair loss  MUSCULOSKELETAL: No joint pain or swelling; No muscle, back, or extremity pain  PSYCHIATRIC: No depression, anxiety, mood swings, or difficulty sleeping  HEME/LYMPH: No easy bruising, or bleeding gums  ALLERY AND IMMUNOLOGIC: No hives or eczema    Vital Signs Last 24 Hrs  T(C): 36.7 (01 Aug 2022 05:39), Max: 36.7 (01 Aug 2022 05:39)  T(F): 98 (01 Aug 2022 05:39), Max: 98 (01 Aug 2022 05:39)  HR: 58 (01 Aug 2022 05:39) (48 - 71)  BP: 119/62 (01 Aug 2022 05:39) (111/63 - 140/70)  BP(mean): --  RR: 18 (01 Aug 2022 05:39) (16 - 18)  SpO2: 98% (01 Aug 2022 05:39) (96% - 98%)    Parameters below as of 01 Aug 2022 02:30  Patient On (Oxygen Delivery Method): room air        PHYSICAL EXAM:  GENERAL: NAD, well-groomed, well-developed  HEAD:  Atraumatic, Normocephalic  EYES: EOMI, PERRLA, conjunctiva and sclera clear  ENMT: No tonsillar erythema, exudates, or enlargement; Moist mucous membranes, Good dentition, No lesions  NECK: Supple, No JVD, Normal thyroid  NERVOUS SYSTEM:  Alert & Oriented X3, Good concentration; Motor Strength 5/5 B/L upper and lower extremities; DTRs 2+ intact and symmetric  CHEST/LUNG: Clear to percussion bilaterally; No rales, rhonchi, wheezing, or rubs  HEART: Regular rate and rhythm; No murmurs, rubs, or gallops  ABDOMEN: Soft, Nontender, Nondistended; Bowel sounds present  EXTREMITIES:  2+ Peripheral Pulses, No clubbing, cyanosis, or edema. there is erythema   from ankle to midcalf on rt leg. . has not  gotten worse since yesterday.   . scar of TKR healing well  LYMPH: No lymphadenopathy noted  SKIN: No rashes or lesions    LABS:                        11.8   6.11  )-----------( 216      ( 01 Aug 2022 07:30 )             35.7     07-    133<L>  |  99  |  27<H>  ----------------------------<  98  3.7   |  27  |  1.52<H>    Ca    8.9      2022 15:45    TPro  6.7  /  Alb  3.4  /  TBili  0.4  /  DBili  0.1  /  AST  28  /  ALT  22  /  AlkPhos  80  07-31      PT/INR - ( 01 Aug 2022 07:30 )   PT: 11.6 sec;   INR: 0.97 ratio         PTT - ( 01 Aug 2022 07:30 )  PTT:27.8 sec    CAPILLARY BLOOD GLUCOSE          CARDIAC MARKERS ( 2022 15:45 )  x     / x     / 32 U/L / x     / x                RADIOLOGY & ADDITIONAL TESTS:    Imaging Personally Reviewed:  [ ] YES  [ ] NO    Consultant(s) Notes Reviewed:  [ ] YES  [ ] NO    Care Discussed with Consultants/Other Providers [x ] YES  [ ] NO    Care discussed with family,         [  ]   yes  [  ]  No    imp:    stable[ ]    unstable[  ]     improving [ x  ]       unchanged  [  ]                Plans:  Continue present plans  [ x ] continue abx as per Id               New consult [  ]   specialty  .......               order test[  ]    test name.  labs in am                Discharge Planning  [  ]           
Patient is a 87y old  Female who presents with a chief complaint of cellulitis rt leg/ covid 19/ HTn/ matthias's syndrome (01 Aug 2022 17:35)  alert. no distress.   vitals stable   ID notes appreciated.    INTERVAL HPI/OVERNIGHT EVENTS:  PAST MEDICAL & SURGICAL HISTORY:  HTN (hypertension)      History of hepatitis B      Prediabetes      HLD (hyperlipidemia)      Thyroid disease      Sheehans syndrome      H/O: hysterectomy      H/O:   x4      History of excision of pilonidal cyst      H/O intestinal obstruction      S/P total knee replacement, right  2022      History of cataract surgery  B/l eyes          MEDICATIONS  (STANDING):  ascorbic acid 500 milliGRAM(s) Oral daily  aspirin  chewable 81 milliGRAM(s) Oral two times a day  heparin   Injectable 5000 Unit(s) SubCutaneous every 8 hours  hydrochlorothiazide 12.5 milliGRAM(s) Oral daily  hydrocortisone 10 milliGRAM(s) Oral daily  hydrocortisone 5 milliGRAM(s) Oral daily  levothyroxine 100 MICROGram(s) Oral daily  lidocaine   4% Patch 1 Patch Transdermal every 24 hours  losartan 50 milliGRAM(s) Oral daily  multivitamin 1 Tablet(s) Oral daily  pantoprazole    Tablet 40 milliGRAM(s) Oral before breakfast  piperacillin/tazobactam IVPB.. 3.375 Gram(s) IV Intermittent every 8 hours  polyethylene glycol 3350 17 Gram(s) Oral at bedtime    MEDICATIONS  (PRN):  acetaminophen     Tablet .. 650 milliGRAM(s) Oral every 6 hours PRN Temp greater or equal to 38C (100.4F), Mild Pain (1 - 3), Moderate Pain (4 - 6)  diphenhydrAMINE 25 milliGRAM(s) Oral every 6 hours PRN Rash and/or Itching      Allergies    codeine (Headache)  doxycycline (Hives)  scallops (Nausea)  shellfish (Nausea)    Intolerances        REVIEW OF SYSTEMS:  CONSTITUTIONAL: No fever, weight loss, or fatigue  EYES: No eye pain, visual disturbances, or discharge  ENMT:  No difficulty hearing, tinnitus, vertigo; No sinus or throat pain  NECK: No pain or stiffness  BREASTS: No pain, masses, or nipple discharge  RESPIRATORY: No cough, wheezing, chills or hemoptysis; No shortness of breath  CARDIOVASCULAR: No chest pain, palpitations, dizziness, or leg swelling  GASTROINTESTINAL: No abdominal or epigastric pain. No nausea, vomiting, or hematemesis; No diarrhea or constipation. No melena or hematochezia.  GENITOURINARY: No dysuria, frequency, hematuria, or incontinence  NEUROLOGICAL: No headaches, memory loss, loss of strength, numbness, or tremors  SKIN: No itching, burning, rashes, or lesions   LYMPH NODES: No enlarged glands  ENDOCRINE: No heat or cold intolerance; No hair loss  MUSCULOSKELETAL: No joint pain or swelling; No muscle, back, or extremity pain  PSYCHIATRIC: No depression, anxiety, mood swings, or difficulty sleeping  HEME/LYMPH: No easy bruising, or bleeding gums  ALLERY AND IMMUNOLOGIC: No hives or eczema    Vital Signs Last 24 Hrs  T(C): 36.9 (02 Aug 2022 12:15), Max: 37 (01 Aug 2022 16:04)  T(F): 98.4 (02 Aug 2022 12:15), Max: 98.6 (01 Aug 2022 16:04)  HR: 51 (02 Aug 2022 12:15) (51 - 59)  BP: 109/61 (02 Aug 2022 12:15) (109/61 - 156/63)  BP(mean): --  RR: 18 (02 Aug 2022 12:15) (18 - 18)  SpO2: 94% (02 Aug 2022 12:15) (94% - 96%)    Parameters below as of 01 Aug 2022 14:06  Patient On (Oxygen Delivery Method): room air        PHYSICAL EXAM:  GENERAL: NAD, well-groomed, well-developed  HEAD:  Atraumatic, Normocephalic  EYES: EOMI, PERRLA, conjunctiva and sclera clear  ENMT: No tonsillar erythema, exudates, or enlargement; Moist mucous membranes, Good dentition, No lesions  NECK: Supple, No JVD, Normal thyroid  NERVOUS SYSTEM:  Alert & Oriented X3, Good concentration; Motor Strength 5/5 B/L upper and lower extremities; DTRs 2+ intact and symmetric  CHEST/LUNG: Clear to percussion bilaterally; No rales, rhonchi, wheezing, or rubs  HEART: Regular rate and rhythm; No murmurs, rubs, or gallops  ABDOMEN: Soft, Nontender, Nondistended; Bowel sounds present  EXTREMITIES:  2+ Peripheral Pulses, No clubbing, cyanosis, or edema. cellulitis improving  LYMPH: No lymphadenopathy noted  SKIN: No rashes or lesions    LABS:                        11.1   5.61  )-----------( 186      ( 02 Aug 2022 07:20 )             33.8     08    137  |  103  |  18  ----------------------------<  96  3.6   |  29  |  1.30    Ca    9.3      02 Aug 2022 07:20    TPro  6.7  /  Alb  3.4  /  TBili  0.4  /  DBili  0.1  /  AST  28  /  ALT  22  /  AlkPhos  80  07-31      PT/INR - ( 01 Aug 2022 07:30 )   PT: 11.6 sec;   INR: 0.97 ratio         PTT - ( 01 Aug 2022 07:30 )  PTT:27.8 sec    CAPILLARY BLOOD GLUCOSE          CARDIAC MARKERS ( 2022 15:45 )  x     / x     / 32 U/L / x     / x                RADIOLOGY & ADDITIONAL TESTS:    Imaging Personally Reviewed:  [ ] YES  [ ] NO    Consultant(s) Notes Reviewed:  [ ] YES  [ ] NO    Care Discussed with Consultants/Other Providers [ ] YES  [ ] NO    Care discussed with family,         [  ]   yes  [  ]  No    imp:    stable[ x]    unstable[  ]     improving [   ]       unchanged  [  ]                Plans:  Continue present plans  [ x ]               New consult [  ]   specialty  .......               order test[  ]    test name.                  Discharge Planning  [  ]           
Patient is a 87y old  Female who presents with a chief complaint of cellulitis rt leg/ covid 19/ HTn/ matthias's syndrome (02 Aug 2022 17:26)  alert. no distress. vitals stable. cellulitis improving on IV Zosyn   no fever/ cough/ dyspnea   Venous doppler of Lower extremity done- report pending    INTERVAL HPI/OVERNIGHT EVENTS:  PAST MEDICAL & SURGICAL HISTORY:  HTN (hypertension)      History of hepatitis B      Prediabetes      HLD (hyperlipidemia)      Thyroid disease      Sheehans syndrome      H/O: hysterectomy      H/O:   x4      History of excision of pilonidal cyst      H/O intestinal obstruction      S/P total knee replacement, right  2022      History of cataract surgery  B/l eyes          MEDICATIONS  (STANDING):  ascorbic acid 500 milliGRAM(s) Oral daily  aspirin  chewable 81 milliGRAM(s) Oral two times a day  heparin   Injectable 5000 Unit(s) SubCutaneous every 8 hours  hydrochlorothiazide 12.5 milliGRAM(s) Oral daily  hydrocortisone 10 milliGRAM(s) Oral daily  hydrocortisone 5 milliGRAM(s) Oral daily  levothyroxine 100 MICROGram(s) Oral daily  lidocaine   4% Patch 1 Patch Transdermal every 24 hours  losartan 50 milliGRAM(s) Oral daily  multivitamin 1 Tablet(s) Oral daily  pantoprazole    Tablet 40 milliGRAM(s) Oral before breakfast  piperacillin/tazobactam IVPB.. 3.375 Gram(s) IV Intermittent every 8 hours  polyethylene glycol 3350 17 Gram(s) Oral at bedtime  triamcinolone 0.1% Ointment 1 Application(s) Topical two times a day    MEDICATIONS  (PRN):  acetaminophen     Tablet .. 650 milliGRAM(s) Oral every 6 hours PRN Temp greater or equal to 38C (100.4F), Mild Pain (1 - 3), Moderate Pain (4 - 6)  diphenhydrAMINE 25 milliGRAM(s) Oral every 6 hours PRN Rash and/or Itching      Allergies    codeine (Headache)  doxycycline (Hives)  scallops (Nausea)  shellfish (Nausea)    Intolerances        REVIEW OF SYSTEMS:  CONSTITUTIONAL: No fever, weight loss, or fatigue  EYES: No eye pain, visual disturbances, or discharge  ENMT:  No difficulty hearing, tinnitus, vertigo; No sinus or throat pain  NECK: No pain or stiffness  BREASTS: No pain, masses, or nipple discharge  RESPIRATORY: No cough, wheezing, chills or hemoptysis; No shortness of breath  CARDIOVASCULAR: No chest pain, palpitations, dizziness, or leg swelling  GASTROINTESTINAL: No abdominal or epigastric pain. No nausea, vomiting, or hematemesis; No diarrhea or constipation. No melena or hematochezia.  GENITOURINARY: No dysuria, frequency, hematuria, or incontinence  NEUROLOGICAL: No headaches, memory loss, loss of strength, numbness, or tremors  SKIN: No itching, burning, rashes, or lesions   LYMPH NODES: No enlarged glands  ENDOCRINE: No heat or cold intolerance; No hair loss  MUSCULOSKELETAL: No joint pain or swelling; No muscle, back, or extremity pain  PSYCHIATRIC: No depression, anxiety, mood swings, or difficulty sleeping  HEME/LYMPH: No easy bruising, or bleeding gums  ALLERY AND IMMUNOLOGIC: No hives or eczema    Vital Signs Last 24 Hrs  T(C): 36.6 (03 Aug 2022 11:51), Max: 36.8 (02 Aug 2022 17:03)  T(F): 97.9 (03 Aug 2022 11:51), Max: 98.3 (02 Aug 2022 17:03)  HR: 50 (03 Aug 2022 11:51) (50 - 56)  BP: 111/52 (03 Aug 2022 11:51) (111/52 - 147/66)  BP(mean): --  RR: 17 (03 Aug 2022 11:51) (17 - 18)  SpO2: 94% (03 Aug 2022 11:51) (94% - 96%)    Parameters below as of 02 Aug 2022 16:10  Patient On (Oxygen Delivery Method): room air        PHYSICAL EXAM:  GENERAL: NAD, well-groomed, well-developed  HEAD:  Atraumatic, Normocephalic  EYES: EOMI, PERRLA, conjunctiva and sclera clear  ENMT: No tonsillar erythema, exudates, or enlargement; Moist mucous membranes, Good dentition, No lesions  NECK: Supple, No JVD, Normal thyroid  NERVOUS SYSTEM:  Alert & Oriented X3, Good concentration; Motor Strength 5/5 B/L upper and lower extremities; DTRs 2+ intact and symmetric  CHEST/LUNG: Clear to percussion bilaterally; No rales, rhonchi, wheezing, or rubs  HEART: Regular rate and rhythm; No murmurs, rubs, or gallops  ABDOMEN: Soft, Nontender, Nondistended; Bowel sounds present  EXTREMITIES:  2+ Peripheral Pulses, No clubbing, cyanosis, or edema  LYMPH: No lymphadenopathy noted  SKIN: No rashes or lesions    LABS:                        11.1   5.61  )-----------( 186      ( 02 Aug 2022 07:20 )             33.8     08-02    137  |  103  |  18  ----------------------------<  96  3.6   |  29  |  1.30    Ca    9.3      02 Aug 2022 07:20            CAPILLARY BLOOD GLUCOSE                    RADIOLOGY & ADDITIONAL TESTS:    Imaging Personally Reviewed:  [ ] YES  [ ] NO    Consultant(s) Notes Reviewed:  [ ] YES  [ ] NO    Care Discussed with Consultants/Other Providers [ ] YES  [ ] NO    Care discussed with family,         [  ]   yes  [  ]  No    imp:    stable[ ]    unstable[  ]     improving [  x ]       unchanged  [  ]                Plans:  Continue present plans  [x  ] continue Iv zosyn as per Id                 New consult [  ]   specialty  .......               order test[  ]    test name.                  Discharge Planning  [  ]           
Patient is a 87y old  Female who presents with a chief complaint of cellulitis rt leg/ covid 19/ HTn/ matthias's syndrome (03 Aug 2022 16:46)  alert, walking with walker   vitals stable   cellulitis much improved.    INTERVAL HPI/OVERNIGHT EVENTS:  PAST MEDICAL & SURGICAL HISTORY:  HTN (hypertension)      History of hepatitis B      Prediabetes      HLD (hyperlipidemia)      Thyroid disease      Sheehans syndrome      H/O: hysterectomy      H/O:   x4      History of excision of pilonidal cyst      H/O intestinal obstruction      S/P total knee replacement, right  2022      History of cataract surgery  B/l eyes          MEDICATIONS  (STANDING):  amoxicillin  875 milliGRAM(s)/clavulanate 1 Tablet(s) Oral two times a day  ascorbic acid 500 milliGRAM(s) Oral daily  aspirin  chewable 81 milliGRAM(s) Oral two times a day  heparin   Injectable 5000 Unit(s) SubCutaneous every 8 hours  hydrochlorothiazide 12.5 milliGRAM(s) Oral daily  hydrocortisone 10 milliGRAM(s) Oral daily  hydrocortisone 5 milliGRAM(s) Oral daily  levothyroxine 100 MICROGram(s) Oral daily  lidocaine   4% Patch 1 Patch Transdermal every 24 hours  losartan 50 milliGRAM(s) Oral daily  multivitamin 1 Tablet(s) Oral daily  pantoprazole    Tablet 40 milliGRAM(s) Oral before breakfast  polyethylene glycol 3350 17 Gram(s) Oral at bedtime  triamcinolone 0.1% Ointment 1 Application(s) Topical two times a day    MEDICATIONS  (PRN):  acetaminophen     Tablet .. 650 milliGRAM(s) Oral every 6 hours PRN Temp greater or equal to 38C (100.4F), Mild Pain (1 - 3), Moderate Pain (4 - 6)  diphenhydrAMINE 25 milliGRAM(s) Oral every 6 hours PRN Rash and/or Itching      Allergies    codeine (Headache)  doxycycline (Hives)  scallops (Nausea)  shellfish (Nausea)    Intolerances        REVIEW OF SYSTEMS:  CONSTITUTIONAL: No fever, weight loss, or fatigue  EYES: No eye pain, visual disturbances, or discharge  ENMT:  No difficulty hearing, tinnitus, vertigo; No sinus or throat pain  NECK: No pain or stiffness  BREASTS: No pain, masses, or nipple discharge  RESPIRATORY: No cough, wheezing, chills or hemoptysis; No shortness of breath  CARDIOVASCULAR: No chest pain, palpitations, dizziness, or leg swelling  GASTROINTESTINAL: No abdominal or epigastric pain. No nausea, vomiting, or hematemesis; No diarrhea or constipation. No melena or hematochezia.  GENITOURINARY: No dysuria, frequency, hematuria, or incontinence  NEUROLOGICAL: No headaches, memory loss, loss of strength, numbness, or tremors  SKIN: No itching, burning, rashes, or lesions   LYMPH NODES: No enlarged glands  ENDOCRINE: No heat or cold intolerance; No hair loss  MUSCULOSKELETAL: No joint pain or swelling; No muscle, back, or extremity pain  PSYCHIATRIC: No depression, anxiety, mood swings, or difficulty sleeping  HEME/LYMPH: No easy bruising, or bleeding gums  ALLERY AND IMMUNOLOGIC: No hives or eczema    Vital Signs Last 24 Hrs  T(C): 36.9 (04 Aug 2022 05:16), Max: 37 (03 Aug 2022 17:21)  T(F): 98.4 (04 Aug 2022 05:16), Max: 98.6 (03 Aug 2022 17:21)  HR: 61 (04 Aug 2022 05:16) (50 - 61)  BP: 145/62 (04 Aug 2022 05:16) (108/59 - 149/70)  BP(mean): --  RR: 18 (04 Aug 2022 05:16) (17 - 18)  SpO2: 96% (04 Aug 2022 05:16) (94% - 97%)        PHYSICAL EXAM:  GENERAL: NAD, well-groomed, well-developed  HEAD:  Atraumatic, Normocephalic  EYES: EOMI, PERRLA, conjunctiva and sclera clear  ENMT: No tonsillar erythema, exudates, or enlargement; Moist mucous membranes, Good dentition, No lesions  NECK: Supple, No JVD, Normal thyroid  NERVOUS SYSTEM:  Alert & Oriented X3, Good concentration; Motor Strength 5/5 B/L upper and lower extremities; DTRs 2+ intact and symmetric  CHEST/LUNG: Clear to percussion bilaterally; No rales, rhonchi, wheezing, or rubs  HEART: Regular rate and rhythm; No murmurs, rubs, or gallops  ABDOMEN: Soft, Nontender, Nondistended; Bowel sounds present  EXTREMITIES:  2+ Peripheral Pulses, No clubbing, cyanosis, or edema. Cellulitis much improved.  LYMPH: No lymphadenopathy noted  SKIN: No rashes or lesions    LABS:                        11.6   6.99  )-----------( 218      ( 04 Aug 2022 06:52 )             35.2     08-04    134<L>  |  101  |  23  ----------------------------<  115<H>  3.5   |  24  |  1.28    Ca    9.2      04 Aug 2022 06:52            CAPILLARY BLOOD GLUCOSE                    RADIOLOGY & ADDITIONAL TESTS:    Imaging Personally Reviewed:  [ ] YES  [ ] NO    Consultant(s) Notes Reviewed:  [ ] YES  [ ] NO    Care Discussed with Consultants/Other Providers [ ] YES  [ ] NO    Care discussed with family,         [  ]   yes  [  ]  No    imp:    stable[x ]    unstable[  ]     improving [   ]       unchanged  [  ]                Plans:  Continue present plans  [ x ]               New consult [  ]   specialty  .......               order test[  ]    test name.                  Discharge Planning  [  ]

## 2022-08-04 NOTE — DISCHARGE NOTE PROVIDER - HOSPITAL COURSE
86 y/o F with PMHx of HLD, Cellulitis, Pre-DM, Hepatitis B, HTN, Thyroid Disease and Alessandra's Syndrome presents to the ED c/o rt leg cellulitis, discharged x1 week prior, however sx still persist after oral course of Abx Keflex, with new onset of itching that started this morning.  Tested positive for COVID, with a mild course not requiring supplemental oxygenation    Patient was admitted to med-surg and seen by infectious disease.   Initiated course of IV antibiotics and transitioned to PO to complete a total 10 day course.     On 8/4/22 this case was reviewed with Dr. Ramirez, the patient is medically stable and optimized for discharge. All medications were reviewed and appropriate prescriptions were sent to mutually agreed upon pharmacy.

## 2022-08-04 NOTE — DISCHARGE NOTE PROVIDER - NSDCMRMEDTOKEN_GEN_ALL_CORE_FT
amoxicillin-clavulanate 875 mg-125 mg oral tablet: 1 tab(s) orally 2 times a day  ascorbic acid 500 mg oral tablet: 1 tab(s) orally once a day  Aspirin Enteric Coated 81 mg oral delayed release tablet: 1 tab(s) orally 2 times a day MDD:2  hydrocortisone 10 mg oral tablet: 1 tab(s) orally once a day  hydrocortisone 5 mg oral tablet: 1 tab(s) orally once a day  levothyroxine 100 mcg (0.1 mg) oral tablet: 1 tab(s) orally once a day  losartan-hydrochlorothiazide 50 mg-12.5 mg oral tablet: 1 tab(s) orally once a day  metOLazone 5 mg oral tablet: 5 milligram(s) orally every other day  Multiple Vitamins oral tablet: 1 tab(s) orally once a day  pantoprazole 40 mg oral delayed release tablet: 1 tab(s) orally once a day (before a meal)  Repatha 140 mg/mL subcutaneous solution: subcutaneous 2 times a month  senna leaf extract oral tablet: 2 tab(s) orally once a day (at bedtime)  traMADol 50 mg oral tablet: 1 tab(s) orally every 4 hours, As Needed -Mild Pain (5-10) May take 1/2 tab for lesser pain MDD:8 tablets (400 mg/day)    Do not exceed 8 tabs/day.   triamcinolone 0.1% topical ointment: 1 application topically 2 times a day

## 2022-08-04 NOTE — DISCHARGE NOTE NURSING/CASE MANAGEMENT/SOCIAL WORK - NSDCPEFALRISK_GEN_ALL_CORE
For information on Fall & Injury Prevention, visit: [email or web address] OR  [email or web address] OR  https://www.cdc.gov/steadi/patient.html

## 2022-08-05 NOTE — ED ADULT TRIAGE NOTE - PAIN RATING/NUMBER SCALE (0-10): REST
4
Pt complaining of right sided rib pain x 3 weeks. Pt denies any recent injury. Pt states that he had a mass removed through that area in 2020.

## 2022-08-09 DIAGNOSIS — Z79.890 HORMONE REPLACEMENT THERAPY: ICD-10-CM

## 2022-08-09 DIAGNOSIS — E23.0 HYPOPITUITARISM: ICD-10-CM

## 2022-08-09 DIAGNOSIS — R73.03 PREDIABETES: ICD-10-CM

## 2022-08-09 DIAGNOSIS — Z79.891 LONG TERM (CURRENT) USE OF OPIATE ANALGESIC: ICD-10-CM

## 2022-08-09 DIAGNOSIS — Z79.82 LONG TERM (CURRENT) USE OF ASPIRIN: ICD-10-CM

## 2022-08-09 DIAGNOSIS — Z28.310 UNVACCINATED FOR COVID-19: ICD-10-CM

## 2022-08-09 DIAGNOSIS — Z86.19 PERSONAL HISTORY OF OTHER INFECTIOUS AND PARASITIC DISEASES: ICD-10-CM

## 2022-08-09 DIAGNOSIS — B19.10 UNSPECIFIED VIRAL HEPATITIS B WITHOUT HEPATIC COMA: ICD-10-CM

## 2022-08-09 DIAGNOSIS — Z88.1 ALLERGY STATUS TO OTHER ANTIBIOTIC AGENTS STATUS: ICD-10-CM

## 2022-08-09 DIAGNOSIS — L03.115 CELLULITIS OF RIGHT LOWER LIMB: ICD-10-CM

## 2022-08-09 DIAGNOSIS — U07.1 COVID-19: ICD-10-CM

## 2022-08-09 DIAGNOSIS — Z96.651 PRESENCE OF RIGHT ARTIFICIAL KNEE JOINT: ICD-10-CM

## 2022-08-09 DIAGNOSIS — Z88.6 ALLERGY STATUS TO ANALGESIC AGENT: ICD-10-CM

## 2022-08-09 DIAGNOSIS — Z90.710 ACQUIRED ABSENCE OF BOTH CERVIX AND UTERUS: ICD-10-CM

## 2022-08-09 DIAGNOSIS — Z79.899 OTHER LONG TERM (CURRENT) DRUG THERAPY: ICD-10-CM

## 2022-08-09 DIAGNOSIS — I10 ESSENTIAL (PRIMARY) HYPERTENSION: ICD-10-CM

## 2022-08-09 DIAGNOSIS — E78.5 HYPERLIPIDEMIA, UNSPECIFIED: ICD-10-CM

## 2022-08-09 DIAGNOSIS — Z91.013 ALLERGY TO SEAFOOD: ICD-10-CM

## 2022-08-30 ENCOUNTER — APPOINTMENT (OUTPATIENT)
Dept: ORTHOPEDIC SURGERY | Facility: CLINIC | Age: 87
End: 2022-08-30

## 2022-08-30 ENCOUNTER — RESULT CHARGE (OUTPATIENT)
Age: 87
End: 2022-08-30

## 2022-08-30 VITALS — HEIGHT: 61 IN | WEIGHT: 180 LBS | BODY MASS INDEX: 33.99 KG/M2

## 2022-08-30 PROCEDURE — 99024 POSTOP FOLLOW-UP VISIT: CPT

## 2022-08-30 PROCEDURE — 73562 X-RAY EXAM OF KNEE 3: CPT | Mod: RT

## 2022-08-30 NOTE — DISCUSSION/SUMMARY
[de-identified] : The patient was advised of the diagnosis.  The natural history of the pathology was explained in full to the patient in layman's terms. All questions were answered.  The risks and benefits of surgical and non-surgical treatment alternatives were explained in full to the patient.\par

## 2022-08-30 NOTE — ASSESSMENT
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 She has failed all forms of conservative treatment and is ready to proceed with TKA. Risks and benefits discussed. Hx of bleeding on Aspirin in her gums. Has a wedding in June and will plan for TKA after this. 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 IS on steroids 15 mg daily\par 7/26/22: 3 weeks postop had a recent episode of cellulitis and has been taking Abx. Having fevers/chills postop and will follow up with DAQUAN Lorenzo at the end of this week if this continues. may need readmission for more IV abx - no sign of knee infection -  Advised to follow up with PCP. \par \par 8/30/22: 2 months postop doing very well, cellulitis resolved, no signs of infection.  Cont PT.  May return tp anam/pilates when she feels able.

## 2022-08-30 NOTE — HISTORY OF PRESENT ILLNESS
[9] : 9 [Sharp] : sharp [Household chores] : household chores [de-identified] : 8/30/22: 2 months postop - improving.  No fevers/chills.  Cellulitis resolved.  Going to PT.\par \par Prev doc:\par 3/14/22: Pain in the right knee for multiple years that has gotten progressively worse. She has tried HA injections 2x, the first one helped for about 3 worse but her most recent series provided no benefit. She has done PT with slight benefit. She also tired CSI that lasted for 3 days\par 7/26/22: 3 weeks postop R TKA having fevers/chills and has a cough and sinus blockage. Had a recent episode up of cellulitis and went to the ED. Having swelling and pain in the knee as well. Is currently taking Abx.  [FreeTextEntry5] : po #2

## 2022-08-30 NOTE — PHYSICAL EXAM
[Right] : right knee [AP] : anteroposterior [Lateral] : lateral [[place]] : skyline [Components well fixed, in good position] : Components well fixed, in good position [de-identified] : Right knee: Inc healed.  No effusion.  No redness.  ROM 0-120.  Cane.

## 2022-10-07 ENCOUNTER — APPOINTMENT (OUTPATIENT)
Dept: ORTHOPEDIC SURGERY | Facility: CLINIC | Age: 87
End: 2022-10-07

## 2022-10-07 VITALS — BODY MASS INDEX: 33.99 KG/M2 | HEIGHT: 61 IN | WEIGHT: 180 LBS

## 2022-10-07 DIAGNOSIS — E78.00 PURE HYPERCHOLESTEROLEMIA, UNSPECIFIED: ICD-10-CM

## 2022-10-07 PROCEDURE — 99213 OFFICE O/P EST LOW 20 MIN: CPT

## 2022-10-07 NOTE — PHYSICAL EXAM
[de-identified] : Right knee: Inc healed.  No effusion.  No redness.  ROM 0-120.  Cane.\par \par Lspine: Right paraspinal tenderness.  Pos SLR.  NVI.

## 2022-10-07 NOTE — ASSESSMENT
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 She has failed all forms of conservative treatment and is ready to proceed with TKA. Risks and benefits discussed. Hx of bleeding on Aspirin in her gums. Has a wedding in June and will plan for TKA after this. 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 IS on steroids 15 mg daily\par 7/26/22: 3 weeks postop had a recent episode of cellulitis and has been taking Abx. Having fevers/chills postop and will follow up with DAQUAN Lorenzo at the end of this week if this continues. may need readmission for more IV abx - no sign of knee infection -  Advised to follow up with PCP. \par 8/30/22: 2 months postop doing very well, cellulitis resolved, no signs of infection.  Cont PT.  May return tp anam/pilates when she feels able.\par \par 10/7/22: Right TKA doing well, still some swelling in leg but significant improvement and normal for course.  Acute onset sciatic pain in right leg started last night - will try NSAIDs for this and if no improvement will get further workup.

## 2022-10-07 NOTE — HISTORY OF PRESENT ILLNESS
[8] : 8 [3] : 3 [Sharp] : sharp [Intermittent] : intermittent [Leisure] : leisure [Meds] : meds [Ice] : ice [Physical therapy] : physical therapy [Standing] : standing [de-identified] : 8/30/22: 2 months postop - improving.  No fevers/chills.  Cellulitis resolved.  Going to PT.\par \par Prev doc:\par 3/14/22: Pain in the right knee for multiple years that has gotten progressively worse. She has tried HA injections 2x, the first one helped for about 3 worse but her most recent series provided no benefit. She has done PT with slight benefit. She also tired CSI that lasted for 3 days\par 7/26/22: 3 weeks postop R TKA having fevers/chills and has a cough and sinus blockage. Had a recent episode up of cellulitis and went to the ED. Having swelling and pain in the knee as well. Is currently taking Abx.  [] : no [FreeTextEntry1] : rt knee [FreeTextEntry5] : Pt is here to follow up on rt knee pain. Pt is currently doing physical therapy 2x a week, and finds it does help. Pt as well sees a , and does pilates 2x a week to try and assist pain. Pt states radiating down lt leg. Pt states pain has not changed since last visit. Pt notices swelling in knee and calf. Pt takes tylenol. Pt ices daily.  [FreeTextEntry7] : lt leg

## 2022-10-07 NOTE — DISCUSSION/SUMMARY
[de-identified] : The patient was advised of the diagnosis.  The natural history of the pathology was explained in full to the patient in layman's terms. All questions were answered.  The risks and benefits of surgical and non-surgical treatment alternatives were explained in full to the patient.\par

## 2022-11-08 ENCOUNTER — APPOINTMENT (OUTPATIENT)
Dept: ORTHOPEDIC SURGERY | Facility: CLINIC | Age: 87
End: 2022-11-08

## 2022-11-08 PROCEDURE — 99213 OFFICE O/P EST LOW 20 MIN: CPT

## 2022-11-08 NOTE — ASSESSMENT
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 She has failed all forms of conservative treatment and is ready to proceed with TKA. Risks and benefits discussed. Hx of bleeding on Aspirin in her gums. Has a wedding in June and will plan for TKA after this. 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 IS on steroids 15 mg daily\par 7/26/22: 3 weeks postop had a recent episode of cellulitis and has been taking Abx. Having fevers/chills postop and will follow up with DAQUAN Lorenzo at the end of this week if this continues. may need readmission for more IV abx - no sign of knee infection -  Advised to follow up with PCP. \par 8/30/22: 2 months postop doing very well, cellulitis resolved, no signs of infection.  Cont PT.  May return tp anam/pilates when she feels able.\par 10/7/22: Right TKA doing well, still some swelling in leg but significant improvement and normal for course.  Acute onset sciatic pain in right leg started last night - will try NSAIDs for this and if no improvement will get further workup.\par \par 11/8/22: Right knee has good function, no signs of infection.  Mult neg dopplers to this point.  Has some trendelenberg gait - will work on gait training at PT as this is her biggest complaint at this time.

## 2022-11-08 NOTE — DISCUSSION/SUMMARY
[de-identified] : The patient was advised of the diagnosis.  The natural history of the pathology was explained in full to the patient in layman's terms. All questions were answered.  The risks and benefits of surgical and non-surgical treatment alternatives were explained in full to the patient.\par

## 2022-11-08 NOTE — HISTORY OF PRESENT ILLNESS
[Right Leg] : right leg [Dull/Aching] : dull/aching [Intermittent] : intermittent [Rest] : rest [] : yes [de-identified] : 11/8/22: Cont post knee pain and calf pain.  Going to PT, still feels she limps when she walks same as preop.\par \par Prev doc:\par 3/14/22: Pain in the right knee for multiple years that has gotten progressively worse. She has tried HA injections 2x, the first one helped for about 3 worse but her most recent series provided no benefit. She has done PT with slight benefit. She also tired CSI that lasted for 3 days\par 7/26/22: 3 weeks postop R TKA having fevers/chills and has a cough and sinus blockage. Had a recent episode up of cellulitis and went to the ED. Having swelling and pain in the knee as well. Is currently taking Abx. \par 8/30/22: 2 months postop - improving.  No fevers/chills.  Cellulitis resolved.  Going to PT. [FreeTextEntry5] : PARISH is here today for right leg pain. pt states she is not experiencing knee pain, only calf pain. there is swelling and itching.  [de-identified] : palpation  [de-identified] : physical therapy

## 2022-12-03 ENCOUNTER — INPATIENT (INPATIENT)
Facility: HOSPITAL | Age: 87
LOS: 2 days | Discharge: HOME | End: 2022-12-06
Attending: STUDENT IN AN ORGANIZED HEALTH CARE EDUCATION/TRAINING PROGRAM | Admitting: STUDENT IN AN ORGANIZED HEALTH CARE EDUCATION/TRAINING PROGRAM

## 2022-12-03 VITALS
TEMPERATURE: 98 F | DIASTOLIC BLOOD PRESSURE: 63 MMHG | HEART RATE: 63 BPM | WEIGHT: 186.07 LBS | SYSTOLIC BLOOD PRESSURE: 108 MMHG | HEIGHT: 61 IN | RESPIRATION RATE: 20 BRPM | OXYGEN SATURATION: 96 %

## 2022-12-03 DIAGNOSIS — Z98.891 HISTORY OF UTERINE SCAR FROM PREVIOUS SURGERY: Chronic | ICD-10-CM

## 2022-12-03 DIAGNOSIS — Z98.890 OTHER SPECIFIED POSTPROCEDURAL STATES: Chronic | ICD-10-CM

## 2022-12-03 DIAGNOSIS — Z90.710 ACQUIRED ABSENCE OF BOTH CERVIX AND UTERUS: Chronic | ICD-10-CM

## 2022-12-03 DIAGNOSIS — Z87.19 PERSONAL HISTORY OF OTHER DISEASES OF THE DIGESTIVE SYSTEM: Chronic | ICD-10-CM

## 2022-12-03 DIAGNOSIS — Z96.651 PRESENCE OF RIGHT ARTIFICIAL KNEE JOINT: Chronic | ICD-10-CM

## 2022-12-03 DIAGNOSIS — Z98.49 CATARACT EXTRACTION STATUS, UNSPECIFIED EYE: Chronic | ICD-10-CM

## 2022-12-03 PROCEDURE — 99285 EMERGENCY DEPT VISIT HI MDM: CPT

## 2022-12-03 PROCEDURE — 93010 ELECTROCARDIOGRAM REPORT: CPT

## 2022-12-03 RX ORDER — PIPERACILLIN AND TAZOBACTAM 4; .5 G/20ML; G/20ML
3.38 INJECTION, POWDER, LYOPHILIZED, FOR SOLUTION INTRAVENOUS ONCE
Refills: 0 | Status: COMPLETED | OUTPATIENT
Start: 2022-12-03 | End: 2022-12-03

## 2022-12-03 RX ORDER — VANCOMYCIN HCL 1 G
1000 VIAL (EA) INTRAVENOUS ONCE
Refills: 0 | Status: COMPLETED | OUTPATIENT
Start: 2022-12-03 | End: 2022-12-03

## 2022-12-03 RX ADMIN — PIPERACILLIN AND TAZOBACTAM 200 GRAM(S): 4; .5 INJECTION, POWDER, LYOPHILIZED, FOR SOLUTION INTRAVENOUS at 23:39

## 2022-12-03 NOTE — ED PROVIDER NOTE - MUSCULOSKELETAL [+], MLM
left leg redness, swelling Opioid Pregnancy And Lactation Text: These medications can lead to premature delivery and should be avoided during pregnancy. These medications are also present in breast milk in small amounts.

## 2022-12-03 NOTE — ED PROVIDER NOTE - OBJECTIVE STATEMENT
87 year old female with PMH of HLD, HTN, Alessandra Syndrome, Thyroid disorder, Hep B  otherwise presenting to ED due to lower leg left redness - was recently on keflex and bactrim without significant improvement - had outpt US noted DVT negative - sent in for further IV abx.

## 2022-12-03 NOTE — ED PROVIDER NOTE - NS ED ATTENDING STATEMENT MOD
Attending Only Protocol For Uva1: The patient received UVA1. Changes In Treatment Protocol: Increase by 25mj and hold at 1400 my. Protocol For Photochemotherapy: Petrolatum And Nbuvb: The patient received Photochemotherapy: Petrolatum and NBUVB (petrolatum applied to all lesions prior to phototherapy). Total Body Energy: 1175 Protocol For Uva: The patient received UVA. Protocol For Nb Uva: The patient received NB UVA. Protocol For Photochemotherapy: Triamcinolone Ointment And Nbuvb: The patient received Photochemotherapy: Triamcinolone and NBUVB (triamcinolone ointment applied to all lesions prior to phototherapy). Detail Level: Zone Protocol For Photochemotherapy: Tar And Broad Band Uvb (Goeckerman Treatment): The patient received Photochemotherapy: Tar and Broad Band UVB (Goeckerman treatment). Protocol For Photochemotherapy For Severe Photoresponsive Dermatoses: Tar And Broad Band Uvb (Goeckerman Treatment): The patient received Photochemotherapy for severe photoresponsive dermatoses: Tar and Broad Band UVB (Goeckerman treatment) requiring at least 4 to 8 hours of care under direct physician supervision. Protocol For Photochemotherapy For Severe Photoresponsive Dermatoses: Petrolatum And Nbuvb: The patient received Photochemotherapy for severe photoresponsive dermatoses: Petrolatum and NBUVB requiring at least 4 to 8 hours of care under direct physician supervision. Protocol For Photochemotherapy: Tar And Nbuvb (Goeckerman Treatment): The patient received Photochemotherapy: Tar and NBUVB (Goeckerman treatment). Skin Type: IV Protocol For Photochemotherapy: Petrolatum And Broad Band Uvb: The patient received Photochemotherapy: Petrolatum and Broad Band UVB. Protocol For Photochemotherapy: Mineral Oil And Broad Band Uvb: The patient received Photochemotherapy: Mineral Oil and Broad Band UVB. Total Body Time: 8:35 Comments: Start 200 and increase by 50 to max 1000mj as tolerated Protocol For Photochemotherapy: Baby Oil And Nbuvb: The patient received Photochemotherapy: Baby Oil and NBUVB (baby oil applied to all lesions prior to phototherapy). Render Post-Care In The Note: no Protocol For Photochemotherapy For Severe Photoresponsive Dermatoses: Petrolatum And Broad Band Uvb: The patient received Photochemotherapyfor severe photoresponsive dermatoses: Petrolatum and Broad Band UVB requiring at least 4 to 8 hours of care under direct physician supervision. Name Of Supervising Technician: ./alena Protocol For Photochemotherapy For Severe Photoresponsive Dermatoses: Puva: The patient received Photochemotherapy for severe photoresponsive dermatoses: PUVA requiring at least 4 to 8 hours of care under direct physician supervision. Comments On Previous Treatment: Restart at 200 mj/cm2 Increase by 50 hold at 1000mj Protocol For Broad Band Uvb: The patient received Broad Band UVB. Post-Care Instructions: I reviewed with the patient in detail post-care instructions. Patient is to wear sun protection. Patients may expect sunburn like redness, discomfort and scabbing. Protocol For Puva: The patient received PUVA. Protocol For Photochemotherapy: Mineral Oil And Nbuvb: The patient received Photochemotherapy: Mineral Oil and NBUVB (mineral oil applied to all lesions prior to phototherapy). Protocol For Protocol For Photochemotherapy For Severe Photoresponsive Dermatoses: Bath Puva: The patient received Photochemotherapy for severe photoresponsive dermatoses: Bath PUVA requiring at least 4 to 8 hours of care under direct physician supervision. Protocol For Photochemotherapy For Severe Photoresponsive Dermatoses: Tar And Nbuvb (Goeckerman Treatment): The patient received Photochemotherapy for severe photoresponsive dermatoses: Tar and NBUVB (Goeckerman treatment) requiring at least 4 to 8 hours of care under direct physician supervision. Protocol: NBUVB Protocol For Bath Puva: The patient received Bath PUVA. Consent: Written consent obtained.  The risks were reviewed with the patient including but not limited to: burn, pigmentary changes, pain, blistering, scabbing, redness, increased risk of skin cancers, and the remote possibility of scarring. Protocol For Nbuvb: The patient received NBUVB.

## 2022-12-03 NOTE — ED ADULT TRIAGE NOTE - CHIEF COMPLAINT QUOTE
Hot, red lower left leg not responding to PO antibiotics  ,sent by [facility] for evaluation H/O Lymphedema

## 2022-12-03 NOTE — ED ADULT NURSE REASSESSMENT NOTE - NS ED NURSE REASSESS COMMENT FT1
covering for primary RN, IV placed labs collected, EKG to be done pt is A&Ox4, IV antibiotics to be initiated

## 2022-12-03 NOTE — ED PROVIDER NOTE - CARDIAC, MLM
Normal rate, regular rhythm.  Heart sounds S1, S2.  No murmurs, rubs or gallops.
Problem related to social environment

## 2022-12-03 NOTE — ED PROVIDER NOTE - SKIN COLOR
left calf redness extending to just distal to knee, small round healed abrasion behind calf as well as on anterior shin

## 2022-12-03 NOTE — ED PROVIDER NOTE - ENMT, MLM
Airway patent, Nasal mucosa clear. Mouth with normal mucosa. Throat has no vesicles, no oropharyngeal exudates and uvula is midline. [Follow-Up] : a follow-up evaluation of

## 2022-12-03 NOTE — ED ADULT NURSE NOTE - OBJECTIVE STATEMENT
as per patient " sent by MD for worsen cellulitis to  left lower leg x 7 days, bactrim/ keflex Po not helping." [ Noted redness/tenderness to left lower leg. Denies fevers]

## 2022-12-04 DIAGNOSIS — B99.9 UNSPECIFIED INFECTIOUS DISEASE: ICD-10-CM

## 2022-12-04 DIAGNOSIS — E78.5 HYPERLIPIDEMIA, UNSPECIFIED: ICD-10-CM

## 2022-12-04 DIAGNOSIS — N17.9 ACUTE KIDNEY FAILURE, UNSPECIFIED: ICD-10-CM

## 2022-12-04 DIAGNOSIS — E03.9 HYPOTHYROIDISM, UNSPECIFIED: ICD-10-CM

## 2022-12-04 DIAGNOSIS — I10 ESSENTIAL (PRIMARY) HYPERTENSION: ICD-10-CM

## 2022-12-04 DIAGNOSIS — L03.116 CELLULITIS OF LEFT LOWER LIMB: ICD-10-CM

## 2022-12-04 DIAGNOSIS — E23.0 HYPOPITUITARISM: ICD-10-CM

## 2022-12-04 LAB
ALBUMIN SERPL ELPH-MCNC: 3.8 G/DL — SIGNIFICANT CHANGE UP (ref 3.3–5)
ALP SERPL-CCNC: 69 U/L — SIGNIFICANT CHANGE UP (ref 40–120)
ALT FLD-CCNC: 23 U/L — SIGNIFICANT CHANGE UP (ref 12–78)
ANION GAP SERPL CALC-SCNC: 9 MMOL/L — SIGNIFICANT CHANGE UP (ref 5–17)
AST SERPL-CCNC: 42 U/L — HIGH (ref 15–37)
BASOPHILS # BLD AUTO: 0.06 K/UL — SIGNIFICANT CHANGE UP (ref 0–0.2)
BASOPHILS NFR BLD AUTO: 0.8 % — SIGNIFICANT CHANGE UP (ref 0–2)
BILIRUB SERPL-MCNC: 0.4 MG/DL — SIGNIFICANT CHANGE UP (ref 0.2–1.2)
BUN SERPL-MCNC: 30 MG/DL — HIGH (ref 7–23)
CALCIUM SERPL-MCNC: 10.2 MG/DL — HIGH (ref 8.5–10.1)
CHLORIDE SERPL-SCNC: 104 MMOL/L — SIGNIFICANT CHANGE UP (ref 96–108)
CO2 SERPL-SCNC: 25 MMOL/L — SIGNIFICANT CHANGE UP (ref 22–31)
CREAT SERPL-MCNC: 1.83 MG/DL — HIGH (ref 0.5–1.3)
CRP SERPL-MCNC: 9 MG/L — HIGH
EGFR: 26 ML/MIN/1.73M2 — LOW
EOSINOPHIL # BLD AUTO: 0.35 K/UL — SIGNIFICANT CHANGE UP (ref 0–0.5)
EOSINOPHIL NFR BLD AUTO: 4.8 % — SIGNIFICANT CHANGE UP (ref 0–6)
ERYTHROCYTE [SEDIMENTATION RATE] IN BLOOD: 17 MM/HR — SIGNIFICANT CHANGE UP (ref 0–20)
FLUAV AG NPH QL: SIGNIFICANT CHANGE UP
FLUBV AG NPH QL: SIGNIFICANT CHANGE UP
GLUCOSE SERPL-MCNC: 106 MG/DL — HIGH (ref 70–99)
HCT VFR BLD CALC: 36.4 % — SIGNIFICANT CHANGE UP (ref 34.5–45)
HGB BLD-MCNC: 11.8 G/DL — SIGNIFICANT CHANGE UP (ref 11.5–15.5)
IMM GRANULOCYTES NFR BLD AUTO: 0.4 % — SIGNIFICANT CHANGE UP (ref 0–0.9)
LACTATE SERPL-SCNC: 1.1 MMOL/L — SIGNIFICANT CHANGE UP (ref 0.7–2)
LYMPHOCYTES # BLD AUTO: 1.8 K/UL — SIGNIFICANT CHANGE UP (ref 1–3.3)
LYMPHOCYTES # BLD AUTO: 24.6 % — SIGNIFICANT CHANGE UP (ref 13–44)
MCHC RBC-ENTMCNC: 29.6 PG — SIGNIFICANT CHANGE UP (ref 27–34)
MCHC RBC-ENTMCNC: 32.4 G/DL — SIGNIFICANT CHANGE UP (ref 32–36)
MCV RBC AUTO: 91.2 FL — SIGNIFICANT CHANGE UP (ref 80–100)
MONOCYTES # BLD AUTO: 0.64 K/UL — SIGNIFICANT CHANGE UP (ref 0–0.9)
MONOCYTES NFR BLD AUTO: 8.7 % — SIGNIFICANT CHANGE UP (ref 2–14)
NEUTROPHILS # BLD AUTO: 4.45 K/UL — SIGNIFICANT CHANGE UP (ref 1.8–7.4)
NEUTROPHILS NFR BLD AUTO: 60.7 % — SIGNIFICANT CHANGE UP (ref 43–77)
NRBC # BLD: 0 /100 WBCS — SIGNIFICANT CHANGE UP (ref 0–0)
PLATELET # BLD AUTO: 172 K/UL — SIGNIFICANT CHANGE UP (ref 150–400)
POTASSIUM SERPL-MCNC: 4.9 MMOL/L — SIGNIFICANT CHANGE UP (ref 3.5–5.3)
POTASSIUM SERPL-SCNC: 4.9 MMOL/L — SIGNIFICANT CHANGE UP (ref 3.5–5.3)
PROT SERPL-MCNC: 7.4 GM/DL — SIGNIFICANT CHANGE UP (ref 6–8.3)
RBC # BLD: 3.99 M/UL — SIGNIFICANT CHANGE UP (ref 3.8–5.2)
RBC # FLD: 13.6 % — SIGNIFICANT CHANGE UP (ref 10.3–14.5)
SARS-COV-2 RNA SPEC QL NAA+PROBE: SIGNIFICANT CHANGE UP
SODIUM SERPL-SCNC: 138 MMOL/L — SIGNIFICANT CHANGE UP (ref 135–145)
WBC # BLD: 7.33 K/UL — SIGNIFICANT CHANGE UP (ref 3.8–10.5)
WBC # FLD AUTO: 7.33 K/UL — SIGNIFICANT CHANGE UP (ref 3.8–10.5)

## 2022-12-04 PROCEDURE — 73590 X-RAY EXAM OF LOWER LEG: CPT | Mod: 26,LT

## 2022-12-04 PROCEDURE — 99223 1ST HOSP IP/OBS HIGH 75: CPT

## 2022-12-04 RX ORDER — PIPERACILLIN AND TAZOBACTAM 4; .5 G/20ML; G/20ML
3.38 INJECTION, POWDER, LYOPHILIZED, FOR SOLUTION INTRAVENOUS EVERY 8 HOURS
Refills: 0 | Status: DISCONTINUED | OUTPATIENT
Start: 2022-12-04 | End: 2022-12-05

## 2022-12-04 RX ORDER — LANOLIN ALCOHOL/MO/W.PET/CERES
3 CREAM (GRAM) TOPICAL AT BEDTIME
Refills: 0 | Status: DISCONTINUED | OUTPATIENT
Start: 2022-12-04 | End: 2022-12-06

## 2022-12-04 RX ORDER — ASPIRIN/CALCIUM CARB/MAGNESIUM 324 MG
81 TABLET ORAL DAILY
Refills: 0 | Status: DISCONTINUED | OUTPATIENT
Start: 2022-12-04 | End: 2022-12-06

## 2022-12-04 RX ORDER — SENNA PLUS 8.6 MG/1
2 TABLET ORAL AT BEDTIME
Refills: 0 | Status: DISCONTINUED | OUTPATIENT
Start: 2022-12-04 | End: 2022-12-06

## 2022-12-04 RX ORDER — LEVOTHYROXINE SODIUM 125 MCG
100 TABLET ORAL DAILY
Refills: 0 | Status: DISCONTINUED | OUTPATIENT
Start: 2022-12-04 | End: 2022-12-06

## 2022-12-04 RX ORDER — LOSARTAN POTASSIUM 100 MG/1
50 TABLET, FILM COATED ORAL DAILY
Refills: 0 | Status: DISCONTINUED | OUTPATIENT
Start: 2022-12-04 | End: 2022-12-06

## 2022-12-04 RX ORDER — ACETAMINOPHEN 500 MG
650 TABLET ORAL EVERY 6 HOURS
Refills: 0 | Status: DISCONTINUED | OUTPATIENT
Start: 2022-12-04 | End: 2022-12-06

## 2022-12-04 RX ORDER — ENOXAPARIN SODIUM 100 MG/ML
40 INJECTION SUBCUTANEOUS EVERY 24 HOURS
Refills: 0 | Status: DISCONTINUED | OUTPATIENT
Start: 2022-12-04 | End: 2022-12-05

## 2022-12-04 RX ORDER — INFLUENZA VIRUS VACCINE 15; 15; 15; 15 UG/.5ML; UG/.5ML; UG/.5ML; UG/.5ML
0.7 SUSPENSION INTRAMUSCULAR ONCE
Refills: 0 | Status: COMPLETED | OUTPATIENT
Start: 2022-12-04 | End: 2022-12-04

## 2022-12-04 RX ORDER — ONDANSETRON 8 MG/1
4 TABLET, FILM COATED ORAL EVERY 8 HOURS
Refills: 0 | Status: DISCONTINUED | OUTPATIENT
Start: 2022-12-04 | End: 2022-12-06

## 2022-12-04 RX ORDER — PANTOPRAZOLE SODIUM 20 MG/1
40 TABLET, DELAYED RELEASE ORAL
Refills: 0 | Status: DISCONTINUED | OUTPATIENT
Start: 2022-12-04 | End: 2022-12-06

## 2022-12-04 RX ORDER — TRAMADOL HYDROCHLORIDE 50 MG/1
50 TABLET ORAL EVERY 4 HOURS
Refills: 0 | Status: DISCONTINUED | OUTPATIENT
Start: 2022-12-04 | End: 2022-12-06

## 2022-12-04 RX ORDER — VANCOMYCIN HCL 1 G
1250 VIAL (EA) INTRAVENOUS
Refills: 0 | Status: DISCONTINUED | OUTPATIENT
Start: 2022-12-05 | End: 2022-12-06

## 2022-12-04 RX ORDER — HYDROCORTISONE 20 MG
10 TABLET ORAL DAILY
Refills: 0 | Status: DISCONTINUED | OUTPATIENT
Start: 2022-12-04 | End: 2022-12-06

## 2022-12-04 RX ADMIN — PANTOPRAZOLE SODIUM 40 MILLIGRAM(S): 20 TABLET, DELAYED RELEASE ORAL at 07:28

## 2022-12-04 RX ADMIN — PIPERACILLIN AND TAZOBACTAM 25 GRAM(S): 4; .5 INJECTION, POWDER, LYOPHILIZED, FOR SOLUTION INTRAVENOUS at 14:23

## 2022-12-04 RX ADMIN — PIPERACILLIN AND TAZOBACTAM 25 GRAM(S): 4; .5 INJECTION, POWDER, LYOPHILIZED, FOR SOLUTION INTRAVENOUS at 22:04

## 2022-12-04 RX ADMIN — Medication 250 MILLIGRAM(S): at 00:44

## 2022-12-04 RX ADMIN — Medication 100 MICROGRAM(S): at 11:13

## 2022-12-04 RX ADMIN — Medication 1 APPLICATION(S): at 17:49

## 2022-12-04 RX ADMIN — Medication 81 MILLIGRAM(S): at 11:13

## 2022-12-04 RX ADMIN — Medication 10 MILLIGRAM(S): at 11:13

## 2022-12-04 RX ADMIN — LOSARTAN POTASSIUM 50 MILLIGRAM(S): 100 TABLET, FILM COATED ORAL at 11:13

## 2022-12-04 RX ADMIN — PIPERACILLIN AND TAZOBACTAM 25 GRAM(S): 4; .5 INJECTION, POWDER, LYOPHILIZED, FOR SOLUTION INTRAVENOUS at 07:28

## 2022-12-04 NOTE — PATIENT PROFILE ADULT - FUNCTIONAL ASSESSMENT - BASIC MOBILITY 6.
4-calculated by average/Not able to assess (calculate score using [facility] averaging method)  3-calculated by average/Not able to assess (calculate score using [facility] averaging method)

## 2022-12-04 NOTE — H&P ADULT - HISTORY OF PRESENT ILLNESS
This is an 86 yo F with hx of HLD, HTN, Alessandra Syndrome, hypothyroidism, Hep B presenting  due to LLE cellulitis s/p failing outpatient treatment with keflex and bactrim. Pt had recent outpatient LE duplex negative for DVT. She is on daily steroids/ immunocompromised. denies f/c. vitals unremarkable. labs significant for creat 1.8 (baseline 1.2) given vanco/zosyn in ED    denies cp, h/a, abd pain, n/v/d/c, syncope.

## 2022-12-04 NOTE — H&P ADULT - ASSESSMENT
This is an 86 yo F with hx of HLD, HTN, Alessandra Syndrome, hypothyroidism, Hep B presenting  due to LLE cellulitis s/p failing outpatient treatment with keflex and bactrim. Pt had recent outpatient LE duplex negative for DVT. She is on daily steroids/ immunocompromised. denies f/c. vitals unremarkable. labs significant for creat 1.8 (baseline 1.2) given vanco/zosyn in ED    LLE cellulitis   immunocompromised   sirisha likley pre renal   HTN   HLD   alessandra syndrome   hypothyroid   -vanco and zosyn   -cw home prednisone   -wound care consult   -hold home metolazone. monitor creat. may need a little fluid   -resume home meds  -lovenox ppx  -pt eval   -regular diet

## 2022-12-04 NOTE — PATIENT PROFILE ADULT - FALL HARM RISK - HARM RISK INTERVENTIONS

## 2022-12-04 NOTE — H&P ADULT - NSHPPHYSICALEXAM_GEN_ALL_CORE
constitutional: NAD AAOx3  HEENT: PERRLA EOMI  CV: RRR S1S2  Pulm: CTA b/l  GI: soft nontender nondistended + BS   Neuro: CN II-XII grossly intact   Musculoskeletal: L calf redness. anterior L chin abrasion. no crepitus

## 2022-12-05 LAB
ANION GAP SERPL CALC-SCNC: 3 MMOL/L — LOW (ref 5–17)
BUN SERPL-MCNC: 30 MG/DL — HIGH (ref 7–23)
CALCIUM SERPL-MCNC: 8.9 MG/DL — SIGNIFICANT CHANGE UP (ref 8.5–10.1)
CHLORIDE SERPL-SCNC: 107 MMOL/L — SIGNIFICANT CHANGE UP (ref 96–108)
CO2 SERPL-SCNC: 29 MMOL/L — SIGNIFICANT CHANGE UP (ref 22–31)
CREAT SERPL-MCNC: 1.68 MG/DL — HIGH (ref 0.5–1.3)
EGFR: 29 ML/MIN/1.73M2 — LOW
GLUCOSE SERPL-MCNC: 90 MG/DL — SIGNIFICANT CHANGE UP (ref 70–99)
HCT VFR BLD CALC: 36.3 % — SIGNIFICANT CHANGE UP (ref 34.5–45)
HGB BLD-MCNC: 11.8 G/DL — SIGNIFICANT CHANGE UP (ref 11.5–15.5)
MCHC RBC-ENTMCNC: 29.1 PG — SIGNIFICANT CHANGE UP (ref 27–34)
MCHC RBC-ENTMCNC: 32.5 G/DL — SIGNIFICANT CHANGE UP (ref 32–36)
MCV RBC AUTO: 89.6 FL — SIGNIFICANT CHANGE UP (ref 80–100)
NRBC # BLD: 0 /100 WBCS — SIGNIFICANT CHANGE UP (ref 0–0)
PLATELET # BLD AUTO: 153 K/UL — SIGNIFICANT CHANGE UP (ref 150–400)
POTASSIUM SERPL-MCNC: 4.1 MMOL/L — SIGNIFICANT CHANGE UP (ref 3.5–5.3)
POTASSIUM SERPL-SCNC: 4.1 MMOL/L — SIGNIFICANT CHANGE UP (ref 3.5–5.3)
RBC # BLD: 4.05 M/UL — SIGNIFICANT CHANGE UP (ref 3.8–5.2)
RBC # FLD: 13.6 % — SIGNIFICANT CHANGE UP (ref 10.3–14.5)
SODIUM SERPL-SCNC: 139 MMOL/L — SIGNIFICANT CHANGE UP (ref 135–145)
WBC # BLD: 6.4 K/UL — SIGNIFICANT CHANGE UP (ref 3.8–10.5)
WBC # FLD AUTO: 6.4 K/UL — SIGNIFICANT CHANGE UP (ref 3.8–10.5)

## 2022-12-05 PROCEDURE — 99233 SBSQ HOSP IP/OBS HIGH 50: CPT

## 2022-12-05 RX ORDER — CEFTRIAXONE 500 MG/1
1000 INJECTION, POWDER, FOR SOLUTION INTRAMUSCULAR; INTRAVENOUS EVERY 24 HOURS
Refills: 0 | Status: DISCONTINUED | OUTPATIENT
Start: 2022-12-05 | End: 2022-12-06

## 2022-12-05 RX ORDER — HEPARIN SODIUM 5000 [USP'U]/ML
5000 INJECTION INTRAVENOUS; SUBCUTANEOUS EVERY 8 HOURS
Refills: 0 | Status: DISCONTINUED | OUTPATIENT
Start: 2022-12-05 | End: 2022-12-06

## 2022-12-05 RX ORDER — HYDROCORTISONE 20 MG
5 TABLET ORAL AT BEDTIME
Refills: 0 | Status: DISCONTINUED | OUTPATIENT
Start: 2022-12-05 | End: 2022-12-06

## 2022-12-05 RX ADMIN — Medication 1 APPLICATION(S): at 06:05

## 2022-12-05 RX ADMIN — Medication 166.67 MILLIGRAM(S): at 11:32

## 2022-12-05 RX ADMIN — Medication 5 MILLIGRAM(S): at 22:06

## 2022-12-05 RX ADMIN — PIPERACILLIN AND TAZOBACTAM 25 GRAM(S): 4; .5 INJECTION, POWDER, LYOPHILIZED, FOR SOLUTION INTRAVENOUS at 06:05

## 2022-12-05 RX ADMIN — Medication 10 MILLIGRAM(S): at 06:05

## 2022-12-05 RX ADMIN — PANTOPRAZOLE SODIUM 40 MILLIGRAM(S): 20 TABLET, DELAYED RELEASE ORAL at 06:05

## 2022-12-05 RX ADMIN — PIPERACILLIN AND TAZOBACTAM 25 GRAM(S): 4; .5 INJECTION, POWDER, LYOPHILIZED, FOR SOLUTION INTRAVENOUS at 14:46

## 2022-12-05 RX ADMIN — CEFTRIAXONE 100 MILLIGRAM(S): 500 INJECTION, POWDER, FOR SOLUTION INTRAMUSCULAR; INTRAVENOUS at 17:39

## 2022-12-05 RX ADMIN — Medication 100 MICROGRAM(S): at 06:05

## 2022-12-05 RX ADMIN — Medication 1 APPLICATION(S): at 17:38

## 2022-12-05 RX ADMIN — Medication 81 MILLIGRAM(S): at 11:29

## 2022-12-05 RX ADMIN — LOSARTAN POTASSIUM 50 MILLIGRAM(S): 100 TABLET, FILM COATED ORAL at 06:06

## 2022-12-05 NOTE — PHYSICAL THERAPY INITIAL EVALUATION ADULT - PERTINENT HX OF CURRENT PROBLEM, REHAB EVAL
LLE swelling, pain on & off, as per pt in july, 2022 right TKR followed by cellulitis in RLE, now in LLE

## 2022-12-05 NOTE — PROGRESS NOTE ADULT - ASSESSMENT
This is an 86 yo F with hx of HLD, HTN, Alessandra Syndrome, hypothyroidism, Hep B presenting  due to LLE cellulitis s/p failing outpatient treatment with keflex and bactrim. Pt had recent outpatient LE duplex negative for DVT. She is on daily steroids/ immunocompromised. denies f/c. vitals unremarkable. labs significant for creat 1.8 (baseline 1.2) given vanco/zosyn in ED    LLE cellulitis   immunocompromised   sirisha likley pre renal   HTN   HLD   alessandra syndrome   hypothyroid   -vanco and zosyn   (12/5) Clinically appears the same. WBC stable. Transition to PO abx in next day or two depending on clinical course.   -cw home prednisone   -wound care consult   -hold home metolazone. monitor creat.  -resume home meds  -lovenox ppx  -pt eval: outpatient PT  -regular diet

## 2022-12-05 NOTE — PHYSICAL THERAPY INITIAL EVALUATION ADULT - DID THE PATIENT HAVE SURGERY?
LLE Cellulitis, PMH: Alessandra syndrome, no fracture left tibis, fibula, no LLE DVT( outpatient doppler),h/o right TKR in July 2022 with Dr. Jones/n/a

## 2022-12-05 NOTE — PROGRESS NOTE ADULT - SUBJECTIVE AND OBJECTIVE BOX
Patient is a 87y old  Female who presents with a chief complaint of   INTERVAL HPI/OVERNIGHT EVENTS: Patients seen and examined at bedside this morning. No acute events overnight. Pt reports pain to touch and swelling to left foot.    MEDICATIONS  (STANDING):  aspirin enteric coated 81 milliGRAM(s) Oral daily  heparin   Injectable 5000 Unit(s) SubCutaneous every 8 hours  hydrochlorothiazide 12.5 milliGRAM(s) Oral daily  hydrocortisone 10 milliGRAM(s) Oral daily  hydrocortisone 5 milliGRAM(s) Oral at bedtime  influenza  Vaccine (HIGH DOSE) 0.7 milliLiter(s) IntraMuscular once  levothyroxine 100 MICROGram(s) Oral daily  losartan 50 milliGRAM(s) Oral daily  pantoprazole    Tablet 40 milliGRAM(s) Oral before breakfast  piperacillin/tazobactam IVPB.. 3.375 Gram(s) IV Intermittent every 8 hours  senna 2 Tablet(s) Oral at bedtime  triamcinolone 0.1% Ointment 1 Application(s) Topical every 12 hours  vancomycin  IVPB 1250 milliGRAM(s) IV Intermittent every 48 hours    MEDICATIONS  (PRN):  acetaminophen     Tablet .. 650 milliGRAM(s) Oral every 6 hours PRN Temp greater or equal to 38C (100.4F), Mild Pain (1 - 3)  aluminum hydroxide/magnesium hydroxide/simethicone Suspension 30 milliLiter(s) Oral every 4 hours PRN Dyspepsia  melatonin 3 milliGRAM(s) Oral at bedtime PRN Insomnia  ondansetron Injectable 4 milliGRAM(s) IV Push every 8 hours PRN Nausea and/or Vomiting  traMADol 50 milliGRAM(s) Oral every 4 hours PRN Moderate Pain (4 - 6)    Allergies    codeine (Headache)  doxycycline (Hives)  scallops (Nausea)  shellfish (Nausea)    Intolerances      REVIEW OF SYSTEMS:  All other systems reviewed and are negative    Vital Signs Last 24 Hrs  T(C): 36.3 (05 Dec 2022 05:17), Max: 36.4 (05 Dec 2022 00:27)  T(F): 97.3 (05 Dec 2022 05:17), Max: 97.6 (05 Dec 2022 00:27)  HR: 52 (05 Dec 2022 05:17) (51 - 55)  BP: 124/66 (05 Dec 2022 05:17) (106/49 - 124/66)  BP(mean): --  RR: 17 (05 Dec 2022 05:17) (17 - 17)  SpO2: 94% (05 Dec 2022 05:17) (94% - 96%)      Daily     Daily   I&O's Summary    04 Dec 2022 07:01  -  05 Dec 2022 07:00  --------------------------------------------------------  IN: 320 mL / OUT: 0 mL / NET: 320 mL      CAPILLARY BLOOD GLUCOSE        PHYSICAL EXAM:  GENERAL: NAD, age appropriate  HEAD:  Atraumatic, Normocephalic  EYES: EOMI, PERRLA, conjunctiva and sclera clear  ENMT: No tonsillar erythema, exudates, or enlargement; Moist mucous membranes, Good dentition, No lesions  NECK: Supple, No JVD, Normal thyroid  NERVOUS SYSTEM:  Alert & Oriented X3, Good concentration; Motor Strength 5/5 B/L upper and lower extremities; DTRs 2+ intact and symmetric  CHEST/LUNG: Clear to percussion bilaterally; No rales, rhonchi, wheezing, or rubs  HEART: Regular rate and rhythm; No murmurs, rubs, or gallops  ABDOMEN: Soft, Nontender, Nondistended; Bowel sounds present  EXTREMITIES:  2+ Peripheral Pulses, 2+ left LE edema to midshin   LYMPH: No lymphadenopathy noted  SKIN: erythema to left LE to below knee    Labs                          11.8   6.40  )-----------( 153      ( 05 Dec 2022 06:22 )             36.3     12-05    139  |  107  |  30<H>  ----------------------------<  90  4.1   |  29  |  1.68<H>    Ca    8.9      05 Dec 2022 06:22    TPro  7.4  /  Alb  3.8  /  TBili  0.4  /  DBili  x   /  AST  42<H>  /  ALT  23  /  AlkPhos  69  12-03                            Radiology and Imaging reviewed.

## 2022-12-05 NOTE — PHYSICAL THERAPY INITIAL EVALUATION ADULT - ADDITIONAL COMMENTS
As per pt, she lives alone in a co-op, with 1 step to enter with b/l rail. no other steps. She has a walk in shower, uses 3:1 commode as shower chair, has comfort height toilet seat, grab bars, she was independent in all functional mobility using no AD, indoors, uses cane outdoors, Goes to outpatient PT after Right TKR in July 2022 with Dr. Jones. She drives for shopping, PT

## 2022-12-05 NOTE — PHYSICAL THERAPY INITIAL EVALUATION ADULT - MODALITIES TREATMENT COMMENTS
LLE posterior lower leg(just above left medial malleolus)-0.7x0.5x0 no discharge except very little dried blood, no discharge, cleaned with sterile saline soaked gauze, cavilon applied to periwound area, left open.

## 2022-12-06 ENCOUNTER — APPOINTMENT (OUTPATIENT)
Dept: ORTHOPEDIC SURGERY | Facility: CLINIC | Age: 87
End: 2022-12-06

## 2022-12-06 ENCOUNTER — TRANSCRIPTION ENCOUNTER (OUTPATIENT)
Age: 87
End: 2022-12-06

## 2022-12-06 VITALS
OXYGEN SATURATION: 94 % | HEART RATE: 57 BPM | TEMPERATURE: 98 F | SYSTOLIC BLOOD PRESSURE: 132 MMHG | DIASTOLIC BLOOD PRESSURE: 70 MMHG | RESPIRATION RATE: 17 BRPM

## 2022-12-06 LAB
ANION GAP SERPL CALC-SCNC: 5 MMOL/L — SIGNIFICANT CHANGE UP (ref 5–17)
BUN SERPL-MCNC: 31 MG/DL — HIGH (ref 7–23)
CALCIUM SERPL-MCNC: 9.1 MG/DL — SIGNIFICANT CHANGE UP (ref 8.5–10.1)
CHLORIDE SERPL-SCNC: 106 MMOL/L — SIGNIFICANT CHANGE UP (ref 96–108)
CO2 SERPL-SCNC: 27 MMOL/L — SIGNIFICANT CHANGE UP (ref 22–31)
CREAT SERPL-MCNC: 1.59 MG/DL — HIGH (ref 0.5–1.3)
EGFR: 31 ML/MIN/1.73M2 — LOW
GLUCOSE SERPL-MCNC: 89 MG/DL — SIGNIFICANT CHANGE UP (ref 70–99)
HCT VFR BLD CALC: 35.8 % — SIGNIFICANT CHANGE UP (ref 34.5–45)
HGB BLD-MCNC: 12 G/DL — SIGNIFICANT CHANGE UP (ref 11.5–15.5)
MCHC RBC-ENTMCNC: 29.6 PG — SIGNIFICANT CHANGE UP (ref 27–34)
MCHC RBC-ENTMCNC: 33.5 G/DL — SIGNIFICANT CHANGE UP (ref 32–36)
MCV RBC AUTO: 88.2 FL — SIGNIFICANT CHANGE UP (ref 80–100)
NRBC # BLD: 0 /100 WBCS — SIGNIFICANT CHANGE UP (ref 0–0)
PLATELET # BLD AUTO: 171 K/UL — SIGNIFICANT CHANGE UP (ref 150–400)
POTASSIUM SERPL-MCNC: 3.9 MMOL/L — SIGNIFICANT CHANGE UP (ref 3.5–5.3)
POTASSIUM SERPL-SCNC: 3.9 MMOL/L — SIGNIFICANT CHANGE UP (ref 3.5–5.3)
RBC # BLD: 4.06 M/UL — SIGNIFICANT CHANGE UP (ref 3.8–5.2)
RBC # FLD: 13.3 % — SIGNIFICANT CHANGE UP (ref 10.3–14.5)
SODIUM SERPL-SCNC: 138 MMOL/L — SIGNIFICANT CHANGE UP (ref 135–145)
WBC # BLD: 7.33 K/UL — SIGNIFICANT CHANGE UP (ref 3.8–10.5)
WBC # FLD AUTO: 7.33 K/UL — SIGNIFICANT CHANGE UP (ref 3.8–10.5)

## 2022-12-06 PROCEDURE — 99239 HOSP IP/OBS DSCHRG MGMT >30: CPT

## 2022-12-06 RX ORDER — LOSARTAN POTASSIUM 100 MG/1
1 TABLET, FILM COATED ORAL
Qty: 30 | Refills: 0
Start: 2022-12-06 | End: 2023-01-04

## 2022-12-06 RX ORDER — HYDROCORTISONE 20 MG
1 TABLET ORAL
Qty: 0 | Refills: 0 | DISCHARGE
Start: 2022-12-06

## 2022-12-06 RX ORDER — LOSARTAN/HYDROCHLOROTHIAZIDE 100MG-25MG
1 TABLET ORAL
Qty: 0 | Refills: 0 | DISCHARGE

## 2022-12-06 RX ORDER — METOLAZONE 5 MG/1
5 TABLET ORAL
Qty: 75 | Refills: 0
Start: 2022-12-06 | End: 2023-01-04

## 2022-12-06 RX ORDER — METOLAZONE 5 MG/1
5 TABLET ORAL
Qty: 0 | Refills: 0 | DISCHARGE

## 2022-12-06 RX ORDER — HYDROCHLOROTHIAZIDE 25 MG
1 TABLET ORAL
Qty: 0 | Refills: 0 | DISCHARGE
Start: 2022-12-06

## 2022-12-06 RX ADMIN — Medication 10 MILLIGRAM(S): at 05:55

## 2022-12-06 RX ADMIN — Medication 100 MICROGRAM(S): at 05:55

## 2022-12-06 RX ADMIN — LOSARTAN POTASSIUM 50 MILLIGRAM(S): 100 TABLET, FILM COATED ORAL at 05:56

## 2022-12-06 RX ADMIN — Medication 1 APPLICATION(S): at 05:56

## 2022-12-06 RX ADMIN — PANTOPRAZOLE SODIUM 40 MILLIGRAM(S): 20 TABLET, DELAYED RELEASE ORAL at 05:55

## 2022-12-06 RX ADMIN — Medication 81 MILLIGRAM(S): at 11:14

## 2022-12-06 RX ADMIN — SENNA PLUS 2 TABLET(S): 8.6 TABLET ORAL at 00:50

## 2022-12-06 NOTE — DISCHARGE NOTE PROVIDER - HOSPITAL COURSE
This is an 86 yo F with hx of HLD, HTN, Alessandra Syndrome, hypothyroidism, Hep B presenting  due to LLE cellulitis s/p failing outpatient treatment with keflex and bactrim. Pt had recent outpatient LE duplex negative for DVT. She is on daily steroids/ immunocompromised. denies f/c. vitals unremarkable. labs significant for creat 1.8 (baseline 1.2) given vanco/zosyn in ED. Patient has improved on abx. Medically stable for discharge home with outpatient follow up.     LLE cellulitis   immunocompromised   sirisha likley pre renal   HTN   HLD   alessandra syndrome   hypothyroid   -vanco and zosyn   (12/5) Clinically appears the same. WBC stable. Transition to PO abx in next day or two depending on clinical course.   (12/6) Clinically improved. Will change abx to Bactrim DS BID to complete course of 5 days.   -cw home prednisone   -wound care consult   -hold home metolazone. monitor creat.  -resume home meds  -lovenox ppx  -pt eval: outpatient PT  -regular diet     Problem/Plan - 1:  ·  Problem: Left leg cellulitis.     Problem/Plan - 2:  ·  Problem: Immunocompromised status associated with infection.     Problem/Plan - 3:  ·  Problem: SIRISHA (acute kidney injury).     Problem/Plan - 4:  ·  Problem: HTN (hypertension).     Problem/Plan - 5:  ·  Problem: HLD (hyperlipidemia).     Problem/Plan - 6:  ·  Problem: Alessandra syndrome.     Problem/Plan - 7:  ·  Problem: Hypothyroidism.

## 2022-12-06 NOTE — DISCHARGE NOTE PROVIDER - ATTENDING DISCHARGE PHYSICAL EXAMINATION:
GENERAL: NAD, age appropriate  HEAD:  Atraumatic, Normocephalic  EYES: EOMI, PERRLA, conjunctiva and sclera clear  ENMT: No tonsillar erythema, exudates, or enlargement; Moist mucous membranes, Good dentition, No lesions  NECK: Supple, No JVD, Normal thyroid  NERVOUS SYSTEM:  Alert & Oriented X3, Good concentration; Motor Strength 5/5 B/L upper and lower extremities; DTRs 2+ intact and symmetric  CHEST/LUNG: Clear to percussion bilaterally; No rales, rhonchi, wheezing, or rubs  HEART: Regular rate and rhythm; No murmurs, rubs, or gallops  ABDOMEN: Soft, Nontender, Nondistended; Bowel sounds present  EXTREMITIES:  2+ Peripheral Pulses, 2+ left LE edema to midshin   LYMPH: No lymphadenopathy noted  SKIN: erythema to left LE to below knee improving

## 2022-12-06 NOTE — DISCHARGE NOTE NURSING/CASE MANAGEMENT/SOCIAL WORK - PATIENT PORTAL LINK FT
You can access the FollowMyHealth Patient Portal offered by [facility] by registering at the following website: [email or web address]. By joining [employer]’s FollowMyHealth portal, you will also be able to view your health information using other applications (apps) compatible with our system.

## 2022-12-06 NOTE — DISCHARGE NOTE NURSING/CASE MANAGEMENT/SOCIAL WORK - NSDCPEFALRISK_GEN_ALL_CORE
For information on Fall & Injury Prevention, visit: [email or web address] OR  [email or web address] OR  https://www.cdc.gov/steadi/patient.html

## 2022-12-06 NOTE — DISCHARGE NOTE PROVIDER - NSDCCPCAREPLAN_GEN_ALL_CORE_FT
PRINCIPAL DISCHARGE DIAGNOSIS  Diagnosis: Cellulitis of left leg  Assessment and Plan of Treatment:

## 2022-12-06 NOTE — DISCHARGE NOTE PROVIDER - NSDCMRMEDTOKEN_GEN_ALL_CORE_FT
ascorbic acid 500 mg oral tablet: 1 tab(s) orally once a day  Aspirin Enteric Coated 81 mg oral delayed release tablet: 1 tab(s) orally 2 times a day MDD:2  Bactrim  mg-160 mg oral tablet: 1 tab(s) orally 2 times a day   hydroCHLOROthiazide 12.5 mg oral capsule: 1 cap(s) orally once a day  hydrocortisone 10 mg oral tablet: 1 tab(s) orally once a day  hydrocortisone 5 mg oral tablet: 1 tab(s) orally once a day (at bedtime)  levothyroxine 100 mcg (0.1 mg) oral tablet: 1 tab(s) orally once a day  losartan 50 mg oral tablet: 1 tab(s) orally once a day  metOLazone 5 mg oral tablet: 5 milligram(s) orally every other day  Multiple Vitamins oral tablet: 1 tab(s) orally once a day  pantoprazole 40 mg oral delayed release tablet: 1 tab(s) orally once a day (before a meal)  Repatha 140 mg/mL subcutaneous solution: subcutaneous 2 times a month  senna leaf extract oral tablet: 2 tab(s) orally once a day (at bedtime)  traMADol 50 mg oral tablet: 1 tab(s) orally every 4 hours, As Needed -Mild Pain (5-10) May take 1/2 tab for lesser pain MDD:8 tablets (400 mg/day)    Do not exceed 8 tabs/day.   triamcinolone 0.1% topical ointment: 1 application topically 2 times a day

## 2022-12-06 NOTE — DISCHARGE NOTE PROVIDER - NSDCFUSCHEDAPPT_GEN_ALL_CORE_FT
[facility] Physician [facility]  [address]  Scheduled Appointment: 12/06/2022

## 2022-12-10 DIAGNOSIS — Z96.651 PRESENCE OF RIGHT ARTIFICIAL KNEE JOINT: ICD-10-CM

## 2022-12-10 DIAGNOSIS — R73.03 PREDIABETES: ICD-10-CM

## 2022-12-10 DIAGNOSIS — E03.9 HYPOTHYROIDISM, UNSPECIFIED: ICD-10-CM

## 2022-12-10 DIAGNOSIS — D84.9 IMMUNODEFICIENCY, UNSPECIFIED: ICD-10-CM

## 2022-12-10 DIAGNOSIS — I10 ESSENTIAL (PRIMARY) HYPERTENSION: ICD-10-CM

## 2022-12-10 DIAGNOSIS — E78.5 HYPERLIPIDEMIA, UNSPECIFIED: ICD-10-CM

## 2022-12-10 DIAGNOSIS — Z28.310 UNVACCINATED FOR COVID-19: ICD-10-CM

## 2022-12-10 DIAGNOSIS — L03.116 CELLULITIS OF LEFT LOWER LIMB: ICD-10-CM

## 2022-12-10 DIAGNOSIS — Z79.82 LONG TERM (CURRENT) USE OF ASPIRIN: ICD-10-CM

## 2022-12-10 DIAGNOSIS — N17.9 ACUTE KIDNEY FAILURE, UNSPECIFIED: ICD-10-CM

## 2022-12-10 DIAGNOSIS — E23.0 HYPOPITUITARISM: ICD-10-CM

## 2022-12-20 ENCOUNTER — APPOINTMENT (OUTPATIENT)
Dept: ORTHOPEDIC SURGERY | Facility: CLINIC | Age: 87
End: 2022-12-20

## 2022-12-20 PROCEDURE — 99214 OFFICE O/P EST MOD 30 MIN: CPT

## 2022-12-20 NOTE — DISCUSSION/SUMMARY
[de-identified] : The patient was advised of the diagnosis.  The natural history of the pathology was explained in full to the patient in layman's terms. All questions were answered.  The risks and benefits of surgical and non-surgical treatment alternatives were explained in full to the patient.\par

## 2022-12-20 NOTE — HISTORY OF PRESENT ILLNESS
[Dull/Aching] : dull/aching [Intermittent] : intermittent [de-identified] : 12/20/22: Cont difficulty with balance.\par \par Prev doc:\par 3/14/22: Pain in the right knee for multiple years that has gotten progressively worse. She has tried HA injections 2x, the first one helped for about 3 worse but her most recent series provided no benefit. She has done PT with slight benefit. She also tired CSI that lasted for 3 days\par 7/26/22: 3 weeks postop R TKA having fevers/chills and has a cough and sinus blockage. Had a recent episode up of cellulitis and went to the ED. Having swelling and pain in the knee as well. Is currently taking Abx. \par 8/30/22: 2 months postop - improving.  No fevers/chills.  Cellulitis resolved.  Going to PT.\par 11/8/22: Cont post knee pain and calf pain.  Going to PT, still feels she limps when she walks same as preop. [] : no [FreeTextEntry1] : right knee  [FreeTextEntry5] : PARISH is here today for right knee pain. pt states since last visit, pain and swelling decreased.

## 2022-12-20 NOTE — PHYSICAL EXAM
[de-identified] : Right knee: Inc healed.  No effusion.  No redness.  ROM 0-120.  Cane.  Post knee pain at end of flexion.  Mild trendelenberg gait.\par \par Lspine: Right paraspinal tenderness.  Neg SLR.  NVI.

## 2022-12-20 NOTE — ASSESSMENT
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 She has failed all forms of conservative treatment and is ready to proceed with TKA. Risks and benefits discussed. Hx of bleeding on Aspirin in her gums. Has a wedding in June and will plan for TKA after this. 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 IS on steroids 15 mg daily\par 7/26/22: 3 weeks postop had a recent episode of cellulitis and has been taking Abx. Having fevers/chills postop and will follow up with DAQUAN Lorenzo at the end of this week if this continues. may need readmission for more IV abx - no sign of knee infection -  Advised to follow up with PCP. \par 8/30/22: 2 months postop doing very well, cellulitis resolved, no signs of infection.  Cont PT.  May return tp anam/pilates when she feels able.\par 10/7/22: Right TKA doing well, still some swelling in leg but significant improvement and normal for course.  Acute onset sciatic pain in right leg started last night - will try NSAIDs for this and if no improvement will get further workup.\par 11/8/22: Right knee has good function, no signs of infection.  Mult neg dopplers to this point.  Has some trendelenberg gait - will work on gait training at PT as this is her biggest complaint at this time.\par \par 12/20/22: Knee doing well but persistent trendelenberg gait and weakness - MRI rigth hip eval abd tendon tear.

## 2022-12-26 ENCOUNTER — FORM ENCOUNTER (OUTPATIENT)
Age: 87
End: 2022-12-26

## 2022-12-27 ENCOUNTER — APPOINTMENT (OUTPATIENT)
Dept: MRI IMAGING | Facility: CLINIC | Age: 87
End: 2022-12-27

## 2022-12-27 PROCEDURE — 73721 MRI JNT OF LWR EXTRE W/O DYE: CPT | Mod: RT

## 2023-01-10 ENCOUNTER — APPOINTMENT (OUTPATIENT)
Dept: ORTHOPEDIC SURGERY | Facility: CLINIC | Age: 88
End: 2023-01-10
Payer: MEDICARE

## 2023-01-10 VITALS — BODY MASS INDEX: 33.99 KG/M2 | HEIGHT: 61 IN | WEIGHT: 180 LBS

## 2023-01-10 PROCEDURE — J3490M: CUSTOM | Mod: RT

## 2023-01-10 PROCEDURE — 76942 ECHO GUIDE FOR BIOPSY: CPT | Mod: RT

## 2023-01-10 PROCEDURE — 20551 NJX 1 TENDON ORIGIN/INSJ: CPT | Mod: RT

## 2023-01-10 PROCEDURE — 99214 OFFICE O/P EST MOD 30 MIN: CPT | Mod: 25

## 2023-01-10 NOTE — DISCUSSION/SUMMARY
[de-identified] : The patient was advised of the diagnosis.  The natural history of the pathology was explained in full to the patient in layman's terms. All questions were answered.  The risks and benefits of surgical and non-surgical treatment alternatives were explained in full to the patient.\par

## 2023-01-10 NOTE — PROCEDURE
[Tendon Sheath] : tendon sheath [Right] : of the right [Greater Trochanteric Bursa] : greater trochanteric bursa [Pain] : pain [Inflammation] : inflammation [Betadine] : betadine [Ethyl Chloride sprayed topically] : ethyl chloride sprayed topically [Sterile technique used] : sterile technique used [___ cc    6mg] :  Betamethasone (Celestone) ~Vcc of 6mg [___ cc    1%] : Lidocaine ~Vcc of 1%  [___ cc    0.25%] : Bupivacaine (Marcaine) ~Vcc of 0.25%  [] : Patient tolerated procedure well [Call if redness, pain or fever occur] : call if redness, pain or fever occur [Apply ice for 15min out of every hour for the next 12-24 hours as tolerated] : apply ice for 15 minutes out of every hour for the next 12-24 hours as tolerated [Previous OTC use and PT nontherapeutic] : patient has tried OTC's including aspirin, Ibuprofen, Aleve, etc or prescription NSAIDS, and/or exercises at home and/or physical therapy without satisfactory response [Risks, benefits, alternatives discussed / Verbal consent obtained] : the risks benefits, and alternatives have been discussed, and verbal consent was obtained [All ultrasound images have been permanently captured and stored accordingly in our picture archiving and communication system] : All ultrasound images have been permanently captured and stored accordingly in our picture archiving and communication system [Visualization of the needle and placement of injection was performed without complication] : visualization of the needle and placement of injection was performed without complication

## 2023-01-10 NOTE — HISTORY OF PRESENT ILLNESS
[3] : 3 [0] : 0 [Dull/Aching] : dull/aching [de-identified] : 1/10/23: F/U MRI right hip, cont lateral hip pain.\par \par Prev doc:\par 3/14/22: Pain in the right knee for multiple years that has gotten progressively worse. She has tried HA injections 2x, the first one helped for about 3 worse but her most recent series provided no benefit. She has done PT with slight benefit. She also tired CSI that lasted for 3 days\par 7/26/22: 3 weeks postop R TKA having fevers/chills and has a cough and sinus blockage. Had a recent episode up of cellulitis and went to the ED. Having swelling and pain in the knee as well. Is currently taking Abx. \par 8/30/22: 2 months postop - improving.  No fevers/chills.  Cellulitis resolved.  Going to PT.\par 11/8/22: Cont post knee pain and calf pain.  Going to PT, still feels she limps when she walks same as preop.\par 12/20/22: Cont difficulty with balance. [] : no [FreeTextEntry1] : Right hip [FreeTextEntry5] : PARISH WARD is a 87 year old F here for a follow up for right hip MRI results. [de-identified] : MRI

## 2023-01-10 NOTE — PHYSICAL EXAM
[de-identified] : Right knee: Inc healed.  No effusion.  No redness.  ROM 0-120.  Cane.  Post knee pain at end of flexion.  Mild trendelenberg gait.\par \par Lspine: Right paraspinal tenderness.  Neg SLR.  NVI.\par \par RIght hip: Limited IR.  No pain with hip rotation.  Greater troch tenderness.

## 2023-02-14 ENCOUNTER — APPOINTMENT (OUTPATIENT)
Dept: ORTHOPEDIC SURGERY | Facility: CLINIC | Age: 88
End: 2023-02-14
Payer: MEDICARE

## 2023-02-14 VITALS — BODY MASS INDEX: 33.99 KG/M2 | HEIGHT: 61 IN | WEIGHT: 180 LBS

## 2023-02-14 DIAGNOSIS — M70.61 TROCHANTERIC BURSITIS, RIGHT HIP: ICD-10-CM

## 2023-02-14 PROCEDURE — 99213 OFFICE O/P EST LOW 20 MIN: CPT

## 2023-02-14 NOTE — DISCUSSION/SUMMARY
[de-identified] : The patient was advised of the diagnosis.  The natural history of the pathology was explained in full to the patient in layman's terms. All questions were answered.  The risks and benefits of surgical and non-surgical treatment alternatives were explained in full to the patient.\par

## 2023-02-14 NOTE — ASSESSMENT
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 She has failed all forms of conservative treatment and is ready to proceed with TKA. Risks and benefits discussed. Hx of bleeding on Aspirin in her gums. Has a wedding in June and will plan for TKA after this. 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 IS on steroids 15 mg daily\par 7/26/22: 3 weeks postop had a recent episode of cellulitis and has been taking Abx. Having fevers/chills postop and will follow up with DAQUAN Lorenzo at the end of this week if this continues. may need readmission for more IV abx - no sign of knee infection -  Advised to follow up with PCP. \par 8/30/22: 2 months postop doing very well, cellulitis resolved, no signs of infection.  Cont PT.  May return tp anam/pilates when she feels able.\par 10/7/22: Right TKA doing well, still some swelling in leg but significant improvement and normal for course.  Acute onset sciatic pain in right leg started last night - will try NSAIDs for this and if no improvement will get further workup.\par 11/8/22: Right knee has good function, no signs of infection.  Mult neg dopplers to this point.  Has some trendelenberg gait - will work on gait training at PT as this is her biggest complaint at this time.\par 12/20/22: Knee doing well but persistent trendelenberg gait and weakness - MRI rigth hip eval abd tendon tear.\par 1/10/23: MRI right hip neg for abd tear - has significant OA but no groin pain - all lateral tenderness.  Bursa inj today and PT, reeval in 6 weeks.  Knee is improving.\par \par 2/14/23: Knee is improving, still with ltareal hip pain unrelieved by PT and bursa inj.  Has significnat Lspine DDD - she had MRI done last year and has images at home, will f/u with pain management and bring disc with her.  She feels her back is the most severe of all of her issues at this point.

## 2023-02-14 NOTE — PHYSICAL EXAM
[de-identified] : Right knee: Inc healed.  No effusion.  No redness.  ROM 0-120.  Cane.  Post knee pain at end of flexion.  Mild trendelenberg gait.\par \par Lspine: Right paraspinal tenderness.  Neg SLR.  NVI.\par \par RIght hip: Limited IR.  No pain with hip rotation.  Greater troch tenderness.

## 2023-02-14 NOTE — HISTORY OF PRESENT ILLNESS
[9] : 9 [4] : 4 [Localized] : localized [Sharp] : sharp [de-identified] : 2/14/23: Minimal relief from bursa inj and PT.\par \par Prev doc:\par 3/14/22: Pain in the right knee for multiple years that has gotten progressively worse. She has tried HA injections 2x, the first one helped for about 3 worse but her most recent series provided no benefit. She has done PT with slight benefit. She also tired CSI that lasted for 3 days\par 7/26/22: 3 weeks postop R TKA having fevers/chills and has a cough and sinus blockage. Had a recent episode up of cellulitis and went to the ED. Having swelling and pain in the knee as well. Is currently taking Abx. \par 8/30/22: 2 months postop - improving.  No fevers/chills.  Cellulitis resolved.  Going to PT.\par 11/8/22: Cont post knee pain and calf pain.  Going to PT, still feels she limps when she walks same as preop.\par 12/20/22: Cont difficulty with balance.\par 1/10/23: F/U MRI right hip, cont lateral hip pain. [] : no [FreeTextEntry1] : L spine [FreeTextEntry5] : FU L spine. Pt states she thought the pain as from her knee or hip but she states that the pain is solely in her L spine. Pt states R knee is only stuff sometimes but the pain originates in the back.  [de-identified] : PT 2x a week

## 2023-02-23 ENCOUNTER — APPOINTMENT (OUTPATIENT)
Dept: PAIN MANAGEMENT | Facility: CLINIC | Age: 88
End: 2023-02-23
Payer: MEDICARE

## 2023-02-23 VITALS — BODY MASS INDEX: 34.55 KG/M2 | HEIGHT: 61 IN | WEIGHT: 183 LBS

## 2023-02-23 PROCEDURE — 99204 OFFICE O/P NEW MOD 45 MIN: CPT

## 2023-02-23 NOTE — PHYSICAL EXAM
[Normal Coordination] : normal coordination [Normal DTR UE/LE] : normal DTR UE/LE  [Normal Sensation] : normal sensation [Normal Mood and Affect] : normal mood and affect [Able to Communicate] : able to communicate [Normal Skin] : normal skin [No Rash] : no rash [No Ulcers] : no ulcers [No Lesions] : no lesions [No obvious lymphadenopathy in areas examined] : no obvious lymphadenopathy in areas examined [Well Developed] : well developed [Well Nourished] : well nourished [No Respiratory Distress] : no respiratory distress [] : ambulation with cane

## 2023-02-23 NOTE — HISTORY OF PRESENT ILLNESS
[Lower back] : lower back [Sharp] : sharp [Shooting] : shooting [Occasional] : occasional [Leisure] : leisure [Rest] : rest [Bending/leaning forward] : bending/leaning forward [Sitting] : sitting [Standing] : standing [FreeTextEntry1] : Back pain with radiation to RLE> Pain began approx. 1 year ago worsening over the last few months and complicating her recovery from a R TKR. Denies groin pain, no bowel/bladder symptoms. Prior episodes treated with MDP no lumbar epidurals or surgery. No recent MRI [] : no

## 2023-02-23 NOTE — DATA REVIEWED
[MRI] : MRI [Thoracic Spine] : thoracic spine [Report was reviewed and noted in the chart] : The report was reviewed and noted in the chart

## 2023-02-23 NOTE — DISCUSSION/SUMMARY
[de-identified] : Followup after MRI, patient states she has had recent imaging not here for review, she will fax to [facility] luis for review.

## 2023-02-23 NOTE — CONSULT LETTER
[Dear  ___] : Dear  [unfilled], [Consult Letter:] : I had the pleasure of evaluating your patient, [unfilled]. [Please see my note below.] : Please see my note below. [Consult Closing:] : Thank you very much for allowing me to participate in the care of this patient.  If you have any questions, please do not hesitate to contact me. [Sincerely,] : Sincerely, [FreeTextEntry3] : Orlando Bennett MD\par

## 2023-03-22 ENCOUNTER — FORM ENCOUNTER (OUTPATIENT)
Age: 88
End: 2023-03-22

## 2023-03-23 ENCOUNTER — APPOINTMENT (OUTPATIENT)
Dept: MRI IMAGING | Facility: CLINIC | Age: 88
End: 2023-03-23
Payer: MEDICARE

## 2023-03-23 PROCEDURE — 72148 MRI LUMBAR SPINE W/O DYE: CPT

## 2023-03-30 ENCOUNTER — APPOINTMENT (OUTPATIENT)
Dept: PAIN MANAGEMENT | Facility: CLINIC | Age: 88
End: 2023-03-30
Payer: MEDICARE

## 2023-03-30 VITALS — BODY MASS INDEX: 34.93 KG/M2 | WEIGHT: 185 LBS | HEIGHT: 61 IN

## 2023-03-30 PROCEDURE — 99214 OFFICE O/P EST MOD 30 MIN: CPT

## 2023-03-30 NOTE — HISTORY OF PRESENT ILLNESS
[Lower back] : lower back [7] : 7 [3] : 3 [Sharp] : sharp [Shooting] : shooting [Occasional] : occasional [Leisure] : leisure [Rest] : rest [Bending/leaning forward] : bending/leaning forward [Sitting] : sitting [Standing] : standing [FreeTextEntry1] : 3/30/23 \par MRI  FU \par \par \par Back pain with radiation to RLE> Pain began approx. 1 year ago worsening over the last few months and complicating her recovery from a R TKR. Denies groin pain, no bowel/bladder symptoms. Prior episodes treated with MDP no lumbar epidurals or surgery. No recent MRI [] : no [de-identified] : mri

## 2023-03-30 NOTE — PHYSICAL EXAM
[Normal Coordination] : normal coordination [Normal DTR UE/LE] : normal DTR UE/LE  [Normal Sensation] : normal sensation [Normal Mood and Affect] : normal mood and affect [Able to Communicate] : able to communicate [Normal Skin] : normal skin [No Rash] : no rash [No Ulcers] : no ulcers [No Lesions] : no lesions [No obvious lymphadenopathy in areas examined] : no obvious lymphadenopathy in areas examined [Well Developed] : well developed [Well Nourished] : well nourished [No Respiratory Distress] : no respiratory distress [] : antalgic

## 2023-03-30 NOTE — DISCUSSION/SUMMARY
[Surgical risks reviewed] : Surgical risks reviewed [de-identified] : After discussing various treatment options with the patient including but not limited to oral medications, physical therapy, exercise,\par modalities as well as interventional spinal injections, we have decided with the following plan\par \par I personally reviewed the MRI/CT scan images and agree with the radiologist's report. The\par radiological findings were discussed with the patient.\par \par \par The risks, benefits, contents and alternatives to injection were explained in full to the patient. Risks outlined include but are not\par limited to infection,sepsis, bleeding, post-dural puncture headache, nerve damage, temporary increase in pain, syncopal episode,\par failure to resolve symptoms, allergic reaction, symptom recurrence, and elevation of blood sugar in diabetics. Cortisone may cause\par immunosuppression. Patient understands the risks. All questions were answered. After discussion of options, patient requested\par an injection. Information regarding the injection was given to the patient. Which medications to stop prior to the injection was\par explained to the patient as well.\par \par Follow up in 1-2 weeks post injection for re-evaluation.\par \par  Conservative Care\par Continue Home exercises, stretching, activity modification, physical therapy, and conservative care.\par \par Proceed with L5-S1 LESI\par

## 2023-04-10 ENCOUNTER — APPOINTMENT (OUTPATIENT)
Age: 88
End: 2023-04-10
Payer: MEDICARE

## 2023-04-10 PROCEDURE — 62323 NJX INTERLAMINAR LMBR/SAC: CPT

## 2023-04-27 ENCOUNTER — APPOINTMENT (OUTPATIENT)
Dept: PAIN MANAGEMENT | Facility: CLINIC | Age: 88
End: 2023-04-27
Payer: MEDICARE

## 2023-04-27 VITALS — WEIGHT: 186 LBS | BODY MASS INDEX: 35.12 KG/M2 | HEIGHT: 61 IN

## 2023-04-27 DIAGNOSIS — M54.50 LOW BACK PAIN, UNSPECIFIED: ICD-10-CM

## 2023-04-27 DIAGNOSIS — G89.29 LOW BACK PAIN, UNSPECIFIED: ICD-10-CM

## 2023-04-27 PROCEDURE — 99214 OFFICE O/P EST MOD 30 MIN: CPT

## 2023-04-27 NOTE — HISTORY OF PRESENT ILLNESS
[Lower back] : lower back [Dull/Aching] : dull/aching [Tightness] : tightness [Occasional] : occasional [Leisure] : leisure [Injection therapy] : injection therapy [Sitting] : sitting [] : no [FreeTextEntry8] : getting out of bed  [de-identified] : getting out of bed

## 2023-07-13 ENCOUNTER — APPOINTMENT (OUTPATIENT)
Dept: PAIN MANAGEMENT | Facility: CLINIC | Age: 88
End: 2023-07-13
Payer: MEDICARE

## 2023-07-13 VITALS — BODY MASS INDEX: 34.93 KG/M2 | HEIGHT: 61 IN | WEIGHT: 185 LBS

## 2023-07-13 PROCEDURE — 99214 OFFICE O/P EST MOD 30 MIN: CPT

## 2023-07-13 NOTE — HISTORY OF PRESENT ILLNESS
[FreeTextEntry1] : 07/12/2023 : Patient presents for initial evaluation. She reports pain in the lower back with radiation down the posterior legs to the knees and to ankles. +n/t in the left big toe.  no weakness. no recent PT (financial constraints). \par \par Subjective Weakness: No\par Numbness/Tingling: Yes\par Bladder/Bowel dysfunction: No\par Treatments Tried: no relief with NSAIDS, Tylenol\par \par Attempted modalities for current pain complaint:\par See above:\par Medications: No\par \par Injections: Yes- LESI L5/S1 4/10/23- Dr. Bennett\par \par Previous Spine Surgery: N/A\par \par Imaging:\par MRI Lumbar Spine (3/23/23) Multilevel lower thoracic and lumbar degenerative disc disease and facet osteoarthrosis with grade I \par degenerative spondylolisthesis at L4-5 and a dextroscoliosis.\par Disc herniations at T10-11 and T11-12 that do not appear to significantly compress the conus medullaris.\par Small disc herniation at L1-2 and a small bulge at L2-3 without stenosis or nerve root compression.\par Mild central stenosis at L3-4.\par Significant central stenosis at L4-5 as well as compression of the left L5 nerve root in the lateral recess.\par Modic type I endplate changes adjacent to the L4-5 disc.\par   [Lower back] : lower back [9] : 9 [4] : 4 [Dull/Aching] : dull/aching [Sharp] : sharp [Shooting] : shooting [Tightness] : tightness [] : no [Frequent] : frequent [Leisure] : leisure [Sleep] : sleep [Social interactions] : social interactions [Injection therapy] : injection therapy [Sitting] : sitting [Standing] : standing [Walking] : walking [Stairs] : stairs [Lying in bed] : lying in bed [FreeTextEntry8] : getting out of bed  [de-identified] : getting out of bed

## 2023-07-13 NOTE — PHYSICAL EXAM
[] : motor exam is non-focal throughout both lower extremities [FreeTextEntry3] : has severe swelling b/l LE with redness

## 2023-07-13 NOTE — ASSESSMENT
[FreeTextEntry1] : After discussing various treatment options with the patient including but not limited to oral medications, physical therapy, exercise, modalities as well as interventional spinal injections, we have decided with the following plan:\par \par 1) Intervention Injection Therapy:\par I personally reviewed the MRI/CT scan images and agree with the radiologist's report. The radiological findings were discussed with the patient.\par The risks, benefits, contents and alternatives to injection were explained in full to the patient. Risks outlined include but are not limited to infection,sepsis, bleeding, post-dural puncture headache, nerve damage, temporary increase in pain, syncopal episode, failure to resolve symptoms, allergic reaction, symptom recurrence, and elevation of blood sugar in diabetics. Cortisone may cause immunosuppression. Patient understands the risks. All questions were answered. After discussion of options, patient requested an injection. Information regarding the injection was given to the patient. Which medications to stop prior to the injection was explained to the patient as well.\par \par Follow up in 1-2 weeks post injection for re-evaluation. \par Continue Home exercises, stretching, activity modification, physical therapy, and conservative care.\par \par Patient is presenting with acute/sub-acute radicular pain with impairment in ADLs and functionality.  The pain has not responded sufficiently to  conservative care including nsaid therapy and/or physical therapy.  There is no bleeding tendency, unstable medical condition, or systemic infection. The purpose of the spinal injections is to facilitate active therapy by providing short term relief through reduction of pain and inflammation. \par \par Injections, by themselves, are not likely to provide long-term relief. Rather, active rehabilitation with modified work achieves long-term relief by increasing active ROM, strength and stability.\par \par LESI L5/S1 \par \par \par \par

## 2023-08-04 ENCOUNTER — APPOINTMENT (OUTPATIENT)
Age: 88
End: 2023-08-04
Payer: MEDICARE

## 2023-08-04 PROCEDURE — 62323 NJX INTERLAMINAR LMBR/SAC: CPT

## 2023-08-17 ENCOUNTER — APPOINTMENT (OUTPATIENT)
Dept: PAIN MANAGEMENT | Facility: CLINIC | Age: 88
End: 2023-08-17
Payer: MEDICARE

## 2023-08-17 VITALS — WEIGHT: 185 LBS | BODY MASS INDEX: 34.93 KG/M2 | HEIGHT: 61 IN

## 2023-08-17 PROCEDURE — 99214 OFFICE O/P EST MOD 30 MIN: CPT

## 2023-08-17 NOTE — ASSESSMENT
[FreeTextEntry1] : After discussing various treatment options with the patient including but not limited to oral medications, physical therapy, exercise, modalities as well as interventional spinal injections, we have decided with the following plan:  1) Intervention Injection Therapy: I personally reviewed the MRI/CT scan images and agree with the radiologist's report. The radiological findings were discussed with the patient. The risks, benefits, contents and alternatives to injection were explained in full to the patient. Risks outlined include but are not limited to infection,sepsis, bleeding, post-dural puncture headache, nerve damage, temporary increase in pain, syncopal episode, failure to resolve symptoms, allergic reaction, symptom recurrence, and elevation of blood sugar in diabetics. Cortisone may cause immunosuppression. Patient understands the risks. All questions were answered. After discussion of options, patient requested an injection. Information regarding the injection was given to the patient. Which medications to stop prior to the injection was explained to the patient as well.  Follow up in 1-2 weeks post injection for re-evaluation.  Continue Home exercises, stretching, activity modification, physical therapy, and conservative care.  Patient is presenting with acute/sub-acute radicular pain with impairment in ADLs and functionality.  The pain has not responded sufficiently to  conservative care including nsaid therapy and/or physical therapy.  There is no bleeding tendency, unstable medical condition, or systemic infection. The purpose of the spinal injections is to facilitate active therapy by providing short term relief through reduction of pain and inflammation.   Injections, by themselves, are not likely to provide long-term relief. Rather, active rehabilitation with modified work achieves long-term relief by increasing active ROM, strength and stability.  MYA L5/S1 - will call   2) surgical eval

## 2023-08-17 NOTE — REASON FOR VISIT
[Initial Consultation] : an initial pain management consultation [Follow-Up Visit] : a follow-up pain management visit [FreeTextEntry2] : pt is being seen for her f/u to inj

## 2023-08-17 NOTE — HISTORY OF PRESENT ILLNESS
[Lower back] : lower back [9] : 9 [4] : 4 [Dull/Aching] : dull/aching [Sharp] : sharp [Shooting] : shooting [Tightness] : tightness [Frequent] : frequent [Leisure] : leisure [Sleep] : sleep [Social interactions] : social interactions [Injection therapy] : injection therapy [Sitting] : sitting [Standing] : standing [Walking] : walking [Stairs] : stairs [Lying in bed] : lying in bed [FreeTextEntry1] : 8/17/23- fu for LESI L5-S1 on 8/4.  Had 80% relief. following. she has noticed after the injection that the swelling in the legs improved.   07/12/2023 : Patient presents for initial evaluation. She reports pain in the lower back with radiation down the posterior legs to the knees and to ankles. +n/t in the left big toe.  no weakness. no recent PT (financial constraints).   Subjective Weakness: No Numbness/Tingling: Yes Bladder/Bowel dysfunction: No Treatments Tried: no relief with NSAIDS, Tylenol  Attempted modalities for current pain complaint: See above: Medications: No  Injections: Yes- LESI L5/S1 4/10/23- Dr. Bennett 8/4/23  Previous Spine Surgery: N/A  Imaging: MRI Lumbar Spine (3/23/23) Multilevel lower thoracic and lumbar degenerative disc disease and facet osteoarthrosis with grade I  degenerative spondylolisthesis at L4-5 and a dextroscoliosis. Disc herniations at T10-11 and T11-12 that do not appear to significantly compress the conus medullaris. Small disc herniation at L1-2 and a small bulge at L2-3 without stenosis or nerve root compression. Mild central stenosis at L3-4. Significant central stenosis at L4-5 as well as compression of the left L5 nerve root in the lateral recess. Modic type I endplate changes adjacent to the L4-5 disc.   [] : no [FreeTextEntry8] : getting out of bed  [de-identified] : getting out of bed  [TWNoteComboBox1] : 90%

## 2023-08-21 ENCOUNTER — NON-APPOINTMENT (OUTPATIENT)
Age: 88
End: 2023-08-21

## 2023-09-12 ENCOUNTER — APPOINTMENT (OUTPATIENT)
Dept: ORTHOPEDIC SURGERY | Facility: CLINIC | Age: 88
End: 2023-09-12
Payer: MEDICARE

## 2023-09-12 VITALS — WEIGHT: 185 LBS | BODY MASS INDEX: 34.93 KG/M2 | HEIGHT: 61 IN

## 2023-09-12 PROCEDURE — 99214 OFFICE O/P EST MOD 30 MIN: CPT

## 2023-09-21 ASSESSMENT — KOOS JR
BENDING TO THE FLOOR TO PICK UP OBJECT: SEVERE
IMPORTED FORM: YES
HOW SEVERE IS YOUR KNEE STIFFNESS AFTER FIRST WAKING IN MORNING: SEVERE
STRAIGHTENING KNEE FULLY: MODERATE
KOOS JR RAW SCORE: 18
IMPORTED KOOS JR SCORE: 18.0
RISING FROM SITTING: MODERATE
STANDING UPRIGHT: MODERATE
GOING UP OR DOWN STAIRS: SEVERE
TWISING OR PIVOTING ON KNEE: SEVERE
IMPORTED LATERALITY: RIGHT

## 2023-11-28 ENCOUNTER — RESULT CHARGE (OUTPATIENT)
Age: 88
End: 2023-11-28

## 2023-11-28 ENCOUNTER — APPOINTMENT (OUTPATIENT)
Dept: ORTHOPEDIC SURGERY | Facility: CLINIC | Age: 88
End: 2023-11-28
Payer: MEDICARE

## 2023-11-28 VITALS — BODY MASS INDEX: 34.93 KG/M2 | WEIGHT: 185 LBS | HEIGHT: 61 IN

## 2023-11-28 PROCEDURE — 73562 X-RAY EXAM OF KNEE 3: CPT | Mod: RT

## 2023-11-28 PROCEDURE — 99213 OFFICE O/P EST LOW 20 MIN: CPT

## 2023-12-11 ENCOUNTER — APPOINTMENT (OUTPATIENT)
Dept: PAIN MANAGEMENT | Facility: CLINIC | Age: 88
End: 2023-12-11
Payer: MEDICARE

## 2023-12-11 PROCEDURE — 62323 NJX INTERLAMINAR LMBR/SAC: CPT

## 2024-01-04 ENCOUNTER — APPOINTMENT (OUTPATIENT)
Dept: PAIN MANAGEMENT | Facility: CLINIC | Age: 89
End: 2024-01-04
Payer: MEDICARE

## 2024-01-04 VITALS — HEIGHT: 61 IN | WEIGHT: 187 LBS | BODY MASS INDEX: 35.3 KG/M2

## 2024-01-04 DIAGNOSIS — M54.17 RADICULOPATHY, LUMBOSACRAL REGION: ICD-10-CM

## 2024-01-04 PROCEDURE — 99214 OFFICE O/P EST MOD 30 MIN: CPT

## 2024-01-04 NOTE — REASON FOR VISIT
[Follow-Up Visit] : a follow-up pain management visit [FreeTextEntry2] : pt is being seen for her f/u to inj

## 2024-01-09 ENCOUNTER — APPOINTMENT (OUTPATIENT)
Dept: ORTHOPEDIC SURGERY | Facility: CLINIC | Age: 89
End: 2024-01-09
Payer: MEDICARE

## 2024-01-09 VITALS — HEIGHT: 61 IN | WEIGHT: 187 LBS | BODY MASS INDEX: 35.3 KG/M2

## 2024-01-09 DIAGNOSIS — Z96.651 PRESENCE OF RIGHT ARTIFICIAL KNEE JOINT: ICD-10-CM

## 2024-01-09 PROCEDURE — 99213 OFFICE O/P EST LOW 20 MIN: CPT

## 2024-01-16 NOTE — CONSULT NOTE ADULT - SUBJECTIVE AND OBJECTIVE BOX
Pt back from MRI.   87y Female s/p R TKA with Dr. Jones on  presents with erythema and itchiness of the RLE. Pt states she started to develop these symptoms yesterday and has a history of cellulitis in that extremity which required IV abx. Denies trauma or any new items that may have caused an allergic rxn. States she had dopplers done yesterday which were negative for DVT. Denies numbness/tingling in the affected extremity. Denies pain in the R knee. No other orthopedic complaints at this time.    PAST MEDICAL & SURGICAL HISTORY:  Sheehans syndrome      Thyroid disease      HTN (hypertension)      History of hepatitis B      Prediabetes      HLD (hyperlipidemia)      H/O: hysterectomy      H/O:   x4      History of excision of pilonidal cyst      H/O intestinal obstruction        Home Medications:  acetaminophen 325 mg oral tablet: 3 tab(s) orally every 8 hours (2022 09:43)  ascorbic acid 500 mg oral tablet: 1 tab(s) orally 2 times a day (:43)  hydrocortisone 10 mg oral tablet: 1 tab(s) orally once a day in AM (2022 10:22)  hydrocortisone 5 mg oral tablet: 1 tab(s) orally once a day in PM (2022 10:22)  levothyroxine 100 mcg (0.1 mg) oral tablet: 1 tab(s) orally once a day (2022 10:22)  losartan-hydrochlorothiazide 50 mg-12.5 mg oral tablet: 1 tab(s) orally once a day (2022 10:22)  Multiple Vitamins oral tablet: 1 tab(s) orally once a day (:43)  polyethylene glycol 3350 oral powder for reconstitution: 17 gram(s) orally once a day (at bedtime) (2022 09:43)  potassium chloride 20 mEq oral tablet, extended release: 1 tab(s) orally once a day (2022 10:02)  Repatha 140 mg/mL subcutaneous solution: subcutaneous 2 times a month (2022 10:22)  senna leaf extract oral tablet: 2 tab(s) orally once a day (at bedtime) (2022 09:43)    Allergies    codeine (Headache)  doxycycline (Hives)  scallops (Nausea)  shellfish (Nausea)    Intolerances                              11.1   11.85 )-----------( 244      ( 2022 20:00 )             32.9     07-19    134<L>  |  96  |  23  ----------------------------<  103<H>  4.3   |  30  |  1.18    Ca    9.7      2022 20:00  Mg     2.0     07-19    TPro  6.7  /  Alb  3.2<L>  /  TBili  0.3  /  DBili  x   /  AST  32  /  ALT  44  /  AlkPhos  71  07-19            Vital Signs Last 24 Hrs  T(C): 36.8 (2022 18:52), Max: 36.8 (2022 18:52)  T(F): 98.3 (2022 18:52), Max: 98.3 (2022 18:52)  HR: 63 (2022 18:52) (63 - 63)  BP: 147/74 (2022 18:52) (147/74 - 147/74)  BP(mean): --  RR: 18 (2022 18:52) (18 - 18)  SpO2: 97% (2022 18:52) (97% - 97%)    Parameters below as of 2022 18:52  Patient On (Oxygen Delivery Method): room air        PHYSICAL EXAM  General: NAD, Awake and Alert    RLE  incision with prineo overlying CDI  skin intact  no knee effusion, erythema, or TTP  painless knee ROM  erythema and slightly increased warmth overlying the mid anterior tibia to the medial calf  SILT L2-S1  +hip flexors/hams/quads/TA/EHL/FHL/GSC  DP/PT palpable  calves NTTP b/l      Assessment/Plan:  87y Female with RLE cellulitis  no concern for PJI given painless ROM and normal WBC    -abx per ED/medicine  -Pain control as needed  -DVT ppx: c/w ASA 81 bid  -WBAT  -PT/OT  -ortho stable for DC  -Discussed with Dr. Jones who agrees with plan   87y Female s/p R TKA with Dr. Jones on  presents with erythema and itchiness of the RLE. Pt states she started to develop these symptoms yesterday and has a history of cellulitis in that extremity which required IV abx. Denies trauma or any new items that may have caused an allergic rxn. States she had dopplers done yesterday which were negative for DVT. Denies numbness/tingling in the affected extremity. Denies pain in the R knee. No other orthopedic complaints at this time.    PAST MEDICAL & SURGICAL HISTORY:  Sheehans syndrome      Thyroid disease      HTN (hypertension)      History of hepatitis B      Prediabetes      HLD (hyperlipidemia)      H/O: hysterectomy      H/O:   x4      History of excision of pilonidal cyst      H/O intestinal obstruction        Home Medications:  acetaminophen 325 mg oral tablet: 3 tab(s) orally every 8 hours (2022 09:43)  ascorbic acid 500 mg oral tablet: 1 tab(s) orally 2 times a day (:43)  hydrocortisone 10 mg oral tablet: 1 tab(s) orally once a day in AM (2022 10:22)  hydrocortisone 5 mg oral tablet: 1 tab(s) orally once a day in PM (2022 10:22)  levothyroxine 100 mcg (0.1 mg) oral tablet: 1 tab(s) orally once a day (2022 10:22)  losartan-hydrochlorothiazide 50 mg-12.5 mg oral tablet: 1 tab(s) orally once a day (2022 10:22)  Multiple Vitamins oral tablet: 1 tab(s) orally once a day (:43)  polyethylene glycol 3350 oral powder for reconstitution: 17 gram(s) orally once a day (at bedtime) (2022 09:43)  potassium chloride 20 mEq oral tablet, extended release: 1 tab(s) orally once a day (2022 10:02)  Repatha 140 mg/mL subcutaneous solution: subcutaneous 2 times a month (2022 10:22)  senna leaf extract oral tablet: 2 tab(s) orally once a day (at bedtime) (2022 09:43)    Allergies    codeine (Headache)  doxycycline (Hives)  scallops (Nausea)  shellfish (Nausea)    Intolerances                              11.1   11.85 )-----------( 244      ( 2022 20:00 )             32.9     07-19    134<L>  |  96  |  23  ----------------------------<  103<H>  4.3   |  30  |  1.18    Ca    9.7      2022 20:00  Mg     2.0     07-19    TPro  6.7  /  Alb  3.2<L>  /  TBili  0.3  /  DBili  x   /  AST  32  /  ALT  44  /  AlkPhos  71  07-19            Vital Signs Last 24 Hrs  T(C): 36.8 (2022 18:52), Max: 36.8 (2022 18:52)  T(F): 98.3 (2022 18:52), Max: 98.3 (2022 18:52)  HR: 63 (2022 18:52) (63 - 63)  BP: 147/74 (2022 18:52) (147/74 - 147/74)  BP(mean): --  RR: 18 (2022 18:52) (18 - 18)  SpO2: 97% (2022 18:52) (97% - 97%)    Parameters below as of 2022 18:52  Patient On (Oxygen Delivery Method): room air        PHYSICAL EXAM  General: NAD, Awake and Alert    RLE  incision with prineo overlying CDI  skin intact  no knee effusion, erythema, or TTP  painless knee ROM  erythema and slightly increased warmth overlying the mid anterior tibia to the medial calf  SILT L2-S1  +hip flexors/hams/quads/TA/EHL/FHL/GSC  DP/PT palpable  calves NTTP b/l  Compartments soft and compressible      Assessment/Plan:  87y Female with RLE cellulitis    -No concern for PJI given painless ROM and normal WBC  Medical management appreciated  -abx per ED/medicine  -Pain control as needed  -DVT ppx: c/w ASA 81 bid  -WBAT  -PT/OT  -No acute orthopedic surgical intervention indicated at this time. ortho stable for DC. Patient to follow up with Dr. Jones as outpatient at normal postop follow up for further evaluation and treatment.   -Discussed with Dr. Jones who agrees with plan

## 2024-03-07 ENCOUNTER — APPOINTMENT (OUTPATIENT)
Dept: PAIN MANAGEMENT | Facility: CLINIC | Age: 89
End: 2024-03-07

## 2024-03-21 ENCOUNTER — APPOINTMENT (OUTPATIENT)
Dept: PAIN MANAGEMENT | Facility: CLINIC | Age: 89
End: 2024-03-21

## 2024-03-21 NOTE — ADDENDUM
[FreeTextEntry1] :  laterality and levels of the spine that the procedure will affect is B/L L5 and s1 nerve roots.  - The duration of relief from previous LESI L5/S1 injection done on 12/11/2023 was 3 months.  current medications taken for pain are OTC. - Rationale for the continuation of ESIs including patient who are high-risk surgical candidates, the patient does not desire surgery, and/or recurrence of pain in the same location relieved with ESIs for at least three (3) months; AND - patient will speak to PCP about JOAO and steroids

## 2024-03-21 NOTE — HISTORY OF PRESENT ILLNESS
[9] : 9 [4] : 4 [Sharp] : sharp [Shooting] : shooting [Frequent] : frequent [Lower back] : lower back [Dull/Aching] : dull/aching [Tightness] : tightness [Leisure] : leisure [Sleep] : sleep [Social interactions] : social interactions [Injection therapy] : injection therapy [Sitting] : sitting [Standing] : standing [Walking] : walking [Stairs] : stairs [Lying in bed] : lying in bed [7] : 7 [3] : 3 [Intermittent] : intermittent [FreeTextEntry1] : 01/04/2024: follow up today after LESI L5/S1 on 12/11/23 with 60% relief. Pain is slowly returning. she has been trying to rest. She was been going to PT which is helping.  Pain is deep to buttocks.  8/17/23- fu for LESI L5-S1 on 8/4.  Had 80% relief. following. she has noticed after the injection that the swelling in the legs improved.   07/12/2023 : Patient presents for initial evaluation. She reports pain in the lower back with radiation down the posterior legs to the knees and to ankles. +n/t in the left big toe.  no weakness. no recent PT (financial constraints).   Subjective Weakness: No Numbness/Tingling: Yes Bladder/Bowel dysfunction: No Treatments Tried: no relief with NSAIDS, Tylenol  Attempted modalities for current pain complaint: See above: Medications: No  Injections: Yes- LESI L5/S1 4/10/23- Dr. Bennett 8/4/23 12/11/23- [facility]  Previous Spine Surgery: N/A  Imaging: MRI Lumbar Spine (3/23/23) Multilevel lower thoracic and lumbar degenerative disc disease and facet osteoarthrosis with grade I  degenerative spondylolisthesis at L4-5 and a dextroscoliosis. Disc herniations at T10-11 and T11-12 that do not appear to significantly compress the conus medullaris. Small disc herniation at L1-2 and a small bulge at L2-3 without stenosis or nerve root compression. Mild central stenosis at L3-4. Significant central stenosis at L4-5 as well as compression of the left L5 nerve root in the lateral recess. Modic type I endplate changes adjacent to the L4-5 disc.   [] : no [de-identified] : getting out of bed  [FreeTextEntry8] : getting out of bed  [FreeTextEntry7] : back of legs, thighs  [de-identified] : l mri [TWNoteComboBox1] : 90%

## 2024-03-21 NOTE — ASSESSMENT
[FreeTextEntry1] : After discussing various treatment options with the patient including but not limited to oral medications, physical therapy, exercise, modalities as well as interventional spinal injections, we have decided with the following plan:  1) Intervention Injection Therapy: I personally reviewed the MRI/CT scan images and agree with the radiologist's report. The radiological findings were discussed with the patient. The risks, benefits, contents and alternatives to injection were explained in full to the patient. Risks outlined include but are not limited to infection,sepsis, bleeding, post-dural puncture headache, nerve damage, temporary increase in pain, syncopal episode, failure to resolve symptoms, allergic reaction, symptom recurrence, and elevation of blood sugar in diabetics. Cortisone may cause immunosuppression. Patient understands the risks. All questions were answered. After discussion of options, patient requested an injection. Information regarding the injection was given to the patient. Which medications to stop prior to the injection was explained to the patient as well.  Follow up in 1-2 weeks post injection for re-evaluation.  Continue Home exercises, stretching, activity modification, physical therapy, and conservative care.  LESI L5/s1 - will call   2) Encourage use of compression stockings  3) The patient would benefit from continuation of physical therapy. Short and Long Term goals would be improvement of pain level, improvement of range of motion, improvement of strength and overall improvement of quality of life.  Patient instructed to continue both active and passive therapy, at home as an extension of the treatment process in order to maintain improvement.   Goals: improve cardiovascular fitness, reduce edema, improve muscle strength, improve connective tissue strength and integrity, increase bone density, promote circulation to enhance soft tissue healing, improvement of muscle recruitment, increased ROM and promotion of normal movement.

## 2024-03-21 NOTE — HISTORY OF PRESENT ILLNESS
[9] : 9 [4] : 4 [Sharp] : sharp [Shooting] : shooting [Frequent] : frequent [Lower back] : lower back [Dull/Aching] : dull/aching [Tightness] : tightness [Leisure] : leisure [Sleep] : sleep [Social interactions] : social interactions [Injection therapy] : injection therapy [Sitting] : sitting [Standing] : standing [Walking] : walking [Stairs] : stairs [Lying in bed] : lying in bed [7] : 7 [3] : 3 [Intermittent] : intermittent [FreeTextEntry1] : 01/04/2024: follow up today after LESI L5/S1 on 12/11/23 with 60% relief. Pain is slowly returning. she has been trying to rest. She was been going to PT which is helping.  Pain is deep to buttocks.  8/17/23- fu for LESI L5-S1 on 8/4.  Had 80% relief. following. she has noticed after the injection that the swelling in the legs improved.   07/12/2023 : Patient presents for initial evaluation. She reports pain in the lower back with radiation down the posterior legs to the knees and to ankles. +n/t in the left big toe.  no weakness. no recent PT (financial constraints).   Subjective Weakness: No Numbness/Tingling: Yes Bladder/Bowel dysfunction: No Treatments Tried: no relief with NSAIDS, Tylenol  Attempted modalities for current pain complaint: See above: Medications: No  Injections: Yes- LESI L5/S1 4/10/23- Dr. Bennett 8/4/23 12/11/23- [facility]  Previous Spine Surgery: N/A  Imaging: MRI Lumbar Spine (3/23/23) Multilevel lower thoracic and lumbar degenerative disc disease and facet osteoarthrosis with grade I  degenerative spondylolisthesis at L4-5 and a dextroscoliosis. Disc herniations at T10-11 and T11-12 that do not appear to significantly compress the conus medullaris. Small disc herniation at L1-2 and a small bulge at L2-3 without stenosis or nerve root compression. Mild central stenosis at L3-4. Significant central stenosis at L4-5 as well as compression of the left L5 nerve root in the lateral recess. Modic type I endplate changes adjacent to the L4-5 disc.   [] : no [de-identified] : getting out of bed  [FreeTextEntry7] : back of legs, thighs  [FreeTextEntry8] : getting out of bed  [de-identified] : l mri [TWNoteComboBox1] : 90%

## 2024-04-04 ENCOUNTER — APPOINTMENT (OUTPATIENT)
Dept: PAIN MANAGEMENT | Facility: CLINIC | Age: 89
End: 2024-04-04

## 2024-04-04 ENCOUNTER — APPOINTMENT (OUTPATIENT)
Dept: PAIN MANAGEMENT | Facility: CLINIC | Age: 89
End: 2024-04-04
Payer: MEDICARE

## 2024-04-04 DIAGNOSIS — M48.062 SPINAL STENOSIS, LUMBAR REGION WITH NEUROGENIC CLAUDICATION: ICD-10-CM

## 2024-04-04 PROCEDURE — 62323 NJX INTERLAMINAR LMBR/SAC: CPT

## 2024-04-04 NOTE — PROCEDURE
[FreeTextEntry3] : Date of Service: 04/04/2024   Account: [id number]   Patient: PARISH WARD   YOB: 1935   Age: 89 year     Surgeon:                                               Katiuska Bradshaw M.D.   Pre-Operative Diagnosis:                   Lumbosacral Radiculitis (M54.17)       Post Operative Diagnosis:                 Lumbosacral Radiculitis (M54.17)       Procedure:                                           Interlaminar lumbar epidural steroid injection (L5-S1) under fluoroscopic guidance   Anesthesia:                None     This procedure was carried out using fluoroscopic guidance.  The risks and benefits of the procedure were discussed extensively with the patient.  The consent of the patient was obtained and the following procedure was performed.   The patient was placed in the prone position.  The lumbar area was prepped and draped in a sterile fashion.  Under AP view with slight cephalad-caudad angulation, the L5-S1 interspace was identified and marked.  Using sterile technique the superficial skin was anesthetized with 1% Lidocaine without epinephrine.  A 20 gauge Tuohoy needle was advanced into the epidural space under fluoroscopy using [id number] technique and using loss of resistance at the L5-S1 level.  After negative aspiration for heme or CSF, an epidurogram was obtained using 3 cc Omnipaque contrast confirming epidural placement of the needle.    Epidurogram showed no evidence of intrathecal or intravascular flow, and good evidence of bilateral epidural flow from L3-S2 levels.  After this, 5 cc of preservative free normal saline and 80 mg of Kenalog were injected into the epidural space.   The needle was subsequently removed.     The patient tolerated the procedure well and was instructed to contact me immediately if there were any problems.   Katiuska Bradshaw M.D.

## 2024-04-05 NOTE — H&P ADULT - ASSESSMENT
Discharge Summary    CHIEF COMPLAINT ON ADMISSION  Chief Complaint   Patient presents with    GLF     TBI       Reason for Admission  EMS     Admission Date  4/1/2024    CODE STATUS  DNAR/DNI    HPI & HOSPITAL COURSE     66 y.o. female with history of of gestational disease, smoking, diabetes type 1, hypothyroidism, hypertension, ESRD on hemodialysis who presented 4/1/2024 with complaints of right hip pain after fall.  Imaging noted with right inferior pubis ramus fracture.  Reportedly had syncopal episode.  No event on telemetry monitor.  Orthopedic Dr. Montes De Oca evaluated and recommended nonsurgical management.  PT recommending home with no further therapy.  Patient would like to go home.  Accepted by [facility].    Patient seen and examined at bedside.  Vitals remained stable.  Labs were unremarkable, blood glucose remained stable.  I advised her to follow-up with her endocrinologist to review concerns and discharge.  Follow-up with orthopedics on discharge  Discharge plan was discussed with patient in details .  Patient agreed with discharge plan  and  all questions answered.         Therefore, she is discharged in good and stable condition to home with organized home healthcare and close outpatient follow-up.    The patient met 2-midnight criteria for an inpatient stay at the time of discharge.    Discharge Date  4/5/2024          DISCHARGE DIAGNOSES  Principal Problem:    Right inferior pubic rami fracture (POA: Yes)  Active Problems:    Cigarette smoker (POA: Yes)    Type 1 diabetes mellitus on insulin therapy (HCC) (POA: Yes)      Overview: ICD-10 transition    Hypothyroidism (POA: Yes)      Overview:                 ESRD needing dialysis (HCC) (POA: Yes)    Syncope (POA: Unknown)  Resolved Problems:    * No resolved hospital problems. *      FOLLOW UP  Future Appointments   Date Time Provider Department Center   4/11/2024  3:40 PM DIPESH Cm M.D.  [address]  [facility] [address]  [facility] [address]  [phone]            MEDICATIONS ON DISCHARGE     Medication List        START taking these medications        Instructions   HYDROcodone-acetaminophen 5-325 MG Tabs per tablet  Commonly known as: Norco   Take 1 Tablet by mouth every 8 hours as needed (severe pain) for up to 5 days.  Dose: 1 Tablet            CHANGE how you take these medications        Instructions   famotidine 20 MG Tabs  What changed: when to take this  Commonly known as: Pepcid   TAKE 1 TABLET BY MOUTH TWICE A DAY            CONTINUE taking these medications        Instructions   albuterol 108 (90 Base) MCG/ACT Aers inhalation aerosol   Inhale 2 Puffs every 6 hours as needed for Shortness of Breath.  Dose: 2 Puff     amLODIPine 5 MG Tabs  Commonly known as: Norvasc   Take 5 mg by mouth every day.  Dose: 5 mg     Anoro Ellipta 62.5-25 MCG/ACT Aepb inhaler  Generic drug: umeclidinium-vilanterol   Inhale 1 Puff every day.  Dose: 1 Puff     atorvastatin 40 MG Tabs  Commonly known as: Lipitor   Take 1 Tablet by mouth every evening.  Dose: 40 mg     fenofibrate 48 MG Tabs  Commonly known as: Tricor   Take 48 mg by mouth every morning.  Dose: 48 mg     hydrALAZINE 25 MG Tabs  Commonly known as: Apresoline   Take 1 Tablet by mouth every 8 hours.  Dose: 25 mg     Lantus SoloStar 100 UNIT/ML Sopn injection  Generic drug: insulin glargine   Inject 35 Units under the skin every evening.  Dose: 35 Units     levothyroxine 125 MCG Tabs  Commonly known as: Synthroid   Take 2 Tablets by mouth every morning on an empty stomach. 2 tablets=250mg DAW1  Dose: 250 mcg     liothyronine 5 MCG Tabs  Commonly known as: Cytomel   Take 1 Tablet by mouth every day.  Dose: 5 mcg     metoprolol SR 50 MG Tb24  Commonly known as: Toprol XL   Take 200 mg by mouth every day. 4 tablets=200mg  Dose: 200 mg     omeprazole 40 MG delayed-release capsule  Commonly known as: PriLOSEC   Take 40 mg by mouth every day.  Dose: 40 mg     ondansetron  8 MG Tbdp  Commonly known as: Zofran ODT   Take 8 mg by mouth every 8 hours as needed for Nausea.  Dose: 8 mg     oxycodone 15 MG immediate release tablet  Commonly known as: Oxy-IR   Take 15 mg by mouth every four hours as needed for Severe Pain.  Dose: 15 mg     Senokot S 8.6-50 MG Tabs  Generic drug: senna-docusate   Take 1 Tablet by mouth every day.  Dose: 1 Tablet     torsemide 100 MG Tabs  Commonly known as: Demadex   Take 1 Tablet by mouth every day.  Dose: 100 mg     traZODone 150 MG Tabs  Commonly known as: Desyrel   Take 150 mg by mouth at bedtime.  Dose: 150 mg     ursodiol 300 MG Caps  Commonly known as: Actigall   Take 300-600 mg by mouth 2 times a day. 1 capsule qam & 2 caps qpm  Dose: 300-600 mg     venlafaxine 150 MG extended-release capsule  Commonly known as: Effexor-XR   Take 150 mg by mouth every day.  Dose: 150 mg     VITAMIN B-12 PO   Take 1 Tablet by mouth every day.  Dose: 1 Tablet     VITAMIN D (ERGOCALCIFEROL) PO   Take 1 Tablet by mouth 2 times a day.  Dose: 1 Tablet              Allergies  Allergies   Allergen Reactions    Tape Unspecified and Rash     Blisters, paper tape is ok  Other reaction(s): Rash       DIET  Orders Placed This Encounter   Procedures    Diet Order Diet: Renal; Second Modifier: (optional): Consistent CHO (Diabetic)     Standing Status:   Standing     Number of Occurrences:   1     Order Specific Question:   Diet:     Answer:   Renal [8]     Order Specific Question:   Second Modifier: (optional)     Answer:   Consistent CHO (Diabetic) [4]       ACTIVITY  As tolerated.  Weight bearing as tolerated    CONSULTATIONS  [facility]  Ortho     PROCEDURES  CT-PELVIS W/O PLUS RECONS   Final Result      1.  Acute right inferior pubic ramus fracture      2.  No other fracture      3.  Lumbar spine facet arthropathy and levoconvex scoliosis      4.  Bilateral sacroiliac joint osteoarthritis         CT-HEAD W/O   Final Result      1.  No evidence of acute intracranial process.      2.   Periventricular chronic small vessel ischemic change.               LABORATORY  Lab Results   Component Value Date    SODIUM 133 (L) 04/05/2024    POTASSIUM 4.3 04/05/2024    CHLORIDE 95 (L) 04/05/2024    CO2 25 04/05/2024    GLUCOSE 160 (H) 04/05/2024    BUN 29 (H) 04/05/2024    CREATININE 2.59 (H) 04/05/2024    CREATININE 1.0 01/08/2009        Lab Results   Component Value Date    WBC 9.1 04/04/2024    HEMOGLOBIN 8.4 (L) 04/04/2024    HEMATOCRIT 26.2 (L) 04/04/2024    PLATELETCT 229 04/04/2024        Total time of the discharge process exceeds 37 minutes.   covid 19   Cellulitis right leg   recent Rt TKR   Hypothyroid   Alessandra's syndrome   stable for Id management

## 2024-04-25 ENCOUNTER — APPOINTMENT (OUTPATIENT)
Dept: PAIN MANAGEMENT | Facility: CLINIC | Age: 89
End: 2024-04-25
Payer: MEDICARE

## 2024-04-25 VITALS — WEIGHT: 182 LBS | HEIGHT: 61 IN | BODY MASS INDEX: 34.36 KG/M2

## 2024-04-25 DIAGNOSIS — M54.2 CERVICALGIA: ICD-10-CM

## 2024-04-25 DIAGNOSIS — M43.16 SPONDYLOLISTHESIS, LUMBAR REGION: ICD-10-CM

## 2024-04-25 PROCEDURE — 99214 OFFICE O/P EST MOD 30 MIN: CPT

## 2024-04-25 NOTE — HISTORY OF PRESENT ILLNESS
[Lower back] : lower back [4] : 4 [Dull/Aching] : dull/aching [Sharp] : sharp [Shooting] : shooting [Tightness] : tightness [Leisure] : leisure [Sleep] : sleep [Social interactions] : social interactions [Injection therapy] : injection therapy [Sitting] : sitting [Standing] : standing [Walking] : walking [Stairs] : stairs [Lying in bed] : lying in bed [] : yes [7] : 7 [Constant] : constant

## 2024-05-09 ENCOUNTER — APPOINTMENT (OUTPATIENT)
Dept: PAIN MANAGEMENT | Facility: CLINIC | Age: 89
End: 2024-05-09
Payer: MEDICARE

## 2024-05-09 PROCEDURE — 64493 INJ PARAVERT F JNT L/S 1 LEV: CPT | Mod: LT

## 2024-05-09 PROCEDURE — J3490M: CUSTOM

## 2024-05-09 PROCEDURE — 64494 INJ PARAVERT F JNT L/S 2 LEV: CPT | Mod: 59,RT

## 2024-05-09 NOTE — PROCEDURE
[FreeTextEntry3] : Date of Service: 05/09/2024   Account: [id number]   Patient: PARISH WARD   YOB: 1935   Age: 89 year     Surgeon:                                      Katiuska Bradshaw M.D.   Assistant:                                    None.   Pre-Operative Diagnosis:            Spondylosis of Lumbar Region without Myelopathy or Radiculopathy (M47.816)      Post Operative Diagnosis:        Same    Procedure:                 Right L4-5, L5-S1 Facet block                                       Left L4-5, L5-S1 Facet block under fluoroscopic guidance    Anesthesia:                None     This procedure was carried out using fluoroscopic guidance.  The risks and benefits of the procedure were discussed extensively with the patient.  The consent of the patient was obtained and the following procedure was performed.   The patient was placed in the prone position.  The patient's back was prepped and draped in a sterile fashion.  The left L4 and L5 lumbar vertebral bodies were identified and the fluoroscope left obliqued to approximately 30 degrees to reveal good "Shin-dog" anatomical view.  The junction of the superior articulate process and tranverse process at the L4 and L5 level was then identified and marked. The skin at these target points was then localized using 1 cc of 1% Lidocaine without epinephrine at each injection site.  A spinal needle was then introduced and advanced to the above target points at the junction of the SAP and transverse processes until oss was contacted.  After negative aspiration for heme and CSF, an injectate of 1cc 0.25% marcaine and 10mg of methylprednisolone acetate was injected at each of the two levels.   The right L4 and L5 lumbar vertebral bodies were identified and the fluoroscope right obliqued to approximately 30 degrees to reveal good "Shin-dog" anatomical view.  The junction of the superior articulate process and tranverse process at the L4 and L5 level was then identified and marked. The skin at these target points was then localized using 1 cc of 1% Lidocaine without epinephrine at each injection site.  A spinal needle was then introduced and advanced to the above target points at the junction of the SAP and transverse processes until oss was contacted.  After negative aspiration for heme and CSF, an injectate of 1cc 0.25% marcaine and 10mg of methylprednisolone acetate was injected at each of the two levels.   Fluoroscope then focused on the bilateral sacral ala on AP view, and marked at these points.  The skin and subcutaneous structures were localized using 1cc of 1.0 % lidocaine without epinephrine.  A spinal needle was then advanced under fluoroscopic guidance until oss was contacted at the ala bilaterally.  After negative aspiration for heme and CSF, an injectate of 1cc 0.25% marcaine and 10mg of methylprednisolone acetate was injected at each site.   The needles were then removed and pressure was applied.  Vitals were monitored throughout.    Katiuska Bradshaw M.D.

## 2024-05-30 ENCOUNTER — APPOINTMENT (OUTPATIENT)
Dept: PAIN MANAGEMENT | Facility: CLINIC | Age: 89
End: 2024-05-30
Payer: MEDICARE

## 2024-05-30 VITALS — HEIGHT: 61 IN | WEIGHT: 184 LBS | BODY MASS INDEX: 34.74 KG/M2

## 2024-05-30 DIAGNOSIS — M47.816 SPONDYLOSIS W/OUT MYELOPATHY OR RADICULOPATHY, LUMBAR REGION: ICD-10-CM

## 2024-05-30 PROCEDURE — 99214 OFFICE O/P EST MOD 30 MIN: CPT

## 2024-05-30 NOTE — HISTORY OF PRESENT ILLNESS
[Lower back] : lower back [7] : 7 [4] : 4 [Dull/Aching] : dull/aching [Sharp] : sharp [Shooting] : shooting [Tightness] : tightness [Constant] : constant [Leisure] : leisure [Sleep] : sleep [Social interactions] : social interactions [Injection therapy] : injection therapy [Sitting] : sitting [Standing] : standing [Walking] : walking [Stairs] : stairs [Lying in bed] : lying in bed [de-identified] : getting out of bed  [6] : 6 [2] : 2 [FreeTextEntry1] : 05/30/2024: follow up today after b/l lumbar MBB on 5/9/24 with relief the day of the injection and started again on Sunday. She finds she can stand longer and pain in the morning has improved.   04/25/2024: follow up today for LESI L5/S1on 4/4 with no relief. Pain is across the lower back.   01/04/2024: follow up today after LESI L5/S1 on 12/11/23. she had great relief for about 2 months.   8/17/23- fu for LESI L5-S1 on 8/4.  Had 80% relief. following. she has noticed after the injection that the swelling in the legs improved.   07/12/2023 : Patient presents for initial evaluation. She reports pain in the lower back with radiation down the posterior legs to the knees and to ankles. +n/t in the left big toe.  no weakness. no recent PT (financial constraints).   Subjective Weakness: No Numbness/Tingling: Yes Bladder/Bowel dysfunction: No Treatments Tried: no relief with NSAIDS, Tylenol  Attempted modalities for current pain complaint: See above: Medications: No  Injections: Yes- LESI L5/S1 4/10/23- Dr. Bennett 8/4/23 12/11/23- [facility] LESI L5/S14/4/24 b/l lumbar MBB 5/9/24  Previous Spine Surgery: N/A  Imaging: MRI Lumbar Spine (3/23/23) Multilevel lower thoracic and lumbar degenerative disc disease and facet osteoarthrosis with grade I  degenerative spondylolisthesis at L4-5 and a dextroscoliosis. Disc herniations at T10-11 and T11-12 that do not appear to significantly compress the conus medullaris. Small disc herniation at L1-2 and a small bulge at L2-3 without stenosis or nerve root compression. Mild central stenosis at L3-4. Significant central stenosis at L4-5 as well as compression of the left L5 nerve root in the lateral recess. Modic type I endplate changes adjacent to the L4-5 disc.   [] : no [FreeTextEntry8] : getting out of bed  [TWNoteComboBox1] : 90%

## 2024-05-30 NOTE — ASSESSMENT

## 2024-07-18 ENCOUNTER — APPOINTMENT (OUTPATIENT)
Dept: PAIN MANAGEMENT | Facility: CLINIC | Age: 89
End: 2024-07-18
Payer: MEDICARE

## 2024-07-18 DIAGNOSIS — M47.816 SPONDYLOSIS W/OUT MYELOPATHY OR RADICULOPATHY, LUMBAR REGION: ICD-10-CM

## 2024-07-18 PROCEDURE — 64494 INJ PARAVERT F JNT L/S 2 LEV: CPT | Mod: 50

## 2024-07-18 PROCEDURE — J3490M: CUSTOM

## 2024-07-18 PROCEDURE — 64493 INJ PARAVERT F JNT L/S 1 LEV: CPT | Mod: 59,RT

## 2024-08-01 ENCOUNTER — APPOINTMENT (OUTPATIENT)
Dept: PAIN MANAGEMENT | Facility: CLINIC | Age: 89
End: 2024-08-01
Payer: MEDICARE

## 2024-08-01 VITALS — BODY MASS INDEX: 35.5 KG/M2 | HEIGHT: 61 IN | WEIGHT: 188 LBS

## 2024-08-01 DIAGNOSIS — M25.511 PAIN IN RIGHT SHOULDER: ICD-10-CM

## 2024-08-01 DIAGNOSIS — M54.17 RADICULOPATHY, LUMBOSACRAL REGION: ICD-10-CM

## 2024-08-01 PROCEDURE — J3490M: CUSTOM

## 2024-08-01 PROCEDURE — 20610 DRAIN/INJ JOINT/BURSA W/O US: CPT | Mod: RT

## 2024-08-01 PROCEDURE — 99214 OFFICE O/P EST MOD 30 MIN: CPT | Mod: 25

## 2024-08-01 NOTE — PHYSICAL EXAM
[FreeTextEntry3] : has severe swelling b/l LE with redness  [Right] : right shoulder [] : tenderness over posterior shoulder [Sitting] : sitting [Moderate] : moderate [TWNoteComboBox7] : active forward flexion 110 degrees [de-identified] : active abduction 120 degrees

## 2024-08-01 NOTE — PROCEDURE
[Large Joint Injection] : Large joint injection [Right] : of the right [Shoulder] : shoulder [Pain] : pain [Inflammation] : inflammation [X-ray evidence of Osteoarthritis on this or prior visit] : x-ray evidence of Osteoarthritis on this or prior visit [Alcohol] : alcohol [Betadine] : betadine [Ethyl Chloride sprayed topically] : ethyl chloride sprayed topically [___ cc    0.25%] : Bupivacaine (Marcaine) ~Vcc of 0.25%  [___ cc    40mg] : Triamcinolone (Kenalog) ~Vcc of 40 mg  [Call if redness, pain or fever occur] : call if redness, pain or fever occur [Apply ice for 15min out of every hour for the next 12-24 hours as tolerated] : apply ice for 15 minutes out of every hour for the next 12-24 hours as tolerated [Previous OTC use and PT nontherapeutic] : patient has tried OTC's including aspirin, Ibuprofen, Aleve, etc or prescription NSAIDS, and/or exercises at home and/or physical therapy without satisfactory response [Patient had decreased mobility in the joint] : patient had decreased mobility in the joint [Risks, benefits, alternatives discussed / Verbal consent obtained] : the risks benefits, and alternatives have been discussed, and verbal consent was obtained

## 2024-08-01 NOTE — HISTORY OF PRESENT ILLNESS
[Lower back] : lower back [Dull/Aching] : dull/aching [Sharp] : sharp [Shooting] : shooting [Tightness] : tightness [Constant] : constant [Leisure] : leisure [Sleep] : sleep [Social interactions] : social interactions [Injection therapy] : injection therapy [Sitting] : sitting [Standing] : standing [Walking] : walking [Stairs] : stairs [8] : 8 [3] : 3 [FreeTextEntry1] : 08/01/2024: follow up today for B/L lumbar MBB on 7/18 with 90% relief for the first 6-8 hours. Pain went down to 0/10 at rest. Pain over the right shoulder. had an injection in January with great relief. Had MRI at [facility]: 2/20/24: IMPRESSION: MRI of the right shoulder demonstrates:  1. Full-thickness near full width supraspinatus and full-thickness near full width infraspinatus tear with delamination and differential retraction on background tendinopathy as described in detail in the body of the report. Moderate muscle atrophy with intramuscular edema may be related to strain versus denervation injury. 2. Tendinosis with low-grade partial-thickness articular surface tear at the superior subscapularis with retraction. Mild muscle atrophy. 3. Biceps tendinosis with longitudinal split tear with distal reconstitution. Mild tenosynovitis. Perched proximal extra-articular segment. 4. Labral degeneration without displaced tear. 5. Mild glenohumeral arthropathy with small effusion decompressing into the subacromial-subdeltoid bursa with superimposed bursitis. 6. Mild acromioclavicular joint arthropathy with lateral downsloping acromion and subacromial spurring.  05/30/2024: follow up today after b/l lumbar MBB on 5/9/24 with relief the day of the injection and started again on Sunday. She finds she can stand longer and pain in the morning has improved.   04/25/2024: follow up today for LESI L5/S1on 4/4 with no relief. Pain is across the lower back.   01/04/2024: follow up today after LESI L5/S1 on 12/11/23. she had great relief for about 2 months.   8/17/23- fu for LESI L5-S1 on 8/4.  Had 80% relief. following. she has noticed after the injection that the swelling in the legs improved.   07/12/2023 : Patient presents for initial evaluation. She reports pain in the lower back with radiation down the posterior legs to the knees and to ankles. +n/t in the left big toe.  no weakness. no recent PT (financial constraints).   Subjective Weakness: No Numbness/Tingling: Yes Bladder/Bowel dysfunction: No Treatments Tried: no relief with NSAIDS, Tylenol  Attempted modalities for current pain complaint: See above: Medications: No  Injections: Yes- MYA L5/S1 4/10/23- Dr. Bennett 8/4/23 12/11/23- [facility] ROQUE L5/S14/4/24 b/l lumbar MBB 5/9/24, 7/18/24  Previous Spine Surgery: N/A  Imaging: MRI Lumbar Spine (3/23/23) Multilevel lower thoracic and lumbar degenerative disc disease and facet osteoarthrosis with grade I  degenerative spondylolisthesis at L4-5 and a dextroscoliosis. Disc herniations at T10-11 and T11-12 that do not appear to significantly compress the conus medullaris. Small disc herniation at L1-2 and a small bulge at L2-3 without stenosis or nerve root compression. Mild central stenosis at L3-4. Significant central stenosis at L4-5 as well as compression of the left L5 nerve root in the lateral recess. Modic type I endplate changes adjacent to the L4-5 disc.   [] : no [FreeTextEntry8] : getting out of bed  [TWNoteComboBox1] : 90%

## 2024-08-01 NOTE — ASSESSMENT
[FreeTextEntry1] : After discussing various treatment options with the patient including but not limited to oral medications, physical therapy, exercise, modalities as well as interventional spinal injections, we have decided with the following plan:  1) Intervention Injection Therapy: I personally reviewed the MRI/CT scan images and agree with the radiologist's report. The radiological findings were discussed with the patient. The risks, benefits, contents and alternatives to injection were explained in full to the patient. Risks outlined include but are not limited to infection,sepsis, bleeding, post-dural puncture headache, nerve damage, temporary increase in pain, syncopal episode, failure to resolve symptoms, allergic reaction, symptom recurrence, and elevation of blood sugar in diabetics. Cortisone may cause immunosuppression. Patient understands the risks. All questions were answered. After discussion of options, patient requested an injection. Information regarding the injection was given to the patient. Which medications to stop prior to the injection was explained to the patient as well.  Follow up in 1-2 weeks post injection for re-evaluation.  Continue Home exercises, stretching, activity modification, physical therapy, and conservative care.  Patient has lumbosacral axial pain that is consistent with facet joint pathology.  A diagnostic temporary block with local anesthetic  of the medial branch was performed and has resulted in at least a 80% reduction in pain for the duration of the specific local anesthetic effect.  The pain is not radicular and there is absence of nerve root compression.  There is no prior spinal fusion surgery at the level targeted.  The pain has failed to respond to three months of conservative therapy.  b/l lumbar RFA  2) CSI to the right shoulder

## 2024-08-01 NOTE — DATA REVIEWED
[Lumbar Spine] : lumbar spine [I independently reviewed and interpreted images and report] : I independently reviewed and interpreted images and report [MRI] : MRI [Right] : of the right [Shoulder] : shoulder [Report was reviewed and noted in the chart] : The report was reviewed and noted in the chart [I reviewed the films/CD and agree] : I reviewed the films/CD and agree

## 2024-08-01 NOTE — REASON FOR VISIT
[Follow-Up Visit] : a follow-up pain management visit [Other: _____] : [unfilled] [FreeTextEntry2] : pt is being seen for her f/u to inj

## 2024-08-29 ENCOUNTER — APPOINTMENT (OUTPATIENT)
Dept: PAIN MANAGEMENT | Facility: CLINIC | Age: 89
End: 2024-08-29

## 2024-09-06 ENCOUNTER — APPOINTMENT (OUTPATIENT)
Age: 89
End: 2024-09-06
Payer: MEDICARE

## 2024-09-06 PROCEDURE — 64636 DESTROY L/S FACET JNT ADDL: CPT | Mod: 59,LT

## 2024-09-06 PROCEDURE — 64635 DESTROY LUMB/SAC FACET JNT: CPT | Mod: 59,RT

## 2024-09-19 ENCOUNTER — APPOINTMENT (OUTPATIENT)
Dept: PAIN MANAGEMENT | Facility: CLINIC | Age: 89
End: 2024-09-19

## 2024-09-19 NOTE — PHYSICAL EXAM
[FreeTextEntry3] : has severe swelling b/l LE with redness  [] : no erythema [TWNoteComboBox7] : active forward flexion 110 degrees [de-identified] : active abduction 120 degrees

## 2024-09-19 NOTE — HISTORY OF PRESENT ILLNESS
[FreeTextEntry1] : 09/19/2024: follow up today for B/L lumbar RFA on 9/6 08/01/2024: follow up today for B/L lumbar MBB on 7/18 with 90% relief for the first 6-8 hours. Pain went down to 0/10 at rest. Pain over the right shoulder. had an injection in January with great relief. Had MRI at [facility]: 2/20/24: IMPRESSION: MRI of the right shoulder demonstrates:  1. Full-thickness near full width supraspinatus and full-thickness near full width infraspinatus tear with delamination and differential retraction on background tendinopathy as described in detail in the body of the report. Moderate muscle atrophy with intramuscular edema may be related to strain versus denervation injury. 2. Tendinosis with low-grade partial-thickness articular surface tear at the superior subscapularis with retraction. Mild muscle atrophy. 3. Biceps tendinosis with longitudinal split tear with distal reconstitution. Mild tenosynovitis. Perched proximal extra-articular segment. 4. Labral degeneration without displaced tear. 5. Mild glenohumeral arthropathy with small effusion decompressing into the subacromial-subdeltoid bursa with superimposed bursitis. 6. Mild acromioclavicular joint arthropathy with lateral downsloping acromion and subacromial spurring.  05/30/2024: follow up today after b/l lumbar MBB on 5/9/24 with relief the day of the injection and started again on Sunday. She finds she can stand longer and pain in the morning has improved.   04/25/2024: follow up today for LESI L5/S1on 4/4 with no relief. Pain is across the lower back.   01/04/2024: follow up today after LESI L5/S1 on 12/11/23. she had great relief for about 2 months.   8/17/23- fu for LESI L5-S1 on 8/4.  Had 80% relief. following. she has noticed after the injection that the swelling in the legs improved.   07/12/2023 : Patient presents for initial evaluation. She reports pain in the lower back with radiation down the posterior legs to the knees and to ankles. +n/t in the left big toe.  no weakness. no recent PT (financial constraints).   Subjective Weakness: No Numbness/Tingling: Yes Bladder/Bowel dysfunction: No Treatments Tried: no relief with NSAIDS, Tylenol  Attempted modalities for current pain complaint: See above: Medications: No  Injections: Yes- MYA L5/S1 4/10/23- Dr. Bennett 8/4/23 12/11/23- [facility] WAYI L5/S14/4/24 b/l lumbar MBB 5/9/24, 7/18/24 B/L lumbar RFA 9/6/24  Previous Spine Surgery: N/A  Imaging: MRI Lumbar Spine (3/23/23) Multilevel lower thoracic and lumbar degenerative disc disease and facet osteoarthrosis with grade I  degenerative spondylolisthesis at L4-5 and a dextroscoliosis. Disc herniations at T10-11 and T11-12 that do not appear to significantly compress the conus medullaris. Small disc herniation at L1-2 and a small bulge at L2-3 without stenosis or nerve root compression. Mild central stenosis at L3-4. Significant central stenosis at L4-5 as well as compression of the left L5 nerve root in the lateral recess. Modic type I endplate changes adjacent to the L4-5 disc.   [] : no [FreeTextEntry8] : getting out of bed  [TWNoteComboBox1] : 90%

## 2024-10-17 ENCOUNTER — APPOINTMENT (OUTPATIENT)
Dept: PAIN MANAGEMENT | Facility: CLINIC | Age: 89
End: 2024-10-17
Payer: MEDICARE

## 2024-10-17 VITALS — WEIGHT: 190 LBS | BODY MASS INDEX: 35.87 KG/M2 | HEIGHT: 61 IN

## 2024-10-17 DIAGNOSIS — M54.17 RADICULOPATHY, LUMBOSACRAL REGION: ICD-10-CM

## 2024-10-17 PROCEDURE — 99213 OFFICE O/P EST LOW 20 MIN: CPT

## 2024-10-17 RX ORDER — GABAPENTIN 300 MG/1
300 CAPSULE ORAL
Qty: 90 | Refills: 2 | Status: ACTIVE | COMMUNITY
Start: 2024-10-17 | End: 1900-01-01

## 2024-11-12 NOTE — ASSESSMENT
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 She has failed all forms of conservative treatment and is ready to proceed with TKA. Risks and benefits discussed. Hx of bleeding on Aspirin in her gums. Has a wedding in June and will plan for TKA after this. 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 IS on steroids 15 mg daily\par 7/26/22: 3 weeks postop had a recent episode of cellulitis and has been taking Abx. Having fevers/chills postop and will follow up with DAQUAN Lorenzo at the end of this week if this continues. may need readmission for more IV abx - no sign of knee infection -  Advised to follow up with PCP. \par 8/30/22: 2 months postop doing very well, cellulitis resolved, no signs of infection.  Cont PT.  May return tp anam/pilates when she feels able.\par 10/7/22: Right TKA doing well, still some swelling in leg but significant improvement and normal for course.  Acute onset sciatic pain in right leg started last night - will try NSAIDs for this and if no improvement will get further workup.\par 11/8/22: Right knee has good function, no signs of infection.  Mult neg dopplers to this point.  Has some trendelenberg gait - will work on gait training at PT as this is her biggest complaint at this time.\par 12/20/22: Knee doing well but persistent trendelenberg gait and weakness - MRI rigth hip eval abd tendon tear.\par \par 1/10/23: MRI right hip neg for abd tear - has significant OA but no groin pain - all lateral tenderness.  Bursa inj today and PT, reeval in 6 weeks.  Knee is improving.
Walk in

## 2024-11-21 ENCOUNTER — APPOINTMENT (OUTPATIENT)
Dept: PAIN MANAGEMENT | Facility: CLINIC | Age: 89
End: 2024-11-21
Payer: MEDICARE

## 2024-11-21 VITALS — HEIGHT: 61 IN | BODY MASS INDEX: 36.25 KG/M2 | WEIGHT: 192 LBS

## 2024-11-21 DIAGNOSIS — M54.17 RADICULOPATHY, LUMBOSACRAL REGION: ICD-10-CM

## 2024-11-21 PROCEDURE — 99214 OFFICE O/P EST MOD 30 MIN: CPT

## 2024-11-21 RX ORDER — DULOXETINE HYDROCHLORIDE 20 MG/1
20 CAPSULE, DELAYED RELEASE PELLETS ORAL TWICE DAILY
Qty: 60 | Refills: 2 | Status: ACTIVE | COMMUNITY
Start: 2024-11-21 | End: 1900-01-01

## 2024-11-21 RX ORDER — LIDOCAINE 5% 700 MG/1
5 PATCH TOPICAL
Qty: 90 | Refills: 2 | Status: ACTIVE | COMMUNITY
Start: 2024-11-21 | End: 1900-01-01

## 2025-01-09 ENCOUNTER — APPOINTMENT (OUTPATIENT)
Dept: PAIN MANAGEMENT | Facility: CLINIC | Age: 89
End: 2025-01-09
Payer: MEDICARE

## 2025-01-09 VITALS — HEIGHT: 61 IN | BODY MASS INDEX: 33.42 KG/M2 | WEIGHT: 177 LBS

## 2025-01-09 DIAGNOSIS — M54.17 RADICULOPATHY, LUMBOSACRAL REGION: ICD-10-CM

## 2025-01-09 DIAGNOSIS — M43.16 SPONDYLOLISTHESIS, LUMBAR REGION: ICD-10-CM

## 2025-01-09 PROCEDURE — 99214 OFFICE O/P EST MOD 30 MIN: CPT

## 2025-01-09 RX ORDER — TRAMADOL HYDROCHLORIDE 50 MG/1
50 TABLET, COATED ORAL
Qty: 42 | Refills: 0 | Status: ACTIVE | COMMUNITY
Start: 2025-01-09 | End: 1900-01-01

## 2025-01-14 ENCOUNTER — APPOINTMENT (OUTPATIENT)
Dept: MRI IMAGING | Facility: CLINIC | Age: 89
End: 2025-01-14
Payer: MEDICARE

## 2025-01-14 PROCEDURE — 72148 MRI LUMBAR SPINE W/O DYE: CPT

## 2025-01-20 ENCOUNTER — APPOINTMENT (OUTPATIENT)
Dept: ORTHOPEDIC SURGERY | Facility: CLINIC | Age: 89
End: 2025-01-20
Payer: MEDICARE

## 2025-01-20 VITALS — BODY MASS INDEX: 33.42 KG/M2 | HEIGHT: 61 IN | WEIGHT: 177 LBS

## 2025-01-20 DIAGNOSIS — M47.816 SPONDYLOSIS W/OUT MYELOPATHY OR RADICULOPATHY, LUMBAR REGION: ICD-10-CM

## 2025-01-20 DIAGNOSIS — M43.16 SPONDYLOLISTHESIS, LUMBAR REGION: ICD-10-CM

## 2025-01-20 PROCEDURE — 99214 OFFICE O/P EST MOD 30 MIN: CPT

## 2025-01-24 ENCOUNTER — APPOINTMENT (OUTPATIENT)
Dept: PAIN MANAGEMENT | Facility: CLINIC | Age: 89
End: 2025-01-24
Payer: MEDICARE

## 2025-01-24 VITALS — BODY MASS INDEX: 33.42 KG/M2 | HEIGHT: 61 IN | WEIGHT: 177 LBS

## 2025-01-24 DIAGNOSIS — M54.17 RADICULOPATHY, LUMBOSACRAL REGION: ICD-10-CM

## 2025-01-24 DIAGNOSIS — M48.062 SPINAL STENOSIS, LUMBAR REGION WITH NEUROGENIC CLAUDICATION: ICD-10-CM

## 2025-01-24 PROCEDURE — 99213 OFFICE O/P EST LOW 20 MIN: CPT

## 2025-02-20 NOTE — ED PROVIDER NOTE - CONSTITUTIONAL, MLM
Refill Decision Note   Fely Wetzel  is requesting a refill authorization.  Brief Assessment and Rationale for Refill:  Approve     Medication Therapy Plan:         Comments:     Note composed:2:02 PM 02/20/2025             Well appearing, awake, alert, oriented to person, place, time/situation and in no apparent distress. normal...

## 2025-02-28 ENCOUNTER — APPOINTMENT (OUTPATIENT)
Dept: PAIN MANAGEMENT | Facility: CLINIC | Age: 89
End: 2025-02-28

## 2025-02-28 VITALS — WEIGHT: 177 LBS | HEIGHT: 61 IN | BODY MASS INDEX: 33.42 KG/M2

## 2025-02-28 DIAGNOSIS — M54.2 CERVICALGIA: ICD-10-CM

## 2025-02-28 DIAGNOSIS — M54.81 OCCIPITAL NEURALGIA: ICD-10-CM

## 2025-02-28 PROCEDURE — 99213 OFFICE O/P EST LOW 20 MIN: CPT | Mod: 25

## 2025-02-28 PROCEDURE — 64405 NJX AA&/STRD GR OCPL NRV: CPT | Mod: LT

## 2025-02-28 PROCEDURE — J3490M: CUSTOM

## 2025-03-03 ENCOUNTER — APPOINTMENT (OUTPATIENT)
Dept: ORTHOPEDIC SURGERY | Facility: CLINIC | Age: 89
End: 2025-03-03

## 2025-03-03 VITALS — BODY MASS INDEX: 33.42 KG/M2 | WEIGHT: 177 LBS | HEIGHT: 61 IN

## 2025-03-03 DIAGNOSIS — M48.062 SPINAL STENOSIS, LUMBAR REGION WITH NEUROGENIC CLAUDICATION: ICD-10-CM

## 2025-03-03 DIAGNOSIS — M43.16 SPONDYLOLISTHESIS, LUMBAR REGION: ICD-10-CM

## 2025-03-03 DIAGNOSIS — S16.1XXA STRAIN OF MUSCLE, FASCIA AND TENDON AT NECK LEVEL, INITIAL ENCOUNTER: ICD-10-CM

## 2025-03-03 PROBLEM — S12.000A FRACTURE OF CERVICAL VERTEBRA, C1: Status: ACTIVE | Noted: 2025-03-03

## 2025-03-03 PROCEDURE — 99213 OFFICE O/P EST LOW 20 MIN: CPT

## 2025-03-03 PROCEDURE — 72040 X-RAY EXAM NECK SPINE 2-3 VW: CPT

## 2025-03-13 ENCOUNTER — APPOINTMENT (OUTPATIENT)
Dept: ORTHOPEDIC SURGERY | Facility: CLINIC | Age: 89
End: 2025-03-13

## 2025-03-13 VITALS — BODY MASS INDEX: 33.42 KG/M2 | WEIGHT: 177 LBS | HEIGHT: 61 IN

## 2025-03-13 DIAGNOSIS — S12.000A UNSPECIFIED DISPLACED FRACTURE OF FIRST CERVICAL VERTEBRA, INITIAL ENCOUNTER FOR CLOSED FRACTURE: ICD-10-CM

## 2025-03-13 PROCEDURE — 99214 OFFICE O/P EST MOD 30 MIN: CPT

## 2025-04-10 ENCOUNTER — APPOINTMENT (OUTPATIENT)
Dept: PAIN MANAGEMENT | Facility: CLINIC | Age: 89
End: 2025-04-10
Payer: MEDICARE

## 2025-04-10 VITALS — WEIGHT: 177 LBS | HEIGHT: 61 IN | BODY MASS INDEX: 33.42 KG/M2

## 2025-04-10 DIAGNOSIS — M54.2 CERVICALGIA: ICD-10-CM

## 2025-04-10 DIAGNOSIS — S12.000A UNSPECIFIED DISPLACED FRACTURE OF FIRST CERVICAL VERTEBRA, INITIAL ENCOUNTER FOR CLOSED FRACTURE: ICD-10-CM

## 2025-04-10 DIAGNOSIS — M48.062 SPINAL STENOSIS, LUMBAR REGION WITH NEUROGENIC CLAUDICATION: ICD-10-CM

## 2025-04-10 PROCEDURE — 99214 OFFICE O/P EST MOD 30 MIN: CPT

## 2025-04-10 RX ORDER — METOLAZONE 5 MG/1
5 TABLET ORAL
Refills: 0 | Status: ACTIVE | COMMUNITY

## 2025-05-09 ENCOUNTER — NON-APPOINTMENT (OUTPATIENT)
Age: 89
End: 2025-05-09

## 2025-05-12 ENCOUNTER — APPOINTMENT (OUTPATIENT)
Dept: ORTHOPEDIC SURGERY | Facility: CLINIC | Age: 89
End: 2025-05-12
Payer: MEDICARE

## 2025-05-12 VITALS — HEIGHT: 61 IN | WEIGHT: 177 LBS | BODY MASS INDEX: 33.42 KG/M2

## 2025-05-12 DIAGNOSIS — S12.000A UNSPECIFIED DISPLACED FRACTURE OF FIRST CERVICAL VERTEBRA, INITIAL ENCOUNTER FOR CLOSED FRACTURE: ICD-10-CM

## 2025-05-12 DIAGNOSIS — M54.2 CERVICALGIA: ICD-10-CM

## 2025-05-12 DIAGNOSIS — M47.816 SPONDYLOSIS W/OUT MYELOPATHY OR RADICULOPATHY, LUMBAR REGION: ICD-10-CM

## 2025-05-12 DIAGNOSIS — M54.17 RADICULOPATHY, LUMBOSACRAL REGION: ICD-10-CM

## 2025-05-12 DIAGNOSIS — M48.062 SPINAL STENOSIS, LUMBAR REGION WITH NEUROGENIC CLAUDICATION: ICD-10-CM

## 2025-05-12 DIAGNOSIS — M43.16 SPONDYLOLISTHESIS, LUMBAR REGION: ICD-10-CM

## 2025-05-12 PROCEDURE — 72040 X-RAY EXAM NECK SPINE 2-3 VW: CPT

## 2025-05-12 PROCEDURE — 99214 OFFICE O/P EST MOD 30 MIN: CPT | Mod: 25

## 2025-05-14 ENCOUNTER — OUTPATIENT (OUTPATIENT)
Dept: OUTPATIENT SERVICES | Facility: HOSPITAL | Age: 89
LOS: 1 days | End: 2025-05-14
Payer: MEDICARE

## 2025-05-14 VITALS
WEIGHT: 158.51 LBS | DIASTOLIC BLOOD PRESSURE: 51 MMHG | SYSTOLIC BLOOD PRESSURE: 101 MMHG | RESPIRATION RATE: 15 BRPM | TEMPERATURE: 98 F | HEART RATE: 78 BPM | OXYGEN SATURATION: 97 % | HEIGHT: 62 IN

## 2025-05-14 DIAGNOSIS — Z98.890 OTHER SPECIFIED POSTPROCEDURAL STATES: Chronic | ICD-10-CM

## 2025-05-14 DIAGNOSIS — M47.816 SPONDYLOSIS WITHOUT MYELOPATHY OR RADICULOPATHY, LUMBAR REGION: ICD-10-CM

## 2025-05-14 DIAGNOSIS — Z96.651 PRESENCE OF RIGHT ARTIFICIAL KNEE JOINT: Chronic | ICD-10-CM

## 2025-05-14 DIAGNOSIS — Z98.891 HISTORY OF UTERINE SCAR FROM PREVIOUS SURGERY: Chronic | ICD-10-CM

## 2025-05-14 DIAGNOSIS — Z87.19 PERSONAL HISTORY OF OTHER DISEASES OF THE DIGESTIVE SYSTEM: Chronic | ICD-10-CM

## 2025-05-14 DIAGNOSIS — Z01.818 ENCOUNTER FOR OTHER PREPROCEDURAL EXAMINATION: ICD-10-CM

## 2025-05-14 DIAGNOSIS — M54.16 RADICULOPATHY, LUMBAR REGION: ICD-10-CM

## 2025-05-14 DIAGNOSIS — Z90.710 ACQUIRED ABSENCE OF BOTH CERVIX AND UTERUS: Chronic | ICD-10-CM

## 2025-05-14 DIAGNOSIS — M43.16 SPONDYLOLISTHESIS, LUMBAR REGION: ICD-10-CM

## 2025-05-14 DIAGNOSIS — M54.17 RADICULOPATHY, LUMBOSACRAL REGION: ICD-10-CM

## 2025-05-14 DIAGNOSIS — E23.0 HYPOPITUITARISM: ICD-10-CM

## 2025-05-14 DIAGNOSIS — Z98.49 CATARACT EXTRACTION STATUS, UNSPECIFIED EYE: Chronic | ICD-10-CM

## 2025-05-14 LAB
ANION GAP SERPL CALC-SCNC: 6 MMOL/L — SIGNIFICANT CHANGE UP (ref 5–17)
BUN SERPL-MCNC: 64 MG/DL — HIGH (ref 7–23)
CALCIUM SERPL-MCNC: 9.6 MG/DL — SIGNIFICANT CHANGE UP (ref 8.5–10.1)
CHLORIDE SERPL-SCNC: 97 MMOL/L — SIGNIFICANT CHANGE UP (ref 96–108)
CO2 SERPL-SCNC: 32 MMOL/L — HIGH (ref 22–31)
CREAT SERPL-MCNC: 2.38 MG/DL — HIGH (ref 0.5–1.3)
EGFR: 19 ML/MIN/1.73M2 — LOW
EGFR: 19 ML/MIN/1.73M2 — LOW
GLUCOSE SERPL-MCNC: 106 MG/DL — HIGH (ref 70–99)
HCT VFR BLD CALC: 34 % — LOW (ref 34.5–45)
HGB BLD-MCNC: 11.4 G/DL — LOW (ref 11.5–15.5)
MCHC RBC-ENTMCNC: 29.5 PG — SIGNIFICANT CHANGE UP (ref 27–34)
MCHC RBC-ENTMCNC: 33.5 G/DL — SIGNIFICANT CHANGE UP (ref 32–36)
MCV RBC AUTO: 88.1 FL — SIGNIFICANT CHANGE UP (ref 80–100)
NRBC BLD AUTO-RTO: 0 /100 WBCS — SIGNIFICANT CHANGE UP (ref 0–0)
PLATELET # BLD AUTO: 183 K/UL — SIGNIFICANT CHANGE UP (ref 150–400)
POTASSIUM SERPL-MCNC: 3.3 MMOL/L — LOW (ref 3.5–5.3)
POTASSIUM SERPL-SCNC: 3.3 MMOL/L — LOW (ref 3.5–5.3)
RBC # BLD: 3.86 M/UL — SIGNIFICANT CHANGE UP (ref 3.8–5.2)
RBC # FLD: 13.7 % — SIGNIFICANT CHANGE UP (ref 10.3–14.5)
SODIUM SERPL-SCNC: 135 MMOL/L — SIGNIFICANT CHANGE UP (ref 135–145)
WBC # BLD: 7.78 K/UL — SIGNIFICANT CHANGE UP (ref 3.8–10.5)
WBC # FLD AUTO: 7.78 K/UL — SIGNIFICANT CHANGE UP (ref 3.8–10.5)

## 2025-05-14 PROCEDURE — 93010 ELECTROCARDIOGRAM REPORT: CPT

## 2025-05-14 NOTE — H&P PST ADULT - GENITOURINARY COMMENTS
1966, heavy bleeding s/p c/section, needed 22 units of blood. damaged the pitutary, now on hydrocortisone daily

## 2025-05-14 NOTE — H&P PST ADULT - PROBLEM SELECTOR PLAN 1
laminoforaminotomy of L4-5.  Pre op instructions:   Hold OTC supplements.   Cardiac eval needed  May take Tylenol for pain or headache if needed.    NPO after 11pm to the morning of surgery.   Antibacterial wash instructions given for the morning of surgery  Patient verbalized understanding.

## 2025-05-14 NOTE — H&P PST ADULT - REASON FOR ADMISSION
Provider Staff:  Action required for this patient     Please see care gap opportunities below in Care Due Message.    Thanks!  [facility]     Appointments      Date Provider   Last Visit   5/14/2024 Patti Price MD   Next Visit   Visit date not found Patti Price MD      Refill Decision Note   Shira Vega  is requesting a refill authorization.  Brief Assessment and Rationale for Refill:  Approve     Medication Therapy Plan:         Comments:     Note composed:3:30 AM 08/13/2024            low back pain

## 2025-05-14 NOTE — H&P PST ADULT - HISTORY OF PRESENT ILLNESS
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 On Tramadol for pain. came in a wheel chair accompanied by daughter. Pt A/A and oriented. Scheduled for laminoforaminotomy L4-5 . Pre op testing today.

## 2025-05-30 ENCOUNTER — APPOINTMENT (OUTPATIENT)
Dept: ORTHOPEDIC SURGERY | Facility: HOSPITAL | Age: 89
End: 2025-05-30
Payer: MEDICARE

## 2025-05-30 ENCOUNTER — TRANSCRIPTION ENCOUNTER (OUTPATIENT)
Age: 89
End: 2025-05-30

## 2025-05-30 ENCOUNTER — INPATIENT (INPATIENT)
Facility: HOSPITAL | Age: 89
LOS: 2 days | Discharge: NURSING, REHAB OR LONG-TERM CARE | End: 2025-06-02
Attending: ORTHOPAEDIC SURGERY | Admitting: ORTHOPAEDIC SURGERY

## 2025-05-30 VITALS
HEIGHT: 62 IN | TEMPERATURE: 98 F | OXYGEN SATURATION: 98 % | SYSTOLIC BLOOD PRESSURE: 124 MMHG | DIASTOLIC BLOOD PRESSURE: 75 MMHG | HEART RATE: 76 BPM | RESPIRATION RATE: 14 BRPM | WEIGHT: 158.51 LBS

## 2025-05-30 DIAGNOSIS — Z96.651 PRESENCE OF RIGHT ARTIFICIAL KNEE JOINT: Chronic | ICD-10-CM

## 2025-05-30 DIAGNOSIS — Z90.710 ACQUIRED ABSENCE OF BOTH CERVIX AND UTERUS: Chronic | ICD-10-CM

## 2025-05-30 DIAGNOSIS — Z98.49 CATARACT EXTRACTION STATUS, UNSPECIFIED EYE: Chronic | ICD-10-CM

## 2025-05-30 DIAGNOSIS — Z87.19 PERSONAL HISTORY OF OTHER DISEASES OF THE DIGESTIVE SYSTEM: Chronic | ICD-10-CM

## 2025-05-30 DIAGNOSIS — Z98.891 HISTORY OF UTERINE SCAR FROM PREVIOUS SURGERY: Chronic | ICD-10-CM

## 2025-05-30 DIAGNOSIS — Z98.890 OTHER SPECIFIED POSTPROCEDURAL STATES: Chronic | ICD-10-CM

## 2025-05-30 PROCEDURE — 63047 LAM FACETEC & FORAMOT LUMBAR: CPT

## 2025-05-30 DEVICE — SURGIFLO MATRIX WITH THROMBIN KIT: Type: IMPLANTABLE DEVICE | Status: FUNCTIONAL

## 2025-05-30 RX ORDER — HYDROCORTISONE 20 MG
10 TABLET ORAL DAILY
Refills: 0 | Status: DISCONTINUED | OUTPATIENT
Start: 2025-05-31 | End: 2025-06-02

## 2025-05-30 RX ORDER — OXYCODONE HYDROCHLORIDE 30 MG/1
10 TABLET ORAL EVERY 4 HOURS
Refills: 0 | Status: DISCONTINUED | OUTPATIENT
Start: 2025-05-30 | End: 2025-06-02

## 2025-05-30 RX ORDER — HYDROCORTISONE 20 MG
5 TABLET ORAL AT BEDTIME
Refills: 0 | Status: DISCONTINUED | OUTPATIENT
Start: 2025-05-31 | End: 2025-06-02

## 2025-05-30 RX ORDER — POLYETHYLENE GLYCOL 3350 17 G/17G
17 POWDER, FOR SOLUTION ORAL
Refills: 0 | Status: DISCONTINUED | OUTPATIENT
Start: 2025-05-30 | End: 2025-06-02

## 2025-05-30 RX ORDER — SENNA 187 MG
2 TABLET ORAL AT BEDTIME
Refills: 0 | Status: DISCONTINUED | OUTPATIENT
Start: 2025-05-30 | End: 2025-06-02

## 2025-05-30 RX ORDER — LEVOTHYROXINE SODIUM 300 MCG
100 TABLET ORAL DAILY
Refills: 0 | Status: DISCONTINUED | OUTPATIENT
Start: 2025-05-31 | End: 2025-06-02

## 2025-05-30 RX ORDER — ACETAMINOPHEN 500 MG/5ML
975 LIQUID (ML) ORAL EVERY 8 HOURS
Refills: 0 | Status: DISCONTINUED | OUTPATIENT
Start: 2025-05-30 | End: 2025-06-02

## 2025-05-30 RX ORDER — SODIUM CHLORIDE 9 G/1000ML
1000 INJECTION, SOLUTION INTRAVENOUS
Refills: 0 | Status: DISCONTINUED | OUTPATIENT
Start: 2025-05-30 | End: 2025-05-30

## 2025-05-30 RX ORDER — TRAMADOL HYDROCHLORIDE 50 MG/1
50 TABLET, FILM COATED ORAL EVERY 4 HOURS
Refills: 0 | Status: DISCONTINUED | OUTPATIENT
Start: 2025-05-30 | End: 2025-06-02

## 2025-05-30 RX ORDER — ONDANSETRON HCL/PF 4 MG/2 ML
4 VIAL (ML) INJECTION EVERY 6 HOURS
Refills: 0 | Status: DISCONTINUED | OUTPATIENT
Start: 2025-05-30 | End: 2025-06-02

## 2025-05-30 RX ORDER — LOSARTAN POTASSIUM 100 MG/1
100 TABLET, FILM COATED ORAL DAILY
Refills: 0 | Status: DISCONTINUED | OUTPATIENT
Start: 2025-05-31 | End: 2025-06-02

## 2025-05-30 RX ORDER — HYDROMORPHONE/SOD CHLOR,ISO/PF 2 MG/10 ML
1 SYRINGE (ML) INJECTION
Refills: 0 | Status: DISCONTINUED | OUTPATIENT
Start: 2025-05-30 | End: 2025-05-30

## 2025-05-30 RX ORDER — HYDROMORPHONE/SOD CHLOR,ISO/PF 2 MG/10 ML
0.5 SYRINGE (ML) INJECTION EVERY 4 HOURS
Refills: 0 | Status: DISCONTINUED | OUTPATIENT
Start: 2025-05-30 | End: 2025-05-30

## 2025-05-30 RX ORDER — SODIUM CHLORIDE 9 G/1000ML
1000 INJECTION, SOLUTION INTRAVENOUS
Refills: 0 | Status: DISCONTINUED | OUTPATIENT
Start: 2025-05-30 | End: 2025-06-02

## 2025-05-30 RX ORDER — HYDROMORPHONE/SOD CHLOR,ISO/PF 2 MG/10 ML
0.5 SYRINGE (ML) INJECTION
Refills: 0 | Status: DISCONTINUED | OUTPATIENT
Start: 2025-05-30 | End: 2025-05-30

## 2025-05-30 RX ORDER — LOSARTAN POTASSIUM AND HYDROCHLOROTHIAZIDE 12.5; 5 MG/1; MG/1
1 TABLET ORAL
Refills: 0 | DISCHARGE

## 2025-05-30 RX ORDER — ONDANSETRON HCL/PF 4 MG/2 ML
4 VIAL (ML) INJECTION ONCE
Refills: 0 | Status: DISCONTINUED | OUTPATIENT
Start: 2025-05-30 | End: 2025-05-30

## 2025-05-30 RX ORDER — OXYCODONE HYDROCHLORIDE 30 MG/1
5 TABLET ORAL EVERY 4 HOURS
Refills: 0 | Status: DISCONTINUED | OUTPATIENT
Start: 2025-05-30 | End: 2025-06-02

## 2025-05-30 RX ORDER — CEFAZOLIN SODIUM IN 0.9 % NACL 3 G/100 ML
2000 INTRAVENOUS SOLUTION, PIGGYBACK (ML) INTRAVENOUS EVERY 8 HOURS
Refills: 0 | Status: COMPLETED | OUTPATIENT
Start: 2025-05-31 | End: 2025-05-31

## 2025-05-30 RX ADMIN — Medication 975 MILLIGRAM(S): at 22:04

## 2025-05-30 RX ADMIN — SODIUM CHLORIDE 125 MILLILITER(S): 9 INJECTION, SOLUTION INTRAVENOUS at 18:23

## 2025-05-30 RX ADMIN — Medication 1 MILLIGRAM(S): at 18:23

## 2025-05-30 RX ADMIN — Medication 2 TABLET(S): at 21:04

## 2025-05-30 RX ADMIN — Medication 100 MILLIGRAM(S): at 23:57

## 2025-05-30 RX ADMIN — Medication 975 MILLIGRAM(S): at 21:04

## 2025-05-30 RX ADMIN — Medication 3 MILLILITER(S): at 21:09

## 2025-05-30 NOTE — PHYSICAL THERAPY INITIAL EVALUATION ADULT - ADDITIONAL COMMENTS
Pt reports she lives in pvt co-op w/ 2 threshold JESSICA. Once inside, no additional steps. Pt owns QC which she uses at baseline. Pt house is unable to fit RW's. Family at bedside reports pt is a horder and house has high fall risk potential.

## 2025-05-30 NOTE — PHYSICAL THERAPY INITIAL EVALUATION ADULT - MANUAL MUSCLE TESTING RESULTS, REHAB EVAL
L/s decreased strength secondary to post surgical pain and inflammation. 3/5 MMT grade./grossly assessed due to

## 2025-05-30 NOTE — ASU PATIENT PROFILE, ADULT - FALL HARM RISK - HARM RISK INTERVENTIONS

## 2025-05-30 NOTE — PHYSICAL THERAPY INITIAL EVALUATION ADULT - DISCHARGE PLANNER MADE AWARE
Attempted to call patient spouse to inform them of the referrral to urology for the nephrosis.     
no

## 2025-05-30 NOTE — PHYSICAL THERAPY INITIAL EVALUATION ADULT - RANGE OF MOTION EXAMINATION, REHAB EVAL
L/s decreased ROM secondary to post surgical pain and inflammation./bilateral upper extremity ROM was WFL (within functional limits)/bilateral lower extremity ROM was WFL (within functional limits)/deficits as listed below

## 2025-05-30 NOTE — PHYSICAL THERAPY INITIAL EVALUATION ADULT - IMPAIRMENTS FOUND, PT EVAL
The patient's assessment was reviewed with Dr. Roque and a collaborative plan of care was established. aerobic capacity/endurance/gait, locomotion, and balance/gross motor/joint integrity and mobility/muscle strength/ROM

## 2025-05-30 NOTE — PHYSICAL THERAPY INITIAL EVALUATION ADULT - STRENGTHENING, PT EVAL
Pt will improve L/s strength to 3+/5; enough to improve transfer, stair, and gait efficiency within x4 weeks.

## 2025-05-30 NOTE — PHYSICAL THERAPY INITIAL EVALUATION ADULT - GAIT DEVIATIONS NOTED, PT EVAL
decreased gabriela/increased time in double stance/decreased velocity of limb motion/decreased step length/decreased stride length/decreased weight-shifting ability

## 2025-05-30 NOTE — PHYSICAL THERAPY INITIAL EVALUATION ADULT - NSPTDMEREC_GEN_A_CORE
pt owns QC, raised toilet seat, The patient has a mobility limitation that significantly impairs their ability to participate independently in mobility-related activities of daily living (MRADLs) in the home. This functional deficit can be sufficiently resolved with the use of 2-wheeled rolling walker.

## 2025-05-30 NOTE — PATIENT PROFILE ADULT - FUNCTIONAL ASSESSMENT - BASIC MOBILITY 6.
3-calculated by average/Not able to assess (calculate score using [facility] averaging method)

## 2025-05-30 NOTE — PHYSICAL THERAPY INITIAL EVALUATION ADULT - ORIENTATION, REHAB EVAL
This is a recent snapshot of the patient's [facility] medical record.  For current drug dose and complete information and questions, call [phone] or In Basket pool, [facility] home infusion ([id number])  CSN Number:  [id number]      
oriented to person, place, time and situation

## 2025-05-30 NOTE — PHYSICAL THERAPY INITIAL EVALUATION ADULT - GENERAL OBSERVATIONS, REHAB EVAL
Pt encountered semi-fowlers in bed w/ NAD, AxOx4, +surgical dressing C/D/I, +SCD, +IV, +bedalarm; family present, and reports 5/10 pain at rest and 5/10 w/ activity.

## 2025-05-31 LAB
ANION GAP SERPL CALC-SCNC: 4 MMOL/L — LOW (ref 5–17)
BASOPHILS # BLD AUTO: 0.05 K/UL — SIGNIFICANT CHANGE UP (ref 0–0.2)
BASOPHILS NFR BLD AUTO: 0.4 % — SIGNIFICANT CHANGE UP (ref 0–2)
BUN SERPL-MCNC: 36 MG/DL — HIGH (ref 7–23)
CALCIUM SERPL-MCNC: 8.6 MG/DL — SIGNIFICANT CHANGE UP (ref 8.5–10.1)
CHLORIDE SERPL-SCNC: 103 MMOL/L — SIGNIFICANT CHANGE UP (ref 96–108)
CO2 SERPL-SCNC: 28 MMOL/L — SIGNIFICANT CHANGE UP (ref 22–31)
CREAT SERPL-MCNC: 1.89 MG/DL — HIGH (ref 0.5–1.3)
EGFR: 25 ML/MIN/1.73M2 — LOW
EGFR: 25 ML/MIN/1.73M2 — LOW
EOSINOPHIL # BLD AUTO: 0.04 K/UL — SIGNIFICANT CHANGE UP (ref 0–0.5)
EOSINOPHIL NFR BLD AUTO: 0.3 % — SIGNIFICANT CHANGE UP (ref 0–6)
GLUCOSE SERPL-MCNC: 103 MG/DL — HIGH (ref 70–99)
HCT VFR BLD CALC: 30.8 % — LOW (ref 34.5–45)
HGB BLD-MCNC: 9.8 G/DL — LOW (ref 11.5–15.5)
IMM GRANULOCYTES NFR BLD AUTO: 0.6 % — SIGNIFICANT CHANGE UP (ref 0–0.9)
LYMPHOCYTES # BLD AUTO: 0.86 K/UL — LOW (ref 1–3.3)
LYMPHOCYTES # BLD AUTO: 7.5 % — LOW (ref 13–44)
MCHC RBC-ENTMCNC: 29 PG — SIGNIFICANT CHANGE UP (ref 27–34)
MCHC RBC-ENTMCNC: 31.8 G/DL — LOW (ref 32–36)
MCV RBC AUTO: 91.1 FL — SIGNIFICANT CHANGE UP (ref 80–100)
MONOCYTES # BLD AUTO: 0.83 K/UL — SIGNIFICANT CHANGE UP (ref 0–0.9)
MONOCYTES NFR BLD AUTO: 7.2 % — SIGNIFICANT CHANGE UP (ref 2–14)
NEUTROPHILS # BLD AUTO: 9.67 K/UL — HIGH (ref 1.8–7.4)
NEUTROPHILS NFR BLD AUTO: 84 % — HIGH (ref 43–77)
NRBC BLD AUTO-RTO: 0 /100 WBCS — SIGNIFICANT CHANGE UP (ref 0–0)
PLATELET # BLD AUTO: 192 K/UL — SIGNIFICANT CHANGE UP (ref 150–400)
POTASSIUM SERPL-MCNC: 3.7 MMOL/L — SIGNIFICANT CHANGE UP (ref 3.5–5.3)
POTASSIUM SERPL-SCNC: 3.7 MMOL/L — SIGNIFICANT CHANGE UP (ref 3.5–5.3)
RBC # BLD: 3.38 M/UL — LOW (ref 3.8–5.2)
RBC # FLD: 14.1 % — SIGNIFICANT CHANGE UP (ref 10.3–14.5)
SODIUM SERPL-SCNC: 135 MMOL/L — SIGNIFICANT CHANGE UP (ref 135–145)
WBC # BLD: 11.52 K/UL — HIGH (ref 3.8–10.5)
WBC # FLD AUTO: 11.52 K/UL — HIGH (ref 3.8–10.5)

## 2025-05-31 RX ADMIN — Medication 3 MILLILITER(S): at 21:55

## 2025-05-31 RX ADMIN — OXYCODONE HYDROCHLORIDE 5 MILLIGRAM(S): 30 TABLET ORAL at 13:06

## 2025-05-31 RX ADMIN — OXYCODONE HYDROCHLORIDE 5 MILLIGRAM(S): 30 TABLET ORAL at 14:05

## 2025-05-31 RX ADMIN — Medication 5 MILLIGRAM(S): at 21:07

## 2025-05-31 RX ADMIN — Medication 3 MILLILITER(S): at 13:02

## 2025-05-31 RX ADMIN — POLYETHYLENE GLYCOL 3350 17 GRAM(S): 17 POWDER, FOR SOLUTION ORAL at 06:10

## 2025-05-31 RX ADMIN — Medication 975 MILLIGRAM(S): at 14:05

## 2025-05-31 RX ADMIN — Medication 975 MILLIGRAM(S): at 06:10

## 2025-05-31 RX ADMIN — OXYCODONE HYDROCHLORIDE 5 MILLIGRAM(S): 30 TABLET ORAL at 02:48

## 2025-05-31 RX ADMIN — SODIUM CHLORIDE 75 MILLILITER(S): 9 INJECTION, SOLUTION INTRAVENOUS at 13:56

## 2025-05-31 RX ADMIN — OXYCODONE HYDROCHLORIDE 10 MILLIGRAM(S): 30 TABLET ORAL at 21:31

## 2025-05-31 RX ADMIN — Medication 975 MILLIGRAM(S): at 22:07

## 2025-05-31 RX ADMIN — Medication 2 TABLET(S): at 21:07

## 2025-05-31 RX ADMIN — Medication 975 MILLIGRAM(S): at 13:06

## 2025-05-31 RX ADMIN — POLYETHYLENE GLYCOL 3350 17 GRAM(S): 17 POWDER, FOR SOLUTION ORAL at 17:51

## 2025-05-31 RX ADMIN — Medication 10 MILLIGRAM(S): at 06:10

## 2025-05-31 RX ADMIN — Medication 100 MILLIGRAM(S): at 08:02

## 2025-05-31 RX ADMIN — Medication 100 MICROGRAM(S): at 05:02

## 2025-05-31 RX ADMIN — OXYCODONE HYDROCHLORIDE 10 MILLIGRAM(S): 30 TABLET ORAL at 22:31

## 2025-05-31 RX ADMIN — Medication 500 MILLILITER(S): at 12:39

## 2025-05-31 RX ADMIN — OXYCODONE HYDROCHLORIDE 5 MILLIGRAM(S): 30 TABLET ORAL at 01:48

## 2025-05-31 RX ADMIN — Medication 3 MILLILITER(S): at 05:44

## 2025-05-31 RX ADMIN — Medication 975 MILLIGRAM(S): at 07:10

## 2025-05-31 RX ADMIN — Medication 975 MILLIGRAM(S): at 21:07

## 2025-05-31 RX ADMIN — Medication 40 MILLIEQUIVALENT(S): at 12:01

## 2025-06-01 LAB
ANION GAP SERPL CALC-SCNC: 4 MMOL/L — LOW (ref 5–17)
BASOPHILS # BLD AUTO: 0.05 K/UL — SIGNIFICANT CHANGE UP (ref 0–0.2)
BASOPHILS NFR BLD AUTO: 0.5 % — SIGNIFICANT CHANGE UP (ref 0–2)
BUN SERPL-MCNC: 37 MG/DL — HIGH (ref 7–23)
CALCIUM SERPL-MCNC: 8.6 MG/DL — SIGNIFICANT CHANGE UP (ref 8.5–10.1)
CHLORIDE SERPL-SCNC: 106 MMOL/L — SIGNIFICANT CHANGE UP (ref 96–108)
CO2 SERPL-SCNC: 28 MMOL/L — SIGNIFICANT CHANGE UP (ref 22–31)
CREAT SERPL-MCNC: 1.66 MG/DL — HIGH (ref 0.5–1.3)
EGFR: 29 ML/MIN/1.73M2 — LOW
EGFR: 29 ML/MIN/1.73M2 — LOW
EOSINOPHIL # BLD AUTO: 0.25 K/UL — SIGNIFICANT CHANGE UP (ref 0–0.5)
EOSINOPHIL NFR BLD AUTO: 2.7 % — SIGNIFICANT CHANGE UP (ref 0–6)
GLUCOSE SERPL-MCNC: 113 MG/DL — HIGH (ref 70–99)
HCT VFR BLD CALC: 29.9 % — LOW (ref 34.5–45)
HGB BLD-MCNC: 9.5 G/DL — LOW (ref 11.5–15.5)
IMM GRANULOCYTES NFR BLD AUTO: 0.4 % — SIGNIFICANT CHANGE UP (ref 0–0.9)
LYMPHOCYTES # BLD AUTO: 1.21 K/UL — SIGNIFICANT CHANGE UP (ref 1–3.3)
LYMPHOCYTES # BLD AUTO: 13 % — SIGNIFICANT CHANGE UP (ref 13–44)
MCHC RBC-ENTMCNC: 29.3 PG — SIGNIFICANT CHANGE UP (ref 27–34)
MCHC RBC-ENTMCNC: 31.8 G/DL — LOW (ref 32–36)
MCV RBC AUTO: 92.3 FL — SIGNIFICANT CHANGE UP (ref 80–100)
MONOCYTES # BLD AUTO: 1.02 K/UL — HIGH (ref 0–0.9)
MONOCYTES NFR BLD AUTO: 11 % — SIGNIFICANT CHANGE UP (ref 2–14)
NEUTROPHILS # BLD AUTO: 6.74 K/UL — SIGNIFICANT CHANGE UP (ref 1.8–7.4)
NEUTROPHILS NFR BLD AUTO: 72.4 % — SIGNIFICANT CHANGE UP (ref 43–77)
NRBC BLD AUTO-RTO: 0 /100 WBCS — SIGNIFICANT CHANGE UP (ref 0–0)
PLATELET # BLD AUTO: 148 K/UL — LOW (ref 150–400)
POTASSIUM SERPL-MCNC: 4.1 MMOL/L — SIGNIFICANT CHANGE UP (ref 3.5–5.3)
POTASSIUM SERPL-SCNC: 4.1 MMOL/L — SIGNIFICANT CHANGE UP (ref 3.5–5.3)
RBC # BLD: 3.24 M/UL — LOW (ref 3.8–5.2)
RBC # FLD: 14.4 % — SIGNIFICANT CHANGE UP (ref 10.3–14.5)
SODIUM SERPL-SCNC: 138 MMOL/L — SIGNIFICANT CHANGE UP (ref 135–145)
WBC # BLD: 9.31 K/UL — SIGNIFICANT CHANGE UP (ref 3.8–10.5)
WBC # FLD AUTO: 9.31 K/UL — SIGNIFICANT CHANGE UP (ref 3.8–10.5)

## 2025-06-01 RX ORDER — BISACODYL 5 MG
5 TABLET, DELAYED RELEASE (ENTERIC COATED) ORAL EVERY 12 HOURS
Refills: 0 | Status: DISCONTINUED | OUTPATIENT
Start: 2025-06-01 | End: 2025-06-02

## 2025-06-01 RX ADMIN — Medication 3 MILLILITER(S): at 14:41

## 2025-06-01 RX ADMIN — Medication 100 MICROGRAM(S): at 05:10

## 2025-06-01 RX ADMIN — Medication 975 MILLIGRAM(S): at 22:33

## 2025-06-01 RX ADMIN — Medication 975 MILLIGRAM(S): at 07:00

## 2025-06-01 RX ADMIN — Medication 10 MILLIGRAM(S): at 06:00

## 2025-06-01 RX ADMIN — Medication 2 TABLET(S): at 21:34

## 2025-06-01 RX ADMIN — Medication 5 MILLIGRAM(S): at 17:32

## 2025-06-01 RX ADMIN — Medication 975 MILLIGRAM(S): at 14:37

## 2025-06-01 RX ADMIN — Medication 5 MILLIGRAM(S): at 21:34

## 2025-06-01 RX ADMIN — Medication 40 MILLIEQUIVALENT(S): at 11:10

## 2025-06-01 RX ADMIN — Medication 10 MILLIGRAM(S): at 19:44

## 2025-06-01 RX ADMIN — OXYCODONE HYDROCHLORIDE 10 MILLIGRAM(S): 30 TABLET ORAL at 06:54

## 2025-06-01 RX ADMIN — Medication 975 MILLIGRAM(S): at 21:33

## 2025-06-01 RX ADMIN — Medication 975 MILLIGRAM(S): at 13:37

## 2025-06-01 RX ADMIN — Medication 3 MILLILITER(S): at 21:58

## 2025-06-01 RX ADMIN — Medication 3 MILLILITER(S): at 05:46

## 2025-06-01 RX ADMIN — POLYETHYLENE GLYCOL 3350 17 GRAM(S): 17 POWDER, FOR SOLUTION ORAL at 17:32

## 2025-06-01 RX ADMIN — OXYCODONE HYDROCHLORIDE 10 MILLIGRAM(S): 30 TABLET ORAL at 07:54

## 2025-06-01 RX ADMIN — POLYETHYLENE GLYCOL 3350 17 GRAM(S): 17 POWDER, FOR SOLUTION ORAL at 06:01

## 2025-06-01 RX ADMIN — Medication 975 MILLIGRAM(S): at 06:00

## 2025-06-02 ENCOUNTER — TRANSCRIPTION ENCOUNTER (OUTPATIENT)
Age: 89
End: 2025-06-02

## 2025-06-02 VITALS
OXYGEN SATURATION: 96 % | TEMPERATURE: 99 F | RESPIRATION RATE: 16 BRPM | HEART RATE: 69 BPM | SYSTOLIC BLOOD PRESSURE: 115 MMHG | DIASTOLIC BLOOD PRESSURE: 65 MMHG

## 2025-06-02 LAB
ANION GAP SERPL CALC-SCNC: 4 MMOL/L — LOW (ref 5–17)
BASOPHILS # BLD AUTO: 0.05 K/UL — SIGNIFICANT CHANGE UP (ref 0–0.2)
BASOPHILS NFR BLD AUTO: 0.6 % — SIGNIFICANT CHANGE UP (ref 0–2)
BUN SERPL-MCNC: 38 MG/DL — HIGH (ref 7–23)
CALCIUM SERPL-MCNC: 8.9 MG/DL — SIGNIFICANT CHANGE UP (ref 8.5–10.1)
CHLORIDE SERPL-SCNC: 108 MMOL/L — SIGNIFICANT CHANGE UP (ref 96–108)
CO2 SERPL-SCNC: 27 MMOL/L — SIGNIFICANT CHANGE UP (ref 22–31)
CREAT SERPL-MCNC: 1.47 MG/DL — HIGH (ref 0.5–1.3)
EGFR: 34 ML/MIN/1.73M2 — LOW
EGFR: 34 ML/MIN/1.73M2 — LOW
EOSINOPHIL # BLD AUTO: 0.24 K/UL — SIGNIFICANT CHANGE UP (ref 0–0.5)
EOSINOPHIL NFR BLD AUTO: 2.7 % — SIGNIFICANT CHANGE UP (ref 0–6)
GLUCOSE SERPL-MCNC: 91 MG/DL — SIGNIFICANT CHANGE UP (ref 70–99)
HCT VFR BLD CALC: 31.6 % — LOW (ref 34.5–45)
HGB BLD-MCNC: 9.8 G/DL — LOW (ref 11.5–15.5)
IMM GRANULOCYTES NFR BLD AUTO: 0.2 % — SIGNIFICANT CHANGE UP (ref 0–0.9)
LYMPHOCYTES # BLD AUTO: 1.38 K/UL — SIGNIFICANT CHANGE UP (ref 1–3.3)
LYMPHOCYTES # BLD AUTO: 15.3 % — SIGNIFICANT CHANGE UP (ref 13–44)
MCHC RBC-ENTMCNC: 28.5 PG — SIGNIFICANT CHANGE UP (ref 27–34)
MCHC RBC-ENTMCNC: 31 G/DL — LOW (ref 32–36)
MCV RBC AUTO: 91.9 FL — SIGNIFICANT CHANGE UP (ref 80–100)
MONOCYTES # BLD AUTO: 0.9 K/UL — SIGNIFICANT CHANGE UP (ref 0–0.9)
MONOCYTES NFR BLD AUTO: 10 % — SIGNIFICANT CHANGE UP (ref 2–14)
NEUTROPHILS # BLD AUTO: 6.44 K/UL — SIGNIFICANT CHANGE UP (ref 1.8–7.4)
NEUTROPHILS NFR BLD AUTO: 71.2 % — SIGNIFICANT CHANGE UP (ref 43–77)
NRBC BLD AUTO-RTO: 0 /100 WBCS — SIGNIFICANT CHANGE UP (ref 0–0)
PLATELET # BLD AUTO: 168 K/UL — SIGNIFICANT CHANGE UP (ref 150–400)
POTASSIUM SERPL-MCNC: 4.6 MMOL/L — SIGNIFICANT CHANGE UP (ref 3.5–5.3)
POTASSIUM SERPL-SCNC: 4.6 MMOL/L — SIGNIFICANT CHANGE UP (ref 3.5–5.3)
RBC # BLD: 3.44 M/UL — LOW (ref 3.8–5.2)
RBC # FLD: 14.4 % — SIGNIFICANT CHANGE UP (ref 10.3–14.5)
SODIUM SERPL-SCNC: 139 MMOL/L — SIGNIFICANT CHANGE UP (ref 135–145)
WBC # BLD: 9.03 K/UL — SIGNIFICANT CHANGE UP (ref 3.8–10.5)
WBC # FLD AUTO: 9.03 K/UL — SIGNIFICANT CHANGE UP (ref 3.8–10.5)

## 2025-06-02 RX ORDER — LACTULOSE 10 G/15ML
15 SOLUTION ORAL
Refills: 0 | Status: DISCONTINUED | OUTPATIENT
Start: 2025-06-02 | End: 2025-06-02

## 2025-06-02 RX ORDER — SENNA 187 MG
2 TABLET ORAL
Qty: 0 | Refills: 0 | DISCHARGE
Start: 2025-06-02

## 2025-06-02 RX ORDER — LEVOTHYROXINE SODIUM 300 MCG
1 TABLET ORAL
Refills: 0 | DISCHARGE

## 2025-06-02 RX ORDER — BISACODYL 5 MG
1 TABLET, DELAYED RELEASE (ENTERIC COATED) ORAL
Qty: 0 | Refills: 0 | DISCHARGE
Start: 2025-06-02

## 2025-06-02 RX ORDER — HYDROCORTISONE 20 MG
1 TABLET ORAL
Qty: 0 | Refills: 0 | DISCHARGE
Start: 2025-06-02

## 2025-06-02 RX ORDER — OXYCODONE HYDROCHLORIDE 30 MG/1
1 TABLET ORAL
Qty: 0 | Refills: 0 | DISCHARGE
Start: 2025-06-02

## 2025-06-02 RX ORDER — ACETAMINOPHEN 500 MG/5ML
3 LIQUID (ML) ORAL
Qty: 0 | Refills: 0 | DISCHARGE
Start: 2025-06-02

## 2025-06-02 RX ORDER — TRAMADOL HYDROCHLORIDE 50 MG/1
1 TABLET, FILM COATED ORAL
Qty: 0 | Refills: 0 | DISCHARGE
Start: 2025-06-02

## 2025-06-02 RX ORDER — HYDROCORTISONE 20 MG
1 TABLET ORAL
Refills: 0 | DISCHARGE

## 2025-06-02 RX ORDER — POLYETHYLENE GLYCOL 3350 17 G/17G
17 POWDER, FOR SOLUTION ORAL
Qty: 0 | Refills: 0 | DISCHARGE
Start: 2025-06-02

## 2025-06-02 RX ORDER — LEVOTHYROXINE SODIUM 300 MCG
1 TABLET ORAL
Qty: 0 | Refills: 0 | DISCHARGE
Start: 2025-06-02

## 2025-06-02 RX ADMIN — TRAMADOL HYDROCHLORIDE 50 MILLIGRAM(S): 50 TABLET, FILM COATED ORAL at 03:48

## 2025-06-02 RX ADMIN — LOSARTAN POTASSIUM 100 MILLIGRAM(S): 100 TABLET, FILM COATED ORAL at 05:47

## 2025-06-02 RX ADMIN — Medication 975 MILLIGRAM(S): at 06:44

## 2025-06-02 RX ADMIN — Medication 40 MILLIEQUIVALENT(S): at 12:12

## 2025-06-02 RX ADMIN — LACTULOSE 15 GRAM(S): 10 SOLUTION ORAL at 12:12

## 2025-06-02 RX ADMIN — Medication 10 MILLIGRAM(S): at 06:00

## 2025-06-02 RX ADMIN — Medication 975 MILLIGRAM(S): at 13:42

## 2025-06-02 RX ADMIN — Medication 3 MILLILITER(S): at 13:11

## 2025-06-02 RX ADMIN — TRAMADOL HYDROCHLORIDE 50 MILLIGRAM(S): 50 TABLET, FILM COATED ORAL at 04:48

## 2025-06-02 RX ADMIN — Medication 975 MILLIGRAM(S): at 14:40

## 2025-06-02 RX ADMIN — Medication 975 MILLIGRAM(S): at 05:44

## 2025-06-02 RX ADMIN — Medication 100 MICROGRAM(S): at 05:19

## 2025-06-02 RX ADMIN — Medication 3 MILLILITER(S): at 05:14

## 2025-06-02 RX ADMIN — POLYETHYLENE GLYCOL 3350 17 GRAM(S): 17 POWDER, FOR SOLUTION ORAL at 05:47

## 2025-06-02 NOTE — DISCHARGE NOTE PROVIDER - CARE PROVIDER_API CALL
Gerard Roldan  Orthopaedic Surgery  [address]  [place] [id number]  Phone: [phone]  Fax: [phone]  Follow Up Time:

## 2025-06-02 NOTE — DISCHARGE NOTE PROVIDER - NSDCMRMEDTOKEN_GEN_ALL_CORE_FT
acetaminophen 325 mg oral tablet: 3 tab(s) orally every 8 hours  ascorbic acid 500 mg oral tablet: 1 tab(s) orally once a day  bisacodyl 5 mg oral delayed release tablet: 1 tab(s) orally every 12 hours As needed Constipation  hydrocortisone 10 mg oral tablet: 1 tab(s) orally once a day  hydrocortisone 5 mg oral tablet: 1 tab(s) orally once a day (at bedtime)  levothyroxine 100 mcg (0.1 mg) oral tablet: 1 tab(s) orally once a day  losartan-hydrochlorothiazide 100 mg-12.5 mg oral tablet: 1 tab(s) orally once a day  metOLazone 5 mg oral tablet: 1 tab(s) orally once a day  Multiple Vitamins oral tablet: 1 tab(s) orally once a day  oxyCODONE 10 mg oral tablet: 1 tab(s) orally every 4 hours As needed Severe Pain (7 - 10)  oxyCODONE 5 mg oral tablet: 1 tab(s) orally every 4 hours As needed Moderate Pain (4 - 6)  polyethylene glycol 3350 oral powder for reconstitution: 17 gram(s) orally 2 times a day  potassium chloride 20 mEq oral powder for reconstitution: 2 packet(s) orally once a day  Repatha 140 mg/mL subcutaneous solution: subcutaneous 2 times a month  senna leaf extract oral tablet: 2 tab(s) orally once a day (at bedtime)  traMADol 50 mg oral tablet: 1 tab(s) orally every 4 hours As needed Mild Pain (1 - 3)

## 2025-06-02 NOTE — DISCHARGE NOTE PROVIDER - NSDCFUADDAPPT_GEN_ALL_CORE_FT

## 2025-06-02 NOTE — DISCHARGE NOTE PROVIDER - NSDCFUSCHEDAPPT_GEN_ALL_CORE_FT
[facility]  [facility] [id number]  Scheduled Appointment: 06/05/2025

## 2025-06-02 NOTE — DISCHARGE NOTE PROVIDER - HOSPITAL COURSE
89yFemale with history of radiculopathy presenting for Gene L4-5 with Dr. Roldan on 5/30/25. Risk and benefits of surgery were explained to the patient. The patient understood and agreed to proceed with surgery. Patient underwent the procedure with no intraoperative complications. Pt was brought in stable condition to the PACU. Once stable in PACU, pt was brought to the floor. During hospital stay pt was followed by Medicine, physical therapy, Home Care during this admission. Pt is stable for discharge to rehab

## 2025-06-02 NOTE — PROGRESS NOTE ADULT - SUBJECTIVE AND OBJECTIVE BOX
Patient seen and examined at bedside. No acute complaints at this time. Pain well controlled. Denies weakness, numbness or tingling. Denies chest pain, shortness of breath, nausea or vomiting.     LABS:                VITAL SIGNS:  T(C): 36.3 (05-31-25 @ 06:00), Max: 36.6 (05-30-25 @ 13:55)  HR: 57 (05-31-25 @ 06:00) (55 - 76)  BP: 106/61 (05-31-25 @ 06:00) (103/44 - 124/75)  RR: 16 (05-31-25 @ 06:00) (12 - 16)  SpO2: 99% (05-31-25 @ 06:00) (95% - 100%)    General: NAD, resting comforatbly in bed    Compartments soft and compressible  Grossly moving all extremities  Dressing C/D/I  2+ radial pulses  2+ DP Pulses    Motor:                   C5                C6              C7               C8           T1   R             5/5                5/5            5/5              5/5          5/5  L             5/5                5/5            5/5              5/5          5/5                    L2                  L3             L4              L5            S1  R            5/5                5/5             5/5            5/5          5/5  L             5/5                5/5            5/5            5/5          5/5    Sensory:            C5         C6         C7      C8       T1        (0=absent, 1=impaired, 2=normal, NT=not testable)  R         2            2           2        2         2  L          2            2           2        2         2               L2          L3         L4      L5       S1         (0=absent, 1=impaired, 2=normal, NT=not testable)  R         2            2            2        2        2  L          2            2           2        2         2    Babinski Negative Bilaterally  Clonus Negative Bilaterally  Sharp Negative Bilaterally     A/P:  89y f POD 1 sp L4-5 Lami  -PT/OT: Pending PT Recs    -WBAT  -Pain Control  -HOLD DVT ppx   -Completed perioperative abx x 24 hours  -FU AM Labs  -Rest, ice as we needed  -Incentive Spirometry  -Medical management appreciated    Discussed with attending who is aware and agrees with plan. 
Patient seen and examined at bedside. No acute complaints at this time. Pain well controlled. Denies weakness, numbness or tingling. Denies chest pain, shortness of breath, nausea or vomiting.     LABS:                VITAL SIGNS:  T(C): 36.3 (05-31-25 @ 06:00), Max: 36.6 (05-30-25 @ 13:55)  HR: 57 (05-31-25 @ 06:00) (55 - 76)  BP: 106/61 (05-31-25 @ 06:00) (103/44 - 124/75)  RR: 16 (05-31-25 @ 06:00) (12 - 16)  SpO2: 99% (05-31-25 @ 06:00) (95% - 100%)    General: NAD, resting comforatbly in bed    Compartments soft and compressible  Grossly moving all extremities  Dressing saturated but intact   2+ radial pulses  2+ DP Pulses    Motor:                   C5                C6              C7               C8           T1   R             5/5                5/5            5/5              5/5          5/5  L             5/5                5/5            5/5              5/5          5/5                    L2                  L3             L4              L5            S1  R            5/5                5/5             5/5            5/5          5/5  L             5/5                5/5            5/5            5/5          5/5    Sensory:            C5         C6         C7      C8       T1        (0=absent, 1=impaired, 2=normal, NT=not testable)  R         2            2           2        2         2  L          2            2           2        2         2               L2          L3         L4      L5       S1         (0=absent, 1=impaired, 2=normal, NT=not testable)  R         2            2            2        2        2  L          2            2           2        2         2    Babinski Negative Bilaterally  Clonus Negative Bilaterally  Sharp Negative Bilaterally     A/P:  89y f POD 2 sp L4-5 Lami    -PT/OT: PT Rec JUNIOR   -WBAT  -Pain Control  -Dressings changed 6/1  -HOLD DVT ppx   -Completed perioperative abx x 24 hours  -FU AM Labs  -Rest, ice as we needed  -Incentive Spirometry  -Medical management appreciated    Discussed with attending who is aware and agrees with plan. 
Postop Check    Patient tolerated the procedure well. Patient seen and examined at bedside. No acute complaints at this time. Pain well controlled. Denies weakness, numbness or tingling. Denies chest pain, shortness of breath, nausea or vomiting.     PE:  Vital Signs Last 24 Hrs  T(C): 36.5 (05-30-25 @ 19:04), Max: 36.6 (05-30-25 @ 13:55)  T(F): 97.7 (05-30-25 @ 19:04), Max: 97.9 (05-30-25 @ 13:55)  HR: 62 (05-30-25 @ 19:04) (62 - 76)  BP: 103/52 (05-30-25 @ 19:04) (103/44 - 124/75)  BP(mean): 73 (05-30-25 @ 18:30) (63 - 74)  RR: 14 (05-30-25 @ 19:04) (12 - 14)  SpO2: 97% (05-30-25 @ 19:04) (95% - 98%)    General: NAD, resting comforatbly in bed    Compartments soft and compressible  Grossly moving all extremities  Dressing C/D/I  2+ radial pulses  2+ DP Pulses    Motor:                   C5                C6              C7               C8           T1   R             5/5                5/5            5/5              5/5          5/5  L             5/5                5/5            5/5              5/5          5/5                    L2                  L3             L4              L5            S1  R            5/5                5/5             5/5            5/5          5/5  L             5/5                5/5            5/5            5/5          5/5    Sensory:            C5         C6         C7      C8       T1        (0=absent, 1=impaired, 2=normal, NT=not testable)  R         2            2           2        2         2  L          2            2           2        2         2               L2          L3         L4      L5       S1         (0=absent, 1=impaired, 2=normal, NT=not testable)  R         2            2            2        2        2  L          2            2           2        2         2    Babinski Negative Bilaterally  Clonus Negative Bilaterally  Sharp Negative Bilaterally     A/P:  89y f POD 0 sp L4-5 Lami  -PT/OT: Pending PT Recs    -WBAT  -Pain Control  -HOLD DVT ppx   -Complete perioperative abx x 24 hours  -FU AM Labs  -Rest, ice as we needed  -Incentive Spirometry  -Medical management appreciated    Discussed with attending who is aware and agrees with plan. 
89yFemale s/p Lami L4-5 POD #3  Pt seen and examined in NAD. Admits to LLE sciatica pain.  Pain controlled. +Flatus -BM  Pt denies any new complaints.   Pt denies CP/SOB/N/V/D/numbness/tingling/bowel or bladder dysfunction.     Vital Signs Last 24 Hrs  T(C): 36.9 (02 Jun 2025 05:00), Max: 37.3 (01 Jun 2025 23:00)  T(F): 98.5 (02 Jun 2025 05:00), Max: 99.2 (01 Jun 2025 23:00)  HR: 75 (02 Jun 2025 09:38) (62 - 75)  BP: 103/60 (02 Jun 2025 09:38) (95/59 - 132/65)  BP(mean): --  RR: 17 (02 Jun 2025 05:00) (16 - 18)  SpO2: 96% (02 Jun 2025 09:38) (94% - 98%)    Parameters below as of 02 Jun 2025 09:38  Patient On (Oxygen Delivery Method): room air        PE:   Abd: soft, non-distended. Non-tender  General: A&Ox3, NAD  Spine: Dressing c/d/i    B/L UE: Skin intact. +ROM shoulder/elbow/wrist/fingers. +ok/thumbsup/fingercross signs.  strength: 5/5.  RP2+ NVI.   B/L LE: Skin intact. +ROM hip/knee/ankle/toes. Ankle Dorsi/plantarflexion: 5/5. Calf: soft, compressible and nontender. DP/PT 2+ NVI.                             9.8    9.03  )-----------( 168      ( 02 Jun 2025 05:50 )             31.6       06-02    139  |  108  |  38[H]  ----------------------------<  91  4.6   |  27  |  1.47[H]    Ca    8.9      02 Jun 2025 05:50          A/P: 89yFemale s/p Lami L4-5 POD #3  LLE Sciatica pain: continue to monitor.   Wound care  Pain controlled  PT: WBAT: Spinal precautions  Isometric exercises  DVT ppx: SCDs  Incentive spirometry counseled   Discharge: Rehab  All the above discussed and understood by pt   Patient requires rolling walker to perform MRADLs.  D/w Dr. Roldan

## 2025-06-02 NOTE — DISCHARGE NOTE NURSING/CASE MANAGEMENT/SOCIAL WORK - NSDCPEFALRISK_GEN_ALL_CORE
For information on Fall & Injury Prevention, visit: [email or web address] OR  [email or web address] OR  https://www.cdc.gov/steadi/patient.html

## 2025-06-02 NOTE — DISCHARGE NOTE NURSING/CASE MANAGEMENT/SOCIAL WORK - FINANCIAL ASSISTANCE
[facility] provides services at a reduced cost to those who are determined to be eligible through [facility]’s financial assistance program. Information regarding [facility]’s financial assistance program can be found by going to [email or web address] or by calling [phone].

## 2025-06-02 NOTE — DISCHARGE NOTE NURSING/CASE MANAGEMENT/SOCIAL WORK - NSDCFUADDAPPT_GEN_ALL_CORE_FT

## 2025-06-02 NOTE — DISCHARGE NOTE NURSING/CASE MANAGEMENT/SOCIAL WORK - PATIENT PORTAL LINK FT
You can access the FollowMyHealth Patient Portal offered by [facility] by registering at the following website: [email or web address]. By joining [employer]’s FollowMyHealth portal, you will also be able to view your health information using other applications (apps) compatible with our system.

## 2025-06-03 ENCOUNTER — APPOINTMENT (OUTPATIENT)
Dept: ORTHOPEDIC SURGERY | Facility: CLINIC | Age: 89
End: 2025-06-03

## 2025-06-04 ENCOUNTER — OUTPATIENT (OUTPATIENT)
Dept: OUTPATIENT SERVICES | Facility: HOSPITAL | Age: 89
LOS: 1 days | End: 2025-06-04
Payer: MEDICARE

## 2025-06-04 DIAGNOSIS — Z90.710 ACQUIRED ABSENCE OF BOTH CERVIX AND UTERUS: Chronic | ICD-10-CM

## 2025-06-04 DIAGNOSIS — Z98.891 HISTORY OF UTERINE SCAR FROM PREVIOUS SURGERY: Chronic | ICD-10-CM

## 2025-06-04 DIAGNOSIS — Z96.651 PRESENCE OF RIGHT ARTIFICIAL KNEE JOINT: Chronic | ICD-10-CM

## 2025-06-04 DIAGNOSIS — Z98.890 OTHER SPECIFIED POSTPROCEDURAL STATES: Chronic | ICD-10-CM

## 2025-06-04 DIAGNOSIS — I82.403 ACUTE EMBOLISM AND THROMBOSIS OF UNSPECIFIED DEEP VEINS OF LOWER EXTREMITY, BILATERAL: ICD-10-CM

## 2025-06-04 DIAGNOSIS — Z98.49 CATARACT EXTRACTION STATUS, UNSPECIFIED EYE: Chronic | ICD-10-CM

## 2025-06-04 DIAGNOSIS — Z87.19 PERSONAL HISTORY OF OTHER DISEASES OF THE DIGESTIVE SYSTEM: Chronic | ICD-10-CM

## 2025-06-05 ENCOUNTER — APPOINTMENT (OUTPATIENT)
Dept: PAIN MANAGEMENT | Facility: CLINIC | Age: 89
End: 2025-06-05

## 2025-06-05 PROCEDURE — 93970 EXTREMITY STUDY: CPT | Mod: 26

## 2025-06-06 DIAGNOSIS — M40.202 UNSPECIFIED KYPHOSIS, CERVICAL REGION: ICD-10-CM

## 2025-06-06 DIAGNOSIS — E78.5 HYPERLIPIDEMIA, UNSPECIFIED: ICD-10-CM

## 2025-06-06 DIAGNOSIS — M54.16 RADICULOPATHY, LUMBAR REGION: ICD-10-CM

## 2025-06-06 DIAGNOSIS — Z88.8 ALLERGY STATUS TO OTHER DRUGS, MEDICAMENTS AND BIOLOGICAL SUBSTANCES: ICD-10-CM

## 2025-06-06 DIAGNOSIS — Z88.5 ALLERGY STATUS TO NARCOTIC AGENT: ICD-10-CM

## 2025-06-06 DIAGNOSIS — I10 ESSENTIAL (PRIMARY) HYPERTENSION: ICD-10-CM

## 2025-06-06 DIAGNOSIS — G47.33 OBSTRUCTIVE SLEEP APNEA (ADULT) (PEDIATRIC): ICD-10-CM

## 2025-06-06 DIAGNOSIS — E23.0 HYPOPITUITARISM: ICD-10-CM

## 2025-06-06 DIAGNOSIS — M43.16 SPONDYLOLISTHESIS, LUMBAR REGION: ICD-10-CM

## 2025-06-06 DIAGNOSIS — Z79.52 LONG TERM (CURRENT) USE OF SYSTEMIC STEROIDS: ICD-10-CM

## 2025-06-06 DIAGNOSIS — M48.062 SPINAL STENOSIS, LUMBAR REGION WITH NEUROGENIC CLAUDICATION: ICD-10-CM

## 2025-06-06 DIAGNOSIS — M48.061 SPINAL STENOSIS, LUMBAR REGION WITHOUT NEUROGENIC CLAUDICATION: ICD-10-CM

## 2025-06-10 ENCOUNTER — NON-APPOINTMENT (OUTPATIENT)
Age: 89
End: 2025-06-10

## 2025-06-24 ENCOUNTER — APPOINTMENT (OUTPATIENT)
Dept: ORTHOPEDIC SURGERY | Facility: CLINIC | Age: 89
End: 2025-06-24
Payer: MEDICARE

## 2025-06-24 VITALS — BODY MASS INDEX: 33.42 KG/M2 | WEIGHT: 177 LBS | HEIGHT: 61 IN

## 2025-06-24 PROCEDURE — 99024 POSTOP FOLLOW-UP VISIT: CPT

## 2025-07-03 ENCOUNTER — APPOINTMENT (OUTPATIENT)
Dept: PAIN MANAGEMENT | Facility: CLINIC | Age: 89
End: 2025-07-03

## 2025-08-07 ENCOUNTER — APPOINTMENT (OUTPATIENT)
Dept: PAIN MANAGEMENT | Facility: CLINIC | Age: 89
End: 2025-08-07
Payer: MEDICARE

## 2025-08-07 VITALS — HEIGHT: 61 IN | WEIGHT: 177 LBS | BODY MASS INDEX: 33.42 KG/M2

## 2025-08-07 DIAGNOSIS — M54.2 CERVICALGIA: ICD-10-CM

## 2025-08-07 DIAGNOSIS — M54.17 RADICULOPATHY, LUMBOSACRAL REGION: ICD-10-CM

## 2025-08-07 DIAGNOSIS — M25.511 PAIN IN RIGHT SHOULDER: ICD-10-CM

## 2025-08-07 DIAGNOSIS — M47.816 SPONDYLOSIS W/OUT MYELOPATHY OR RADICULOPATHY, LUMBAR REGION: ICD-10-CM

## 2025-08-07 PROCEDURE — 99213 OFFICE O/P EST LOW 20 MIN: CPT

## 2025-08-15 ENCOUNTER — NON-APPOINTMENT (OUTPATIENT)
Age: 89
End: 2025-08-15

## 2025-08-25 ENCOUNTER — APPOINTMENT (OUTPATIENT)
Dept: ORTHOPEDIC SURGERY | Facility: CLINIC | Age: 89
End: 2025-08-25
Payer: MEDICARE

## 2025-08-25 VITALS — HEIGHT: 61 IN | WEIGHT: 177 LBS | BODY MASS INDEX: 33.42 KG/M2

## 2025-08-25 DIAGNOSIS — M25.511 PAIN IN RIGHT SHOULDER: ICD-10-CM

## 2025-08-25 DIAGNOSIS — Z98.890 OTHER SPECIFIED POSTPROCEDURAL STATES: ICD-10-CM

## 2025-08-25 PROCEDURE — 99024 POSTOP FOLLOW-UP VISIT: CPT

## 2025-09-11 ENCOUNTER — APPOINTMENT (OUTPATIENT)
Dept: ORTHOPEDIC SURGERY | Facility: CLINIC | Age: 89
End: 2025-09-11
Payer: MEDICARE

## 2025-09-11 DIAGNOSIS — M12.811 UNSPECIFIED ROTATOR CUFF TEAR OR RUPTURE OF RIGHT SHOULDER, NOT SPECIFIED AS TRAUMATIC: ICD-10-CM

## 2025-09-11 DIAGNOSIS — M75.101 UNSPECIFIED ROTATOR CUFF TEAR OR RUPTURE OF RIGHT SHOULDER, NOT SPECIFIED AS TRAUMATIC: ICD-10-CM

## 2025-09-11 DIAGNOSIS — M75.121 COMPLETE ROTATOR CUFF TEAR OR RUPTURE OF RIGHT SHOULDER, NOT SPECIFIED AS TRAUMATIC: ICD-10-CM

## 2025-09-11 PROCEDURE — 73030 X-RAY EXAM OF SHOULDER: CPT | Mod: RT

## 2025-09-11 PROCEDURE — 99214 OFFICE O/P EST MOD 30 MIN: CPT | Mod: 25

## 2025-09-11 PROCEDURE — 20610 DRAIN/INJ JOINT/BURSA W/O US: CPT | Mod: RT

## 2025-09-11 PROCEDURE — 73010 X-RAY EXAM OF SHOULDER BLADE: CPT | Mod: RT

## 2025-09-18 ENCOUNTER — APPOINTMENT (OUTPATIENT)
Dept: MRI IMAGING | Facility: CLINIC | Age: 89
End: 2025-09-18
Payer: MEDICARE

## 2025-09-18 PROCEDURE — 73221 MRI JOINT UPR EXTREM W/O DYE: CPT | Mod: RT

## (undated) DEVICE — PACK SPINE

## (undated) DEVICE — GLV 7.5 PROTEXIS (WHITE)

## (undated) DEVICE — BIPOLAR FORCEP HENSLER BAYONET 10" X 1MM (YELLOW)

## (undated) DEVICE — SUT VICRYL PLUS 1 18" CT-1 UNDYED (POP-OFF)

## (undated) DEVICE — DRAPE SURGICAL #1010

## (undated) DEVICE — DRSG KERLIX ROLL LG 4.5"

## (undated) DEVICE — SUT STRATAFIX SPIRAL MONOCRYL PLUS 4-0 45CM PS-2 UNDYED

## (undated) DEVICE — VENODYNE/SCD SLEEVE CALF MEDIUM

## (undated) DEVICE — DRSG TEGADERM 4 X 4.75"

## (undated) DEVICE — DRAPE TOWEL BLUE 17" X 24"

## (undated) DEVICE — SUT VICRYL PLUS 0 18" CT-1 UNDYED (POP-OFF)

## (undated) DEVICE — ELCTR STRYKER NEPTUNE SMOKE EVACUATION PENCIL (GREEN)

## (undated) DEVICE — DRAPE C ARM 41X140"

## (undated) DEVICE — FRA-ESU BOVIE FORCE TRIAD T7J19717DX: Type: DURABLE MEDICAL EQUIPMENT

## (undated) DEVICE — MISONIX BONESCALPEL BLUNT BLADE & TUBESET 20MM

## (undated) DEVICE — DRSG DERMABOND PRINEO 22CM

## (undated) DEVICE — NDL SPINAL 18G X 3.5" (PINK)

## (undated) DEVICE — SUT VICRYL PLUS 2-0 18" CP-2 UNDYED (POP-OFF)

## (undated) DEVICE — WARMING BLANKET UPPER ADULT

## (undated) DEVICE — SOL IRR POUR NS 0.9% 1000ML

## (undated) DEVICE — GLV 8 PROTEXIS (BLUE)

## (undated) DEVICE — DRSG XEROFORM 5 X 9"

## (undated) DEVICE — DRAPE SHOWER CURTAIN ISOLATION

## (undated) DEVICE — MIDAS REX LEGEND MATCH HEAD FLUTED LG BORE 3.0MM X 14CM

## (undated) DEVICE — ELCTR BOVIE TIP BLADE INSULATED 2.75" EDGE WITH SAFETY